# Patient Record
Sex: FEMALE | Race: WHITE | Employment: UNEMPLOYED | URBAN - METROPOLITAN AREA
[De-identification: names, ages, dates, MRNs, and addresses within clinical notes are randomized per-mention and may not be internally consistent; named-entity substitution may affect disease eponyms.]

---

## 2017-02-24 ENCOUNTER — OFFICE VISIT (OUTPATIENT)
Dept: ORTHOPEDIC SURGERY | Age: 57
End: 2017-02-24

## 2017-02-24 VITALS
HEIGHT: 62 IN | BODY MASS INDEX: 21.91 KG/M2 | WEIGHT: 119.05 LBS | SYSTOLIC BLOOD PRESSURE: 117 MMHG | HEART RATE: 65 BPM | DIASTOLIC BLOOD PRESSURE: 76 MMHG

## 2017-02-24 DIAGNOSIS — M79.644 PAIN OF FINGER OF RIGHT HAND: Primary | ICD-10-CM

## 2017-02-24 DIAGNOSIS — S63.619A FINGER SPRAIN, INITIAL ENCOUNTER: ICD-10-CM

## 2017-02-24 PROCEDURE — 73140 X-RAY EXAM OF FINGER(S): CPT | Performed by: ORTHOPAEDIC SURGERY

## 2017-02-24 PROCEDURE — 99214 OFFICE O/P EST MOD 30 MIN: CPT | Performed by: ORTHOPAEDIC SURGERY

## 2017-11-29 ENCOUNTER — OFFICE VISIT (OUTPATIENT)
Dept: ORTHOPEDIC SURGERY | Age: 57
End: 2017-11-29

## 2017-11-29 VITALS
BODY MASS INDEX: 21.91 KG/M2 | SYSTOLIC BLOOD PRESSURE: 129 MMHG | WEIGHT: 119.05 LBS | DIASTOLIC BLOOD PRESSURE: 87 MMHG | HEIGHT: 62 IN | HEART RATE: 95 BPM

## 2017-11-29 DIAGNOSIS — S92.355A CLOSED NONDISPLACED FRACTURE OF FIFTH METATARSAL BONE OF LEFT FOOT, INITIAL ENCOUNTER: ICD-10-CM

## 2017-11-29 DIAGNOSIS — M79.672 LEFT FOOT PAIN: Primary | ICD-10-CM

## 2017-11-29 PROCEDURE — 99214 OFFICE O/P EST MOD 30 MIN: CPT | Performed by: ORTHOPAEDIC SURGERY

## 2017-11-29 PROCEDURE — 73630 X-RAY EXAM OF FOOT: CPT | Performed by: ORTHOPAEDIC SURGERY

## 2017-11-29 PROCEDURE — L4361 PNEUMA/VAC WALK BOOT PRE OTS: HCPCS | Performed by: ORTHOPAEDIC SURGERY

## 2017-11-29 NOTE — PROGRESS NOTES
Chief Complaint    Injury (Left foot , fell and twisted it and rolled 11/28/2017)      History of Present Illness: Diogenes Hoskins is a 56 y.o. female whose  is Dr. Samuel Oppenheim here for evaluation chief complaint of left foot pain. States on 11/28/17 she was coming out of the basement and stepped onto a multi-out let extension cord. She rolled her foot into a inverted position and then fell to the ground. She had an isolated injury to the lateral aspect of the left foot. She's never injured this foot before. Pain is increased with weightbearing better when she gets off of it. She does not work out on a regular basis and currently rates her pain at 7-8 out of 10    Medical History:  Patient's medications, allergies, past medical, surgical, social and family histories were reviewed and updated as appropriate. Review of Systems:  Pertinent items are noted in HPI  Review of systems reviewed from Patient History Form dated on 11/29/17 and available in the patient's chart under the Media tab. Vital Signs:  /87 (Site: Left Arm, Position: Sitting)   Pulse 95   Ht 5' 1.81\" (1.57 m)   Wt 119 lb 0.8 oz (54 kg)   BMI 21.91 kg/m²     General Exam:   Constitutional: Patient is adequately groomed with no evidence of malnutrition  DTRs: Deep tendon reflexes are intact  Mental Status: The patient is oriented to time, place and person. The patient's mood and affect are appropriate. Foot Examination:    Inspection:  Mild swelling lateral aspect of left foot    Palpation:  Tenderness right over the 5th metatarsal base no tenderness over the ATFL    Range of Motion:  Mildly limited due to her complaint of pain lateral foot    Strength:  4/5 in a dorsiflexion eversion with only mild tenderness along the peroneal tendon    Special Tests:  Anterior drawer and talar tilt show no gross laxity    Skin: There are no rashes, ulcerations or lesions.     Gait: Antalgic with crutches    Reflex 2+ and

## 2017-12-20 ENCOUNTER — OFFICE VISIT (OUTPATIENT)
Dept: ORTHOPEDIC SURGERY | Age: 57
End: 2017-12-20

## 2017-12-20 VITALS
HEIGHT: 62 IN | HEART RATE: 88 BPM | WEIGHT: 119.05 LBS | DIASTOLIC BLOOD PRESSURE: 77 MMHG | BODY MASS INDEX: 21.91 KG/M2 | SYSTOLIC BLOOD PRESSURE: 119 MMHG

## 2017-12-20 DIAGNOSIS — M79.672 FOOT PAIN, LEFT: Primary | ICD-10-CM

## 2017-12-20 PROCEDURE — 73630 X-RAY EXAM OF FOOT: CPT | Performed by: ORTHOPAEDIC SURGERY

## 2017-12-20 PROCEDURE — 99212 OFFICE O/P EST SF 10 MIN: CPT | Performed by: ORTHOPAEDIC SURGERY

## 2017-12-20 NOTE — PROGRESS NOTES
Subjective: Patient states that she is here for follow-up of her left foot 5th metatarsal avulsion fracture. States that she has some achiness especially at the end of the day around a 4 out of 10. Also plantarflexion inversion tendons to create some increased pain. She has been very good about staying off of this and using her crutches. She did fall a few days ago which created a transient increase in her pain  Objective: Physical exam shows mild to moderate swelling directly over the base of the left 5th metatarsal.  She has 3+ to 4 minus over 5 strength into dorsiflexion eversion with tenderness right at the fracture site. She has no pain along the peroneal tendon. Anterior drawer and talar tilt showed no gross laxity. Sensations intact  Imaging: 3 views of the left foot show no change in the position of her fracture I don't see any callus formation yet  Assessment and plan: She will continue to take her multivitamin limit her activities only weight-bear on the heel she absolutely has to follow up with me in 3 weeks repeat x-rays

## 2018-01-10 ENCOUNTER — OFFICE VISIT (OUTPATIENT)
Dept: ORTHOPEDIC SURGERY | Age: 58
End: 2018-01-10

## 2018-01-10 VITALS
HEART RATE: 104 BPM | BODY MASS INDEX: 21.91 KG/M2 | SYSTOLIC BLOOD PRESSURE: 115 MMHG | WEIGHT: 119.05 LBS | HEIGHT: 62 IN | DIASTOLIC BLOOD PRESSURE: 75 MMHG

## 2018-01-10 DIAGNOSIS — S92.355D CLOSED NONDISPLACED FRACTURE OF FIFTH METATARSAL BONE OF LEFT FOOT WITH ROUTINE HEALING, SUBSEQUENT ENCOUNTER: ICD-10-CM

## 2018-01-10 DIAGNOSIS — M79.672 FOOT PAIN, LEFT: Primary | ICD-10-CM

## 2018-01-10 PROCEDURE — 99024 POSTOP FOLLOW-UP VISIT: CPT | Performed by: ORTHOPAEDIC SURGERY

## 2018-01-10 PROCEDURE — 73630 X-RAY EXAM OF FOOT: CPT | Performed by: ORTHOPAEDIC SURGERY

## 2018-02-14 ENCOUNTER — OFFICE VISIT (OUTPATIENT)
Dept: ORTHOPEDIC SURGERY | Age: 58
End: 2018-02-14

## 2018-02-14 VITALS
BODY MASS INDEX: 21.91 KG/M2 | HEIGHT: 62 IN | SYSTOLIC BLOOD PRESSURE: 121 MMHG | HEART RATE: 85 BPM | WEIGHT: 119.05 LBS | DIASTOLIC BLOOD PRESSURE: 77 MMHG

## 2018-02-14 DIAGNOSIS — M79.672 FOOT PAIN, LEFT: Primary | ICD-10-CM

## 2018-02-14 DIAGNOSIS — S92.355D CLOSED NONDISPLACED FRACTURE OF FIFTH METATARSAL BONE OF LEFT FOOT WITH ROUTINE HEALING, SUBSEQUENT ENCOUNTER: ICD-10-CM

## 2018-02-14 PROCEDURE — 99212 OFFICE O/P EST SF 10 MIN: CPT | Performed by: ORTHOPAEDIC SURGERY

## 2018-02-14 PROCEDURE — L1902 AFO ANKLE GAUNTLET PRE OTS: HCPCS | Performed by: ORTHOPAEDIC SURGERY

## 2018-02-21 ENCOUNTER — HOSPITAL ENCOUNTER (OUTPATIENT)
Dept: PHYSICAL THERAPY | Age: 58
Discharge: HOME | End: 2018-02-28
Admitting: ORTHOPAEDIC SURGERY

## 2018-02-21 NOTE — PLAN OF CARE
Disability Index:PT G-Codes  Functional Assessment Tool Used: LEFS   Score: 56%  Functional Limitation: Mobility: Walking and moving around  Mobility: Walking and Moving Around Current Status (): At least 40 percent but less than 60 percent impaired, limited or restricted  Mobility: Walking and Moving Around Goal Status (): At least 40 percent but less than 60 percent impaired, limited or restricted  Mobility: Walking and Moving Around Discharge Status ():  At least 40 percent but less than 60 percent impaired, limited or restricted    Pain Scale: 4-5/10  Easing factors: rest, ice   Provocative factors: rolling over in bed, turning foot out, pushing off foot going up the stairs      Type: [x]Constant   []Intermittent  []Radiating []Localized []other:     Numbness/Tingling: pt denies     Occupation/School: does not work     Living Status/Prior Level of Function: Independent with ADLs and IADLs, lives with , bedroom and bathroom upstairs; some other bedrooms upstairs ; pt was walking and hiking prior to injury     OBJECTIVE:     ROM LEFT RIGHT   HIP Flex     HIP Abd     HIP Ext     HIP IR     HIP ER     Knee ext     Knee Flex     Ankle PF 40° 60°   Ankle DF 5° 10°   Ankle In 22° pain 5th met° 35°   Ankle Ev 18° slight pain  25°   Strength  LEFT RIGHT   HIP Flexors     HIP Abductors     HIP Ext     Hip ER     Knee EXT (quad)     Knee Flex (HS)     Ankle DF 4+ 5   Ankle PF 4 5   Ankle Inv 4 5   Ankle EV 4+ 5        Circumference  Mid apex  7 cm prox             Reflexes/Sensation: NT   []Dermatomes/Myotomes intact    []Reflexes equal and normal bilaterally   []Other:    Joint mobility:    []Normal    [x]Hypo   []Hyper    Palpation: tender with palp L peroneal tendon; 5ht metatarsal     Functional Mobility/Transfers: independent     Posture: decreased weight acceptance through LLE     Bandages/Dressings/Incisions: NA     Gait: WBAT; no AD ; decreased DF at heelstrike, no PF at pushoff, decreased step length, decreased stance time on the L     Orthopedic Special Tests: negative ant drawer                        [x] Patient history, allergies, meds reviewed. Medical chart reviewed. See intake form. Review Of Systems (ROS):  [x]Performed Review of systems (Integumentary, CardioPulmonary, Neurological) by intake and observation. Intake form has been scanned into medical record. Patient has been instructed to contact their primary care physician regarding ROS issues if not already being addressed at this time. Co-morbidities/Complexities (which will affect course of rehabilitation):   [x]None           Arthritic conditions   []Rheumatoid arthritis (M05.9)  []Osteoarthritis (M19.91)   Cardiovascular conditions   []Hypertension (I10)  []Hyperlipidemia (E78.5)  []Angina pectoris (I20)  []Atherosclerosis (I70)   Musculoskeletal conditions   []Disc pathology   []Congenital spine pathologies   []Prior surgical intervention  []Osteoporosis (M81.8)  []Osteopenia (M85.8)   Endocrine conditions   []Hypothyroid (E03.9)  []Hyperthyroid Gastrointestinal conditions   []Constipation (M60.03)   Metabolic conditions   []Morbid obesity (E66.01)  []Diabetes type 1(E10.65) or 2 (E11.65)   []Neuropathy (G60.9)     Pulmonary conditions   []Asthma (J45)  []Coughing   []COPD (J44.9)   Psychological Disorders  []Anxiety (F41.9)  []Depression (F32.9)   []Other:   []Other:          Barriers to/and or personal factors that will affect rehab potential:              []Age  []Sex              []Motivation/Lack of Motivation                        []Co-Morbidities              []Cognitive Function, education/learning barriers              []Environmental, home barriers              []profession/work barriers  []past PT/medical experience  []other:  Justification:     Falls Risk Assessment (30 days):   [x] Falls Risk assessed and no intervention required.   [] Falls Risk assessed and Patient requires intervention due to being higher risk 30 minutes face-to-face)  [] EVAL (HIGH) 44054 (typically 45 minutes face-to-face)  [] RE-EVAL       PLAN:   Frequency/Duration:  1 days per week for 1 Weeks:  Interventions:  [x]  Therapeutic exercise including: strength training, ROM, for Lower extremity and core   [x]  NMR activation and proprioception for LE, Glutes and Core   [x]  Manual therapy as indicated for LE, Hip and spine to include: Dry Needling/IASTM, STM, PROM, Gr I-IV mobilizations, manipulation. [] Modalities as needed that may include: thermal agents, E-stim, Biofeedback, US, iontophoresis as indicated  [x] Patient education on joint protection, postural re-education, activity modification, progression of HEP.     HEP instruction: pt given written HEP and progression   GOALS:  Patient stated goal: increase ankle/foot mobility, decreased pain     Therapist goals for Patient: NA ; PT order for 1x visit for HEP        Electronically signed by:  John Hernandez, PT

## 2018-03-01 ENCOUNTER — HOSPITAL ENCOUNTER (OUTPATIENT)
Dept: PHYSICAL THERAPY | Age: 58
Discharge: HOME | End: 2018-03-31
Attending: ORTHOPAEDIC SURGERY | Admitting: ORTHOPAEDIC SURGERY

## 2018-03-28 ENCOUNTER — OFFICE VISIT (OUTPATIENT)
Dept: ORTHOPEDIC SURGERY | Age: 58
End: 2018-03-28

## 2018-03-28 VITALS
HEART RATE: 98 BPM | DIASTOLIC BLOOD PRESSURE: 71 MMHG | HEIGHT: 62 IN | WEIGHT: 119.05 LBS | BODY MASS INDEX: 21.91 KG/M2 | SYSTOLIC BLOOD PRESSURE: 118 MMHG

## 2018-03-28 DIAGNOSIS — S92.355D OPEN NONDISPLACED FRACTURE OF FIFTH METATARSAL BONE OF LEFT FOOT WITH ROUTINE HEALING, SUBSEQUENT ENCOUNTER: ICD-10-CM

## 2018-03-28 DIAGNOSIS — M79.672 FOOT PAIN, LEFT: Primary | ICD-10-CM

## 2018-03-28 PROCEDURE — 99212 OFFICE O/P EST SF 10 MIN: CPT | Performed by: ORTHOPAEDIC SURGERY

## 2018-03-28 RX ORDER — DEXAMETHASONE SODIUM PHOSPHATE 4 MG/ML
4 INJECTION, SOLUTION INTRA-ARTICULAR; INTRALESIONAL; INTRAMUSCULAR; INTRAVENOUS; SOFT TISSUE SEE ADMIN INSTRUCTIONS
Qty: 30 ML | Refills: 0 | Status: SHIPPED | OUTPATIENT
Start: 2018-03-28

## 2018-03-28 RX ORDER — MELOXICAM 15 MG/1
15 TABLET ORAL DAILY
Qty: 30 TABLET | Refills: 3 | Status: SHIPPED | OUTPATIENT
Start: 2018-03-28

## 2018-03-28 RX ORDER — CYCLOSPORINE 0.5 MG/ML
EMULSION OPHTHALMIC
Refills: 2 | COMMUNITY
Start: 2018-01-15

## 2018-04-03 ENCOUNTER — HOSPITAL ENCOUNTER (OUTPATIENT)
Dept: PHYSICAL THERAPY | Age: 58
Discharge: HOME | End: 2018-04-30
Admitting: ORTHOPAEDIC SURGERY

## 2018-04-06 ENCOUNTER — HOSPITAL ENCOUNTER (OUTPATIENT)
Dept: PHYSICAL THERAPY | Age: 58
Discharge: HOME | End: 2018-04-07
Admitting: ORTHOPAEDIC SURGERY

## 2018-04-11 ENCOUNTER — HOSPITAL ENCOUNTER (OUTPATIENT)
Dept: PHYSICAL THERAPY | Age: 58
Discharge: HOME | End: 2018-04-12
Admitting: ORTHOPAEDIC SURGERY

## 2018-04-13 ENCOUNTER — HOSPITAL ENCOUNTER (OUTPATIENT)
Dept: PHYSICAL THERAPY | Age: 58
Discharge: HOME | End: 2018-04-14
Admitting: ORTHOPAEDIC SURGERY

## 2018-04-17 ENCOUNTER — HOSPITAL ENCOUNTER (OUTPATIENT)
Dept: PHYSICAL THERAPY | Age: 58
Discharge: HOME | End: 2018-04-18
Admitting: ORTHOPAEDIC SURGERY

## 2018-04-19 ENCOUNTER — HOSPITAL ENCOUNTER (OUTPATIENT)
Dept: PHYSICAL THERAPY | Age: 58
Discharge: HOME | End: 2018-04-20
Admitting: ORTHOPAEDIC SURGERY

## 2018-04-25 ENCOUNTER — HOSPITAL ENCOUNTER (OUTPATIENT)
Dept: PHYSICAL THERAPY | Age: 58
Discharge: HOME | End: 2018-04-26
Admitting: ORTHOPAEDIC SURGERY

## 2018-05-01 ENCOUNTER — HOSPITAL ENCOUNTER (OUTPATIENT)
Dept: PHYSICAL THERAPY | Age: 58
Discharge: HOME | End: 2018-05-31
Attending: ORTHOPAEDIC SURGERY | Admitting: ORTHOPAEDIC SURGERY

## 2018-05-03 ENCOUNTER — HOSPITAL ENCOUNTER (OUTPATIENT)
Dept: PHYSICAL THERAPY | Age: 58
Discharge: HOME | End: 2018-05-04
Admitting: ORTHOPAEDIC SURGERY

## 2018-05-04 DIAGNOSIS — S92.355D CLOSED NONDISPLACED FRACTURE OF FIFTH METATARSAL BONE OF LEFT FOOT WITH ROUTINE HEALING, SUBSEQUENT ENCOUNTER: Primary | ICD-10-CM

## 2018-05-10 ENCOUNTER — HOSPITAL ENCOUNTER (OUTPATIENT)
Dept: PHYSICAL THERAPY | Age: 58
Discharge: HOME | End: 2018-05-11
Admitting: ORTHOPAEDIC SURGERY

## 2018-06-01 ENCOUNTER — HOSPITAL ENCOUNTER (OUTPATIENT)
Dept: PHYSICAL THERAPY | Age: 58
Discharge: HOME | End: 2018-06-30
Attending: ORTHOPAEDIC SURGERY | Admitting: ORTHOPAEDIC SURGERY

## 2019-04-18 ENCOUNTER — OFFICE VISIT (OUTPATIENT)
Dept: ORTHOPEDIC SURGERY | Age: 59
End: 2019-04-18
Payer: COMMERCIAL

## 2019-04-18 ENCOUNTER — PRE-EVALUATION (OUTPATIENT)
Dept: ORTHOPEDIC SURGERY | Age: 59
End: 2019-04-18

## 2019-04-18 VITALS
DIASTOLIC BLOOD PRESSURE: 82 MMHG | HEART RATE: 88 BPM | BODY MASS INDEX: 21.91 KG/M2 | SYSTOLIC BLOOD PRESSURE: 123 MMHG | WEIGHT: 119.05 LBS | HEIGHT: 62 IN

## 2019-04-18 DIAGNOSIS — M47.816 LUMBAR SPONDYLOSIS: Primary | ICD-10-CM

## 2019-04-18 DIAGNOSIS — M54.50 LUMBAR PAIN: ICD-10-CM

## 2019-04-18 PROCEDURE — G8427 DOCREV CUR MEDS BY ELIG CLIN: HCPCS | Performed by: ORTHOPAEDIC SURGERY

## 2019-04-18 PROCEDURE — MISCD282 ADJUSTA LIFT: Performed by: ORTHOPAEDIC SURGERY

## 2019-04-18 PROCEDURE — 3017F COLORECTAL CA SCREEN DOC REV: CPT | Performed by: ORTHOPAEDIC SURGERY

## 2019-04-18 PROCEDURE — 99213 OFFICE O/P EST LOW 20 MIN: CPT | Performed by: ORTHOPAEDIC SURGERY

## 2019-04-18 PROCEDURE — 1036F TOBACCO NON-USER: CPT | Performed by: ORTHOPAEDIC SURGERY

## 2019-04-18 PROCEDURE — G8420 CALC BMI NORM PARAMETERS: HCPCS | Performed by: ORTHOPAEDIC SURGERY

## 2019-04-18 PROCEDURE — APPNB30 APP NON BILLABLE TIME 0-30 MINS: Performed by: PHYSICIAN ASSISTANT

## 2019-04-18 NOTE — PROGRESS NOTES
flexion, extension and lateral bending are moderately reduced with pain. She has mild tenderness over her lumbar spine without obvious muscle spasm. The skin over her lumbar spine is normal without a surgical scar. Lower extremities:  She has 5/5 motor strength of bilateral lower extremities. She has a negative straight leg raise, bilaterally. Deep tendon reflexes at knees and achilles are 2+. Sensation is intact to light touch L3 to S1 bilaterally. She has no clonus. Hip range of motion painless. Imaging:  I independently reviewed AP and lateral xray images of her lumbar spine from the office today. They note mild scoliosis and spondylosis. No acute fracture or dislocation noted. Assessment:  Lumbar spondylosis with radiculopathy    Plan:  We discussed treatment options including observation, oral steroids, physical therapy, epidural injections and additional imaging. I recommend she have an MRI of her lumbar spine. We will call her with her results.

## 2019-04-19 ENCOUNTER — TELEPHONE (OUTPATIENT)
Dept: ORTHOPEDIC SURGERY | Age: 59
End: 2019-04-19

## 2019-05-06 ENCOUNTER — TELEPHONE (OUTPATIENT)
Dept: ORTHOPEDIC SURGERY | Age: 59
End: 2019-05-06

## 2019-05-09 ENCOUNTER — TELEPHONE (OUTPATIENT)
Dept: ORTHOPEDIC SURGERY | Age: 59
End: 2019-05-09

## 2023-10-19 ENCOUNTER — HOSPITAL ENCOUNTER (OUTPATIENT)
Dept: MRI IMAGING | Age: 63
Discharge: HOME | End: 2023-10-19
Attending: SURGERY
Payer: COMMERCIAL

## 2023-10-19 ENCOUNTER — TELEPHONE (OUTPATIENT)
Dept: SURGERY | Age: 63
End: 2023-10-19

## 2023-10-19 ENCOUNTER — HOSPITAL ENCOUNTER (OUTPATIENT)
Age: 63
Discharge: HOME | End: 2023-10-19
Attending: SURGERY
Payer: COMMERCIAL

## 2023-10-19 DIAGNOSIS — R10.84 GENERALIZED ABDOMINAL PAIN: ICD-10-CM

## 2023-10-19 DIAGNOSIS — R17 JAUNDICE: ICD-10-CM

## 2023-10-19 DIAGNOSIS — R10.84 GENERALIZED ABDOMINAL PAIN: Primary | ICD-10-CM

## 2023-10-19 LAB
ALBUMIN SERPL-MCNC: 4.5 G/DL (ref 3.4–5)
ALP SERPL-CCNC: 500 U/L (ref 40–129)
ALT SERPL-CCNC: 583 U/L (ref 10–40)
ANION GAP SERPL CALCULATED.3IONS-SCNC: 11 MMOL/L (ref 3–16)
AST SERPL-CCNC: 335 U/L (ref 15–37)
BASOPHILS # BLD: 0.1 K/UL (ref 0–0.2)
BASOPHILS NFR BLD: 2.3 %
BILIRUB DIRECT SERPL-MCNC: 4.1 MG/DL (ref 0–0.3)
BILIRUB INDIRECT SERPL-MCNC: 1.3 MG/DL (ref 0–1)
BILIRUB SERPL-MCNC: 5.4 MG/DL (ref 0–1)
BUN SERPL-MCNC: 9 MG/DL (ref 7–20)
CALCIUM SERPL-MCNC: 9.7 MG/DL (ref 8.3–10.6)
CHLORIDE SERPL-SCNC: 104 MMOL/L (ref 99–110)
CO2 SERPL-SCNC: 24 MMOL/L (ref 21–32)
CREAT SERPL-MCNC: <0.5 MG/DL (ref 0.6–1.2)
DEPRECATED RDW RBC AUTO: 13.7 % (ref 12.4–15.4)
EOSINOPHIL # BLD: 0.2 K/UL (ref 0–0.6)
EOSINOPHIL NFR BLD: 3.8 %
GFR SERPLBLD CREATININE-BSD FMLA CKD-EPI: >60 ML/MIN/{1.73_M2}
GLUCOSE SERPL-MCNC: 104 MG/DL (ref 70–99)
HCT VFR BLD AUTO: 39.7 % (ref 36–48)
HGB BLD-MCNC: 13.3 G/DL (ref 12–16)
LYMPHOCYTES # BLD: 1.5 K/UL (ref 1–5.1)
LYMPHOCYTES NFR BLD: 36.8 %
MCH RBC QN AUTO: 29.4 PG (ref 26–34)
MCHC RBC AUTO-ENTMCNC: 33.6 G/DL (ref 31–36)
MCV RBC AUTO: 87.5 FL (ref 80–100)
MONOCYTES # BLD: 0.5 K/UL (ref 0–1.3)
MONOCYTES NFR BLD: 12.1 %
NEUTROPHILS # BLD: 1.9 K/UL (ref 1.7–7.7)
NEUTROPHILS NFR BLD: 45 %
PLATELET # BLD AUTO: 225 K/UL (ref 135–450)
PMV BLD AUTO: 8.1 FL (ref 5–10.5)
POTASSIUM SERPL-SCNC: 4 MMOL/L (ref 3.5–5.1)
PROT SERPL-MCNC: 7.5 G/DL (ref 6.4–8.2)
RBC # BLD AUTO: 4.53 M/UL (ref 4–5.2)
SODIUM SERPL-SCNC: 139 MMOL/L (ref 136–145)
WBC # BLD AUTO: 4.2 K/UL (ref 4–11)

## 2023-10-19 PROCEDURE — 74183 MRI ABD W/O CNTR FLWD CNTR: CPT

## 2023-10-19 PROCEDURE — 80076 HEPATIC FUNCTION PANEL: CPT

## 2023-10-19 PROCEDURE — 6360000004 HC RX CONTRAST MEDICATION: Performed by: SURGERY

## 2023-10-19 PROCEDURE — A9579 GAD-BASE MR CONTRAST NOS,1ML: HCPCS | Performed by: SURGERY

## 2023-10-19 PROCEDURE — 36415 COLL VENOUS BLD VENIPUNCTURE: CPT

## 2023-10-19 PROCEDURE — 85025 COMPLETE CBC W/AUTO DIFF WBC: CPT

## 2023-10-19 PROCEDURE — 80048 BASIC METABOLIC PNL TOTAL CA: CPT

## 2023-10-19 RX ADMIN — GADOTERIDOL 10 ML: 279.3 INJECTION, SOLUTION INTRAVENOUS at 13:50

## 2023-10-19 NOTE — TELEPHONE ENCOUNTER
Patient called with nausea, vomiting, and abdominal pain for about a week. She has begun to have shaji colored stools and her skin is jaundiced. Given these findings, we will check some labs and an MRCP.

## 2023-10-20 ENCOUNTER — ANESTHESIA (OUTPATIENT)
Dept: ENDOSCOPY | Age: 63
End: 2023-10-20
Payer: COMMERCIAL

## 2023-10-20 ENCOUNTER — HOSPITAL ENCOUNTER (OUTPATIENT)
Age: 63
Setting detail: OUTPATIENT SURGERY
Discharge: HOME | End: 2023-10-20
Attending: INTERNAL MEDICINE | Admitting: INTERNAL MEDICINE
Payer: COMMERCIAL

## 2023-10-20 ENCOUNTER — ANESTHESIA EVENT (OUTPATIENT)
Dept: ENDOSCOPY | Age: 63
End: 2023-10-20
Payer: COMMERCIAL

## 2023-10-20 ENCOUNTER — APPOINTMENT (OUTPATIENT)
Dept: GENERAL RADIOLOGY | Age: 63
End: 2023-10-20
Attending: INTERNAL MEDICINE
Payer: COMMERCIAL

## 2023-10-20 VITALS
OXYGEN SATURATION: 100 % | HEIGHT: 62 IN | HEART RATE: 69 BPM | TEMPERATURE: 97.3 F | BODY MASS INDEX: 21.71 KG/M2 | RESPIRATION RATE: 11 BRPM | DIASTOLIC BLOOD PRESSURE: 76 MMHG | WEIGHT: 118 LBS | SYSTOLIC BLOOD PRESSURE: 145 MMHG

## 2023-10-20 DIAGNOSIS — K80.50 STONES COMMON DUCT: ICD-10-CM

## 2023-10-20 DIAGNOSIS — R17 JAUNDICE: ICD-10-CM

## 2023-10-20 PROCEDURE — 7100000011 HC PHASE II RECOVERY - ADDTL 15 MIN: Performed by: INTERNAL MEDICINE

## 2023-10-20 PROCEDURE — 88342 IMHCHEM/IMCYTCHM 1ST ANTB: CPT

## 2023-10-20 PROCEDURE — 2580000003 HC RX 258: Performed by: NURSE ANESTHETIST, CERTIFIED REGISTERED

## 2023-10-20 PROCEDURE — 3700000000 HC ANESTHESIA ATTENDED CARE: Performed by: INTERNAL MEDICINE

## 2023-10-20 PROCEDURE — 88305 TISSUE EXAM BY PATHOLOGIST: CPT

## 2023-10-20 PROCEDURE — 3609015100 HC ERCP STENT PLACEMENT BILIARY/PANCREATIC DUCT: Performed by: INTERNAL MEDICINE

## 2023-10-20 PROCEDURE — 88172 CYTP DX EVAL FNA 1ST EA SITE: CPT

## 2023-10-20 PROCEDURE — 3609012400 HC EGD TRANSORAL BIOPSY SINGLE/MULTIPLE: Performed by: INTERNAL MEDICINE

## 2023-10-20 PROCEDURE — 6360000002 HC RX W HCPCS: Performed by: NURSE ANESTHETIST, CERTIFIED REGISTERED

## 2023-10-20 PROCEDURE — 3700000001 HC ADD 15 MINUTES (ANESTHESIA): Performed by: INTERNAL MEDICINE

## 2023-10-20 PROCEDURE — 2500000003 HC RX 250 WO HCPCS: Performed by: ANESTHESIOLOGY

## 2023-10-20 PROCEDURE — 74330 X-RAY BILE/PANC ENDOSCOPY: CPT

## 2023-10-20 PROCEDURE — C2617 STENT, NON-COR, TEM W/O DEL: HCPCS | Performed by: INTERNAL MEDICINE

## 2023-10-20 PROCEDURE — 2720000010 HC SURG SUPPLY STERILE: Performed by: INTERNAL MEDICINE

## 2023-10-20 PROCEDURE — 7100000001 HC PACU RECOVERY - ADDTL 15 MIN: Performed by: INTERNAL MEDICINE

## 2023-10-20 PROCEDURE — 7100000010 HC PHASE II RECOVERY - FIRST 15 MIN: Performed by: INTERNAL MEDICINE

## 2023-10-20 PROCEDURE — 88173 CYTOPATH EVAL FNA REPORT: CPT

## 2023-10-20 PROCEDURE — 2709999900 HC NON-CHARGEABLE SUPPLY: Performed by: INTERNAL MEDICINE

## 2023-10-20 PROCEDURE — 6370000000 HC RX 637 (ALT 250 FOR IP): Performed by: INTERNAL MEDICINE

## 2023-10-20 PROCEDURE — 6360000002 HC RX W HCPCS: Performed by: ANESTHESIOLOGY

## 2023-10-20 PROCEDURE — 2500000003 HC RX 250 WO HCPCS: Performed by: NURSE ANESTHETIST, CERTIFIED REGISTERED

## 2023-10-20 PROCEDURE — 88341 IMHCHEM/IMCYTCHM EA ADD ANTB: CPT

## 2023-10-20 PROCEDURE — 7100000000 HC PACU RECOVERY - FIRST 15 MIN: Performed by: INTERNAL MEDICINE

## 2023-10-20 PROCEDURE — 3609020800 HC EGD W/EUS FNA: Performed by: INTERNAL MEDICINE

## 2023-10-20 PROCEDURE — 88177 CYTP FNA EVAL EA ADDL: CPT

## 2023-10-20 DEVICE — PANCREATIC STENT
Type: IMPLANTABLE DEVICE | Status: FUNCTIONAL
Brand: ADVANIX™ PANCREATIC STENT

## 2023-10-20 RX ORDER — MIDAZOLAM HYDROCHLORIDE 1 MG/ML
2 INJECTION INTRAMUSCULAR; INTRAVENOUS ONCE
Status: COMPLETED | OUTPATIENT
Start: 2023-10-20 | End: 2023-10-20

## 2023-10-20 RX ORDER — OXYCODONE HYDROCHLORIDE 5 MG/1
10 TABLET ORAL PRN
Status: DISCONTINUED | OUTPATIENT
Start: 2023-10-20 | End: 2023-10-20 | Stop reason: HOSPADM

## 2023-10-20 RX ORDER — OXYCODONE HYDROCHLORIDE 5 MG/1
5 TABLET ORAL PRN
Status: DISCONTINUED | OUTPATIENT
Start: 2023-10-20 | End: 2023-10-20 | Stop reason: HOSPADM

## 2023-10-20 RX ORDER — ESOMEPRAZOLE MAGNESIUM 20 MG/1
20 FOR SUSPENSION ORAL DAILY
COMMUNITY

## 2023-10-20 RX ORDER — FENTANYL CITRATE 50 UG/ML
INJECTION, SOLUTION INTRAMUSCULAR; INTRAVENOUS PRN
Status: DISCONTINUED | OUTPATIENT
Start: 2023-10-20 | End: 2023-10-20 | Stop reason: SDUPTHER

## 2023-10-20 RX ORDER — MEPERIDINE HYDROCHLORIDE 25 MG/ML
12.5 INJECTION INTRAMUSCULAR; INTRAVENOUS; SUBCUTANEOUS EVERY 5 MIN PRN
Status: DISCONTINUED | OUTPATIENT
Start: 2023-10-20 | End: 2023-10-20 | Stop reason: HOSPADM

## 2023-10-20 RX ORDER — SODIUM CHLORIDE 0.9 % (FLUSH) 0.9 %
5-40 SYRINGE (ML) INJECTION EVERY 12 HOURS SCHEDULED
Status: DISCONTINUED | OUTPATIENT
Start: 2023-10-20 | End: 2023-10-20 | Stop reason: HOSPADM

## 2023-10-20 RX ORDER — LIDOCAINE HYDROCHLORIDE 10 MG/ML
0.3 INJECTION, SOLUTION EPIDURAL; INFILTRATION; INTRACAUDAL; PERINEURAL
Status: DISCONTINUED | OUTPATIENT
Start: 2023-10-20 | End: 2023-10-20 | Stop reason: HOSPADM

## 2023-10-20 RX ORDER — ONDANSETRON 2 MG/ML
4 INJECTION INTRAMUSCULAR; INTRAVENOUS
Status: DISCONTINUED | OUTPATIENT
Start: 2023-10-20 | End: 2023-10-20 | Stop reason: HOSPADM

## 2023-10-20 RX ORDER — SODIUM CHLORIDE, SODIUM LACTATE, POTASSIUM CHLORIDE, CALCIUM CHLORIDE 600; 310; 30; 20 MG/100ML; MG/100ML; MG/100ML; MG/100ML
INJECTION, SOLUTION INTRAVENOUS CONTINUOUS
Status: DISCONTINUED | OUTPATIENT
Start: 2023-10-20 | End: 2023-10-20 | Stop reason: HOSPADM

## 2023-10-20 RX ORDER — KETOROLAC TROMETHAMINE 30 MG/ML
INJECTION, SOLUTION INTRAMUSCULAR; INTRAVENOUS PRN
Status: DISCONTINUED | OUTPATIENT
Start: 2023-10-20 | End: 2023-10-20 | Stop reason: SDUPTHER

## 2023-10-20 RX ORDER — DEXAMETHASONE SODIUM PHOSPHATE 4 MG/ML
INJECTION, SOLUTION INTRA-ARTICULAR; INTRALESIONAL; INTRAMUSCULAR; INTRAVENOUS; SOFT TISSUE PRN
Status: DISCONTINUED | OUTPATIENT
Start: 2023-10-20 | End: 2023-10-20 | Stop reason: SDUPTHER

## 2023-10-20 RX ORDER — ROCURONIUM BROMIDE 10 MG/ML
INJECTION, SOLUTION INTRAVENOUS PRN
Status: DISCONTINUED | OUTPATIENT
Start: 2023-10-20 | End: 2023-10-20 | Stop reason: SDUPTHER

## 2023-10-20 RX ORDER — LIDOCAINE HYDROCHLORIDE 20 MG/ML
INJECTION, SOLUTION EPIDURAL; INFILTRATION; INTRACAUDAL; PERINEURAL PRN
Status: DISCONTINUED | OUTPATIENT
Start: 2023-10-20 | End: 2023-10-20 | Stop reason: SDUPTHER

## 2023-10-20 RX ORDER — LABETALOL HYDROCHLORIDE 5 MG/ML
5 INJECTION, SOLUTION INTRAVENOUS EVERY 10 MIN PRN
Status: DISCONTINUED | OUTPATIENT
Start: 2023-10-20 | End: 2023-10-20 | Stop reason: HOSPADM

## 2023-10-20 RX ORDER — PROPOFOL 10 MG/ML
INJECTION, EMULSION INTRAVENOUS PRN
Status: DISCONTINUED | OUTPATIENT
Start: 2023-10-20 | End: 2023-10-20 | Stop reason: SDUPTHER

## 2023-10-20 RX ORDER — INDOMETHACIN 50 MG/1
100 SUPPOSITORY RECTAL ONCE
Status: COMPLETED | OUTPATIENT
Start: 2023-10-20 | End: 2023-10-20

## 2023-10-20 RX ORDER — SODIUM CHLORIDE 0.9 % (FLUSH) 0.9 %
5-40 SYRINGE (ML) INJECTION PRN
Status: DISCONTINUED | OUTPATIENT
Start: 2023-10-20 | End: 2023-10-20 | Stop reason: HOSPADM

## 2023-10-20 RX ORDER — SODIUM CHLORIDE, SODIUM LACTATE, POTASSIUM CHLORIDE, CALCIUM CHLORIDE 600; 310; 30; 20 MG/100ML; MG/100ML; MG/100ML; MG/100ML
INJECTION, SOLUTION INTRAVENOUS CONTINUOUS PRN
Status: DISCONTINUED | OUTPATIENT
Start: 2023-10-20 | End: 2023-10-20 | Stop reason: SDUPTHER

## 2023-10-20 RX ORDER — SODIUM CHLORIDE 9 MG/ML
INJECTION, SOLUTION INTRAVENOUS PRN
Status: DISCONTINUED | OUTPATIENT
Start: 2023-10-20 | End: 2023-10-20 | Stop reason: HOSPADM

## 2023-10-20 RX ORDER — ONDANSETRON 2 MG/ML
INJECTION INTRAMUSCULAR; INTRAVENOUS PRN
Status: DISCONTINUED | OUTPATIENT
Start: 2023-10-20 | End: 2023-10-20 | Stop reason: SDUPTHER

## 2023-10-20 RX ORDER — DIPHENHYDRAMINE HYDROCHLORIDE 50 MG/ML
12.5 INJECTION INTRAMUSCULAR; INTRAVENOUS
Status: DISCONTINUED | OUTPATIENT
Start: 2023-10-20 | End: 2023-10-20 | Stop reason: HOSPADM

## 2023-10-20 RX ADMIN — DEXAMETHASONE SODIUM PHOSPHATE 8 MG: 4 INJECTION, SOLUTION INTRAMUSCULAR; INTRAVENOUS at 15:21

## 2023-10-20 RX ADMIN — LIDOCAINE HYDROCHLORIDE 60 MG: 20 INJECTION, SOLUTION EPIDURAL; INFILTRATION; INTRACAUDAL; PERINEURAL at 15:10

## 2023-10-20 RX ADMIN — PHENYLEPHRINE HYDROCHLORIDE 100 MCG: 10 INJECTION INTRAVENOUS at 16:01

## 2023-10-20 RX ADMIN — MIDAZOLAM 2 MG: 1 INJECTION INTRAMUSCULAR; INTRAVENOUS at 15:01

## 2023-10-20 RX ADMIN — ROCURONIUM BROMIDE 50 MG: 10 INJECTION, SOLUTION INTRAVENOUS at 15:10

## 2023-10-20 RX ADMIN — KETOROLAC TROMETHAMINE 30 MG: 30 INJECTION, SOLUTION INTRAMUSCULAR at 17:07

## 2023-10-20 RX ADMIN — PHENYLEPHRINE HYDROCHLORIDE 100 MCG: 10 INJECTION INTRAVENOUS at 16:23

## 2023-10-20 RX ADMIN — SUGAMMADEX 200 MG: 100 INJECTION, SOLUTION INTRAVENOUS at 17:02

## 2023-10-20 RX ADMIN — INDOMETHACIN 100 MG: 50 SUPPOSITORY RECTAL at 17:30

## 2023-10-20 RX ADMIN — ONDANSETRON HYDROCHLORIDE 4 MG: 2 INJECTION, SOLUTION INTRAMUSCULAR; INTRAVENOUS at 15:21

## 2023-10-20 RX ADMIN — SODIUM CHLORIDE, POTASSIUM CHLORIDE, SODIUM LACTATE AND CALCIUM CHLORIDE: 600; 310; 30; 20 INJECTION, SOLUTION INTRAVENOUS at 16:23

## 2023-10-20 RX ADMIN — PHENYLEPHRINE HYDROCHLORIDE 100 MCG: 10 INJECTION INTRAVENOUS at 16:12

## 2023-10-20 RX ADMIN — FENTANYL CITRATE 50 MCG: 50 INJECTION INTRAMUSCULAR; INTRAVENOUS at 15:27

## 2023-10-20 RX ADMIN — PROPOFOL 150 MG: 10 INJECTION, EMULSION INTRAVENOUS at 15:10

## 2023-10-20 RX ADMIN — SODIUM CHLORIDE, POTASSIUM CHLORIDE, SODIUM LACTATE AND CALCIUM CHLORIDE: 600; 310; 30; 20 INJECTION, SOLUTION INTRAVENOUS at 15:02

## 2023-10-20 RX ADMIN — FENTANYL CITRATE 50 MCG: 50 INJECTION INTRAMUSCULAR; INTRAVENOUS at 15:21

## 2023-10-20 ASSESSMENT — PAIN - FUNCTIONAL ASSESSMENT: PAIN_FUNCTIONAL_ASSESSMENT: 0-10

## 2023-10-20 NOTE — ANESTHESIA POSTPROCEDURE EVALUATION
Department of Anesthesiology  Postprocedure Note    Patient: Sarah Gauthier  MRN: [id number]  YOB: 1960  Date of evaluation: 10/20/2023      Procedure Summary     Date: 10/20/23 Room / Location: [address] [place] [address] [place] [facility]    Anesthesia Start: 2437 Anesthesia Stop: 8106    Procedures:       UPPER EUS W/ANES. (15:00)      ERCP STENT INSERTION      EGD W/EUS FNA      EGD BIOPSY Diagnosis:       Jaundice      Stones common duct      (Jaundice [R17])      (Stones common duct [K80.50])    Surgeons: Deedee Silva MD Responsible Provider: Oanh Ramos MD    Anesthesia Type: general ASA Status: 2          Anesthesia Type: No value filed. Vernon Phase I: Vernon Score: 10    Vernon Phase II: Vernon Score: 10      Anesthesia Post Evaluation    Patient location during evaluation: PACU  Patient participation: complete - patient participated  Level of consciousness: awake and alert  Airway patency: patent  Nausea & Vomiting: no nausea and no vomiting  Complications: no  Cardiovascular status: blood pressure returned to baseline  Respiratory status: acceptable  Hydration status: euvolemic  Comments: VSS on transfer to phase 2 recovery. No anesthetic complications.   Pain management: adequate

## 2023-10-20 NOTE — PROGRESS NOTES
1715- PACU at this time. Assessment complete. VSS. Drowsy. Room air. 1747- PACU complete & pt now phase 2. Vernon 10. Assessment complete. VSS. A/O. Denies pain & nausea. C/o being cold, warm blanket provided. 1815- Phase 2 complete. Assisted pt with getting dressed. IV d/c'd. Dsg applied. Site WNL. D/c instructions given to spouse. No questions voiced. 1820- Assisted pt to BR.     1825- Assisted pt to car via w/c. Pt has all belongings. Pt left in stable condition w/ spouse.

## 2023-10-21 NOTE — OP NOTE
[address] [facility] [address]                 [address] [address]                                OPERATIVE REPORT    PATIENT NAME: Jenna Lentz                     :        1960  MED REC NO:   [id number]                          ROOM:  ACCOUNT NO:   [de-identified]                           ADMIT DATE: 10/20/2023  PROVIDER:     Vickie Ureña MD    DATE OF PROCEDURE:  10/20/2023    PREPROCEDURE DIAGNOSES:  1. Painless jaundice. 2.  Dilated bile duct per MRCP. PROCEDURE PERFORMED:  1.  EUS with FNA. 2.  EGD with biopsy. 3.  ERCP with PD stent placement. POSTPROCEDURE DIAGNOSES:  1. 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 2.  Celiac axis lymph node. 3.  Biliary duct dilation with maximum diameter of 11.7 mm.  4.  Normal pancreas. PROCEDURE INDICATIONS:  This is a 21-year-old female with no significant  past medical history, presents with painless jaundice and recent MRCP  showing dilated bile duct, intra-and extrahepatic biliary system. The  patient has been having intermittent, vague epigastric pain, nausea over  the past two months. Initial EGD in  did not show anything. She  has been taking Nexium intermittently. She recently developed acholic  stools and jaundice over a week ago. An MRCP two days ago showed  intra-and extrahepatic biliary ductal system without any cholelithiasis. ERCP and EUS are being performed for diagnostic purposes. MEDICATIONS:  MAC per Anesthesia. PROCEDURE DETAILS:  Informed consent obtained after discussing risks,  benefits, and alternatives. Full history and physical was performed. The patient was classified as ASA class III. Medications were given by  Anesthesia. Cardiopulmonary status was continuously monitored  throughout the procedure. The patient underwent endotracheal intubation  for airway protection. The patient was then placed in prone position.    Once adequately positioned, a standard linear echoendoscope was inserted  in the mouth and advanced under direct visualization to the second  portion of the duodenum. The entire mucosa of the esophagus, stomach,  and duodenum were examined carefully. Ultrasound images of the  mediastinum in the AP window, subcarinal space were performed. The  scope was then advanced to the stomach where ultrasound images of the  left lobe of the liver, aorta, celiac axis, pancreas body, tail,  pancreatic duct, splenic artery, splenic vein, left adrenal gland, left  kidney, and spleen were visualized. Scope was then advanced to the  duodenum where ultrasound images of the pancreatic head, uncinate  process, common bile duct, pancreas duct, portal vein, SMV, ampulla were  all visualized. The patient tolerated the procedure well with no  difficulties. Scope was then exchanged in favor of the standard  gastroscope and advanced under direct visualization to the second  portion of the duodenum. The entire mucosa of the esophagus, stomach  (retroflexion and forward views), duodenum (bulb, sweep, and second  portion) were examined carefully during withdrawal.    Scope was then exchanged in favor of therapeutic duodenoscope. The  patient tolerated the procedure well without any difficulties. FINDINGS:    ESOPHAGUS:  Examined the esophagus, appeared normal.  Ultrasound images  of the mediastinum, AP window and subcarinal space were negative for  lymphadenopathy. STOMACH:  Examined portion of stomach appeared normal.  Ultrasound  images of the left lobe of the liver showed normal parenchyma. There  was mild intrahepatic ductal dilation. Aorta and celiac axis were  identified. There was 1.5 x 10 mm lymph node in the celiac axis. This  was sampled using 22-gauge [facility] FNA Slimline needle. Samples were sent for Cytology. Pancreas body and tail were examined  carefully. Pancreas duct measured 1.8 mm as it coursed normally towards  the tail.   Splenic

## 2023-10-21 NOTE — BRIEF OP NOTE
Brief Postoperative Note      Patient: Charles Moser  YOB: 1960  MRN: [id number]    Date of Procedure: 10/20/2023    Pre-Op Diagnosis Codes:     * Jaundice [R17]     * Stones common duct [K80.50]    Post-Op Diagnosis:  duodenal bulb ulcerated mass s/p biopsy, celiac LN s/p FNA, failed CBD cannulation, 4Fx4cm PD stent placed       Procedure(s):  UPPER EUS W/ANES. (15:00)  ERCP STENT INSERTION  EGD W/EUS FNA  EGD BIOPSY    Surgeon(s):  Arin Tierney MD    Assistant:  * No surgical staff found *    Anesthesia: Monitor Anesthesia Care    Estimated Blood Loss (mL): Minimal    Complications: None    Specimens:   ID Type Source Tests Collected by Time Destination   A :  Tissue Biopsy CYTOLOGY, NON-GYN Arin Tierney MD 10/20/2023 1532    B :  Tissue Tissue SURGICAL PATHOLOGY Arin Tierney MD 10/20/2023 1552        Implants:  Implant Name Type Inv.  Item Serial No.  Lot No. LRB No. Used Action   STENT PANCREAS 4FR L4CM PLAS SGL PGTL NO LD [employer] - [id number]  STENT PANCREAS 4FR L4CM PLAS SGL PGTL NO LD [employer]  [employer] [id number]  1 Implanted         Drains: * No LDAs found *    Findings: duodenal bulb ulcerated mass s/p biopsy, celiac LN s/p FNA, failed CBD cannulation, 4Fx4cm PD stent placed    Recommendation  Await pathology and FNA  Referred to  for bile duct decompression  Discussed with Dr. Rodney Galarza at [facility] who will plan for ERCP early next week  Nexium twice daily      Electronically signed by Arin Tierney MD on 10/20/2023 at 8:32 PM

## 2023-11-01 ENCOUNTER — ANESTHESIA EVENT (OUTPATIENT)
Dept: OPERATING ROOM | Age: 63
End: 2023-11-01
Payer: COMMERCIAL

## 2023-11-01 PROBLEM — K21.00 GASTROESOPHAGEAL REFLUX DISEASE WITH ESOPHAGITIS: Status: ACTIVE | Noted: 2023-08-29

## 2023-11-01 RX ORDER — METOPROLOL TARTRATE 50 MG/1
TABLET, FILM COATED ORAL
COMMUNITY
Start: 2023-08-11 | End: 2023-11-02

## 2023-11-02 NOTE — PROGRESS NOTES

## 2023-11-03 ENCOUNTER — HOSPITAL ENCOUNTER (OUTPATIENT)
Age: 63
Setting detail: OUTPATIENT SURGERY
Discharge: HOME | End: 2023-11-03
Attending: SURGERY | Admitting: SURGERY
Payer: COMMERCIAL

## 2023-11-03 ENCOUNTER — APPOINTMENT (OUTPATIENT)
Dept: GENERAL RADIOLOGY | Age: 63
End: 2023-11-03
Attending: SURGERY
Payer: COMMERCIAL

## 2023-11-03 ENCOUNTER — ANESTHESIA (OUTPATIENT)
Dept: OPERATING ROOM | Age: 63
End: 2023-11-03
Payer: COMMERCIAL

## 2023-11-03 VITALS
HEIGHT: 62 IN | TEMPERATURE: 97.3 F | HEART RATE: 66 BPM | WEIGHT: 115 LBS | DIASTOLIC BLOOD PRESSURE: 67 MMHG | BODY MASS INDEX: 21.16 KG/M2 | OXYGEN SATURATION: 98 % | RESPIRATION RATE: 16 BRPM | SYSTOLIC BLOOD PRESSURE: 115 MMHG

## 2023-11-03 PROBLEM — C25.9 PANCREATIC CANCER METASTASIZED TO INTRA-ABDOMINAL LYMPH NODE (HCC): Status: ACTIVE | Noted: 2023-11-03

## 2023-11-03 PROBLEM — C77.2 PANCREATIC CANCER METASTASIZED TO INTRA-ABDOMINAL LYMPH NODE (HCC): Status: ACTIVE | Noted: 2023-11-03

## 2023-11-03 PROCEDURE — A4216 STERILE WATER/SALINE, 10 ML: HCPCS | Performed by: ANESTHESIOLOGY

## 2023-11-03 PROCEDURE — 7100000010 HC PHASE II RECOVERY - FIRST 15 MIN: Performed by: SURGERY

## 2023-11-03 PROCEDURE — 2709999900 HC NON-CHARGEABLE SUPPLY: Performed by: SURGERY

## 2023-11-03 PROCEDURE — 6360000002 HC RX W HCPCS: Performed by: NURSE ANESTHETIST, CERTIFIED REGISTERED

## 2023-11-03 PROCEDURE — 3700000000 HC ANESTHESIA ATTENDED CARE: Performed by: SURGERY

## 2023-11-03 PROCEDURE — 3600000002 HC SURGERY LEVEL 2 BASE: Performed by: SURGERY

## 2023-11-03 PROCEDURE — C1788 PORT, INDWELLING, IMP: HCPCS | Performed by: SURGERY

## 2023-11-03 PROCEDURE — 2580000003 HC RX 258: Performed by: SURGERY

## 2023-11-03 PROCEDURE — 77001 FLUOROGUIDE FOR VEIN DEVICE: CPT

## 2023-11-03 PROCEDURE — 2500000003 HC RX 250 WO HCPCS: Performed by: ANESTHESIOLOGY

## 2023-11-03 PROCEDURE — 6360000002 HC RX W HCPCS: Performed by: SURGERY

## 2023-11-03 PROCEDURE — 2500000003 HC RX 250 WO HCPCS: Performed by: NURSE ANESTHETIST, CERTIFIED REGISTERED

## 2023-11-03 PROCEDURE — 3600000012 HC SURGERY LEVEL 2 ADDTL 15MIN: Performed by: SURGERY

## 2023-11-03 PROCEDURE — 7100000011 HC PHASE II RECOVERY - ADDTL 15 MIN: Performed by: SURGERY

## 2023-11-03 PROCEDURE — 2580000003 HC RX 258: Performed by: NURSE ANESTHETIST, CERTIFIED REGISTERED

## 2023-11-03 PROCEDURE — 2500000003 HC RX 250 WO HCPCS: Performed by: SURGERY

## 2023-11-03 PROCEDURE — A4217 STERILE WATER/SALINE, 500 ML: HCPCS | Performed by: SURGERY

## 2023-11-03 PROCEDURE — 77001 FLUOROGUIDE FOR VEIN DEVICE: CPT | Performed by: SURGERY

## 2023-11-03 PROCEDURE — 36561 INSERT TUNNELED CV CATH: CPT | Performed by: SURGERY

## 2023-11-03 PROCEDURE — 3700000001 HC ADD 15 MINUTES (ANESTHESIA): Performed by: SURGERY

## 2023-11-03 PROCEDURE — 2580000003 HC RX 258: Performed by: ANESTHESIOLOGY

## 2023-11-03 PROCEDURE — 71045 X-RAY EXAM CHEST 1 VIEW: CPT

## 2023-11-03 DEVICE — PORT INFUS 8FR PWR INJ CT FOR VASC ACCS CATH: Type: IMPLANTABLE DEVICE | Site: CHEST | Status: FUNCTIONAL

## 2023-11-03 RX ORDER — LIDOCAINE HYDROCHLORIDE 20 MG/ML
INJECTION, SOLUTION EPIDURAL; INFILTRATION; INTRACAUDAL; PERINEURAL PRN
Status: DISCONTINUED | OUTPATIENT
Start: 2023-11-03 | End: 2023-11-03 | Stop reason: SDUPTHER

## 2023-11-03 RX ORDER — SODIUM CHLORIDE 9 MG/ML
INJECTION, SOLUTION INTRAVENOUS PRN
Status: DISCONTINUED | OUTPATIENT
Start: 2023-11-03 | End: 2023-11-03 | Stop reason: HOSPADM

## 2023-11-03 RX ORDER — MIDAZOLAM HYDROCHLORIDE 1 MG/ML
INJECTION INTRAMUSCULAR; INTRAVENOUS PRN
Status: DISCONTINUED | OUTPATIENT
Start: 2023-11-03 | End: 2023-11-03 | Stop reason: SDUPTHER

## 2023-11-03 RX ORDER — OXYCODONE HYDROCHLORIDE 5 MG/1
5 TABLET ORAL PRN
Status: DISCONTINUED | OUTPATIENT
Start: 2023-11-03 | End: 2023-11-03 | Stop reason: HOSPADM

## 2023-11-03 RX ORDER — SODIUM CHLORIDE, SODIUM LACTATE, POTASSIUM CHLORIDE, CALCIUM CHLORIDE 600; 310; 30; 20 MG/100ML; MG/100ML; MG/100ML; MG/100ML
INJECTION, SOLUTION INTRAVENOUS CONTINUOUS PRN
Status: DISCONTINUED | OUTPATIENT
Start: 2023-11-03 | End: 2023-11-03 | Stop reason: SDUPTHER

## 2023-11-03 RX ORDER — PROPOFOL 10 MG/ML
INJECTION, EMULSION INTRAVENOUS PRN
Status: DISCONTINUED | OUTPATIENT
Start: 2023-11-03 | End: 2023-11-03 | Stop reason: SDUPTHER

## 2023-11-03 RX ORDER — SODIUM CHLORIDE 0.9 % (FLUSH) 0.9 %
5-40 SYRINGE (ML) INJECTION EVERY 12 HOURS SCHEDULED
Status: DISCONTINUED | OUTPATIENT
Start: 2023-11-03 | End: 2023-11-03 | Stop reason: HOSPADM

## 2023-11-03 RX ORDER — SODIUM CHLORIDE 0.9 % (FLUSH) 0.9 %
5-40 SYRINGE (ML) INJECTION PRN
Status: DISCONTINUED | OUTPATIENT
Start: 2023-11-03 | End: 2023-11-03 | Stop reason: HOSPADM

## 2023-11-03 RX ORDER — ONDANSETRON 2 MG/ML
4 INJECTION INTRAMUSCULAR; INTRAVENOUS
Status: DISCONTINUED | OUTPATIENT
Start: 2023-11-03 | End: 2023-11-03 | Stop reason: HOSPADM

## 2023-11-03 RX ORDER — HEPARIN 100 UNIT/ML
SYRINGE INTRAVENOUS PRN
Status: DISCONTINUED | OUTPATIENT
Start: 2023-11-03 | End: 2023-11-03 | Stop reason: ALTCHOICE

## 2023-11-03 RX ORDER — MEPERIDINE HYDROCHLORIDE 25 MG/ML
12.5 INJECTION INTRAMUSCULAR; INTRAVENOUS; SUBCUTANEOUS EVERY 5 MIN PRN
Status: DISCONTINUED | OUTPATIENT
Start: 2023-11-03 | End: 2023-11-03 | Stop reason: HOSPADM

## 2023-11-03 RX ORDER — SODIUM CHLORIDE, SODIUM LACTATE, POTASSIUM CHLORIDE, CALCIUM CHLORIDE 600; 310; 30; 20 MG/100ML; MG/100ML; MG/100ML; MG/100ML
INJECTION, SOLUTION INTRAVENOUS CONTINUOUS
Status: DISCONTINUED | OUTPATIENT
Start: 2023-11-03 | End: 2023-11-03 | Stop reason: HOSPADM

## 2023-11-03 RX ORDER — OXYCODONE HYDROCHLORIDE 5 MG/1
10 TABLET ORAL PRN
Status: DISCONTINUED | OUTPATIENT
Start: 2023-11-03 | End: 2023-11-03 | Stop reason: HOSPADM

## 2023-11-03 RX ADMIN — FAMOTIDINE 20 MG: 10 INJECTION, SOLUTION INTRAVENOUS at 10:42

## 2023-11-03 RX ADMIN — SODIUM CHLORIDE, POTASSIUM CHLORIDE, SODIUM LACTATE AND CALCIUM CHLORIDE: 600; 310; 30; 20 INJECTION, SOLUTION INTRAVENOUS at 11:17

## 2023-11-03 RX ADMIN — CEFAZOLIN 2000 MG: 2 INJECTION, POWDER, FOR SOLUTION INTRAMUSCULAR; INTRAVENOUS at 11:18

## 2023-11-03 RX ADMIN — LIDOCAINE HYDROCHLORIDE 60 MG: 20 INJECTION, SOLUTION EPIDURAL; INFILTRATION; INTRACAUDAL; PERINEURAL at 11:22

## 2023-11-03 RX ADMIN — MIDAZOLAM 2 MG: 1 INJECTION INTRAMUSCULAR; INTRAVENOUS at 11:17

## 2023-11-03 RX ADMIN — PROPOFOL 200 MG: 10 INJECTION, EMULSION INTRAVENOUS at 11:22

## 2023-11-03 RX ADMIN — SODIUM CHLORIDE, POTASSIUM CHLORIDE, SODIUM LACTATE AND CALCIUM CHLORIDE: 600; 310; 30; 20 INJECTION, SOLUTION INTRAVENOUS at 10:42

## 2023-11-03 ASSESSMENT — PAIN - FUNCTIONAL ASSESSMENT: PAIN_FUNCTIONAL_ASSESSMENT: NONE - DENIES PAIN

## 2023-11-03 ASSESSMENT — ENCOUNTER SYMPTOMS: SHORTNESS OF BREATH: 0

## 2023-11-03 ASSESSMENT — LIFESTYLE VARIABLES: SMOKING_STATUS: 0

## 2023-11-03 NOTE — OP NOTE
[address] [facility], [address]                                OPERATIVE REPORT    PATIENT NAME: Joce Prince                     :        1960  MED REC NO:   [id number]                          ROOM:  ACCOUNT NO:   [de-identified]                           ADMIT DATE: 2023  PROVIDER:     Neha Lopez. MD [place]    DATE OF PROCEDURE:  2023    PREOPERATIVE DIAGNOSIS:  Metastatic pancreatic cancer. POSTOPERATIVE DIAGNOSIS:  Metastatic pancreatic cancer. PROCEDURE:  Port-A-Cath placement with surgeon's use of fluoroscopy. ANESTHESIA:  Local with MAC. SURGEON:  Neha Blackwell MD.    ESTIMATED BLOOD LOSS:  Less than 50 mL. INDICATIONS:  The patient is a 61-year-old woman recently diagnosed with  metastatic pancreatic cancer. She needs long-term IV access for  chemotherapy. OPERATIVE SUMMARY:  After preoperative evaluation the patient was  brought in the operating suite and placed in a comfortable supine  position on the operating room table. Monitoring equipment was attached  and she was given intravenous sedation per Anesthesia. She was placed  in a Trendelenburg position, and her neck and shoulders were sterilely  prepped in draped. The left subclavian region was anesthetized with  local anesthetic and the subclavian vein was easily accessed. A wire  was passed and fluoro showed this to be in good position. A small skin  incision was created around the wire, and a subcutaneous pocket was  created inferior to the incision. The sheath and dilator were passed  over the wire under fluoroscopic guidance, and the wire and dilator were  removed. The catheter was passed through the sheath, and the sheath was  peeled away. The tip of the catheter was placed at the atriocaval  junction and cut at the skin level. It was attached to the port using  the locking clip.   The port was placed in the subcutaneous

## 2023-11-03 NOTE — PROGRESS NOTES
Patient admitted from OR to PACU. Bedside report received. Patient immediately hooked up to vitals machine. Patient sedated from surgery. Jaqueline Foy RN

## 2023-11-03 NOTE — PROGRESS NOTES
No pneumo noted. Patient okay for discharge. Patient discharged via wheelchair in stable condition with all belongings to private car. Be Amezquita RN

## 2023-11-03 NOTE — PROGRESS NOTES
Discharge instructions given to patient and patients . Both deny any questions at this time. Bernie Mcgowan RN

## 2023-11-03 NOTE — H&P
I have reviewed the progress note of Dr. Debra Yo serving as history and physical dated November/1/2023 (in [address]) and examined the patient and find no relevant changes. I have reviewed with the patient and/or family the risks, benefits, and alternatives to the procedure.

## 2024-09-09 ENCOUNTER — HOSPITAL ENCOUNTER (INPATIENT)
Facility: MEDICAL CENTER | Age: 64
LOS: 7 days | DRG: 445 | End: 2024-09-16
Attending: STUDENT IN AN ORGANIZED HEALTH CARE EDUCATION/TRAINING PROGRAM | Admitting: STUDENT IN AN ORGANIZED HEALTH CARE EDUCATION/TRAINING PROGRAM
Payer: COMMERCIAL

## 2024-09-09 ENCOUNTER — HOSPITAL ENCOUNTER (OUTPATIENT)
Dept: RADIOLOGY | Facility: MEDICAL CENTER | Age: 64
End: 2024-09-09

## 2024-09-09 DIAGNOSIS — C25.9 MALIGNANT NEOPLASM OF PANCREAS, UNSPECIFIED LOCATION OF MALIGNANCY (HCC): ICD-10-CM

## 2024-09-09 DIAGNOSIS — T85.590A OBSTRUCTION OF BILIARY STENT, INITIAL ENCOUNTER: ICD-10-CM

## 2024-09-09 DIAGNOSIS — T85.590A: ICD-10-CM

## 2024-09-09 DIAGNOSIS — D64.9 NORMOCYTIC ANEMIA: ICD-10-CM

## 2024-09-09 DIAGNOSIS — C25.9 PANCREATIC CANCER METASTASIZED TO LIVER (HCC): ICD-10-CM

## 2024-09-09 DIAGNOSIS — C78.7 PANCREATIC CANCER METASTASIZED TO LIVER (HCC): ICD-10-CM

## 2024-09-09 DIAGNOSIS — R10.9 ABDOMINAL PAIN, UNSPECIFIED ABDOMINAL LOCATION: ICD-10-CM

## 2024-09-09 LAB
ALBUMIN SERPL BCP-MCNC: 3.4 G/DL (ref 3.2–4.9)
ALBUMIN/GLOB SERPL: 1 G/DL
ALP SERPL-CCNC: 2226 U/L (ref 30–99)
ALT SERPL-CCNC: 120 U/L (ref 2–50)
ANION GAP SERPL CALC-SCNC: 14 MMOL/L (ref 7–16)
ANISOCYTOSIS BLD QL SMEAR: ABNORMAL
AST SERPL-CCNC: 113 U/L (ref 12–45)
BASOPHILS # BLD AUTO: 0.9 % (ref 0–1.8)
BASOPHILS # BLD: 0.05 K/UL (ref 0–0.12)
BILIRUB CONJ SERPL-MCNC: 6.1 MG/DL (ref 0.1–0.5)
BILIRUB INDIRECT SERPL-MCNC: 1.2 MG/DL (ref 0–1)
BILIRUB SERPL-MCNC: 7.3 MG/DL (ref 0.1–1.5)
BUN SERPL-MCNC: 13 MG/DL (ref 8–22)
CALCIUM ALBUM COR SERPL-MCNC: 10 MG/DL (ref 8.5–10.5)
CALCIUM SERPL-MCNC: 9.5 MG/DL (ref 8.5–10.5)
CHLORIDE SERPL-SCNC: 98 MMOL/L (ref 96–112)
CO2 SERPL-SCNC: 22 MMOL/L (ref 20–33)
CREAT SERPL-MCNC: 0.2 MG/DL (ref 0.5–1.4)
EOSINOPHIL # BLD AUTO: 0.04 K/UL (ref 0–0.51)
EOSINOPHIL NFR BLD: 0.8 % (ref 0–6.9)
ERYTHROCYTE [DISTWIDTH] IN BLOOD BY AUTOMATED COUNT: 75.5 FL (ref 35.9–50)
GFR SERPLBLD CREATININE-BSD FMLA CKD-EPI: 132 ML/MIN/1.73 M 2
GLOBULIN SER CALC-MCNC: 3.5 G/DL (ref 1.9–3.5)
GLUCOSE SERPL-MCNC: 106 MG/DL (ref 65–99)
HCT VFR BLD AUTO: 24.3 % (ref 37–47)
HGB BLD-MCNC: 7.9 G/DL (ref 12–16)
HYPOCHROMIA BLD QL SMEAR: ABNORMAL
LIPASE SERPL-CCNC: 6 U/L (ref 11–82)
LYMPHOCYTES # BLD AUTO: 0.71 K/UL (ref 1–4.8)
LYMPHOCYTES NFR BLD: 13.9 % (ref 22–41)
MACROCYTES BLD QL SMEAR: ABNORMAL
MAGNESIUM SERPL-MCNC: 1.9 MG/DL (ref 1.5–2.5)
MANUAL DIFF BLD: NORMAL
MCH RBC QN AUTO: 27.6 PG (ref 27–33)
MCHC RBC AUTO-ENTMCNC: 32.5 G/DL (ref 32.2–35.5)
MCV RBC AUTO: 85 FL (ref 81.4–97.8)
MONOCYTES # BLD AUTO: 0.58 K/UL (ref 0–0.85)
MONOCYTES NFR BLD AUTO: 11.3 % (ref 0–13.4)
MORPHOLOGY BLD-IMP: NORMAL
NEUTROPHILS # BLD AUTO: 3.73 K/UL (ref 1.82–7.42)
NEUTROPHILS NFR BLD: 72.2 % (ref 44–72)
NEUTS BAND NFR BLD MANUAL: 0.9 % (ref 0–10)
NRBC # BLD AUTO: 0.02 K/UL
NRBC BLD-RTO: 0.4 /100 WBC (ref 0–0.2)
PLATELET # BLD AUTO: 351 K/UL (ref 164–446)
PLATELET BLD QL SMEAR: NORMAL
PMV BLD AUTO: 9.2 FL (ref 9–12.9)
POIKILOCYTOSIS BLD QL SMEAR: NORMAL
POTASSIUM SERPL-SCNC: 4.2 MMOL/L (ref 3.6–5.5)
PROT SERPL-MCNC: 6.9 G/DL (ref 6–8.2)
RBC # BLD AUTO: 2.86 M/UL (ref 4.2–5.4)
RBC BLD AUTO: PRESENT
SODIUM SERPL-SCNC: 134 MMOL/L (ref 135–145)
TARGETS BLD QL SMEAR: NORMAL
WBC # BLD AUTO: 5.1 K/UL (ref 4.8–10.8)

## 2024-09-09 PROCEDURE — 36415 COLL VENOUS BLD VENIPUNCTURE: CPT

## 2024-09-09 PROCEDURE — 700111 HCHG RX REV CODE 636 W/ 250 OVERRIDE (IP): Performed by: STUDENT IN AN ORGANIZED HEALTH CARE EDUCATION/TRAINING PROGRAM

## 2024-09-09 PROCEDURE — 83690 ASSAY OF LIPASE: CPT

## 2024-09-09 PROCEDURE — 85027 COMPLETE CBC AUTOMATED: CPT

## 2024-09-09 PROCEDURE — 82248 BILIRUBIN DIRECT: CPT

## 2024-09-09 PROCEDURE — 700101 HCHG RX REV CODE 250: Performed by: STUDENT IN AN ORGANIZED HEALTH CARE EDUCATION/TRAINING PROGRAM

## 2024-09-09 PROCEDURE — 83735 ASSAY OF MAGNESIUM: CPT

## 2024-09-09 PROCEDURE — 80053 COMPREHEN METABOLIC PANEL: CPT

## 2024-09-09 PROCEDURE — A9270 NON-COVERED ITEM OR SERVICE: HCPCS

## 2024-09-09 PROCEDURE — 700105 HCHG RX REV CODE 258: Performed by: STUDENT IN AN ORGANIZED HEALTH CARE EDUCATION/TRAINING PROGRAM

## 2024-09-09 PROCEDURE — 700102 HCHG RX REV CODE 250 W/ 637 OVERRIDE(OP)

## 2024-09-09 PROCEDURE — 85007 BL SMEAR W/DIFF WBC COUNT: CPT

## 2024-09-09 PROCEDURE — 770006 HCHG ROOM/CARE - MED/SURG/GYN SEMI*

## 2024-09-09 PROCEDURE — 99285 EMERGENCY DEPT VISIT HI MDM: CPT

## 2024-09-09 PROCEDURE — 96374 THER/PROPH/DIAG INJ IV PUSH: CPT

## 2024-09-09 PROCEDURE — 99999 PR NO CHARGE: CPT | Mod: GC | Performed by: STUDENT IN AN ORGANIZED HEALTH CARE EDUCATION/TRAINING PROGRAM

## 2024-09-09 PROCEDURE — 700105 HCHG RX REV CODE 258

## 2024-09-09 PROCEDURE — 700111 HCHG RX REV CODE 636 W/ 250 OVERRIDE (IP)

## 2024-09-09 RX ORDER — OXYCODONE HYDROCHLORIDE 5 MG/1
5 TABLET ORAL
Status: DISCONTINUED | OUTPATIENT
Start: 2024-09-09 | End: 2024-09-09 | Stop reason: SINTOL

## 2024-09-09 RX ORDER — ACETAMINOPHEN 500 MG
1000 TABLET ORAL EVERY 6 HOURS
Status: DISCONTINUED | OUTPATIENT
Start: 2024-09-09 | End: 2024-09-09 | Stop reason: SINTOL

## 2024-09-09 RX ORDER — SODIUM CHLORIDE 9 MG/ML
INJECTION, SOLUTION INTRAVENOUS CONTINUOUS
Status: DISCONTINUED | OUTPATIENT
Start: 2024-09-09 | End: 2024-09-14

## 2024-09-09 RX ORDER — SODIUM CHLORIDE 9 MG/ML
1000 INJECTION, SOLUTION INTRAVENOUS ONCE
Status: COMPLETED | OUTPATIENT
Start: 2024-09-09 | End: 2024-09-09

## 2024-09-09 RX ORDER — PANCRELIPASE 60000; 12000; 38000 [USP'U]/1; [USP'U]/1; [USP'U]/1
1-2 CAPSULE, DELAYED RELEASE PELLETS ORAL
Status: ON HOLD | COMMUNITY
End: 2024-09-13

## 2024-09-09 RX ORDER — ACETAMINOPHEN 500 MG
1000 TABLET ORAL EVERY 6 HOURS PRN
Status: DISCONTINUED | OUTPATIENT
Start: 2024-09-14 | End: 2024-09-09 | Stop reason: SINTOL

## 2024-09-09 RX ORDER — HYDROMORPHONE HYDROCHLORIDE 1 MG/ML
1 INJECTION, SOLUTION INTRAMUSCULAR; INTRAVENOUS; SUBCUTANEOUS ONCE
Status: COMPLETED | OUTPATIENT
Start: 2024-09-09 | End: 2024-09-09

## 2024-09-09 RX ORDER — AMOXICILLIN 250 MG
2 CAPSULE ORAL EVERY EVENING
Status: DISCONTINUED | OUTPATIENT
Start: 2024-09-09 | End: 2024-09-10

## 2024-09-09 RX ORDER — IVERMECTIN 3 MG/1
16 TABLET ORAL 3 TIMES DAILY
COMMUNITY

## 2024-09-09 RX ORDER — PROPYLENE GLYCOL 0.06 MG/ML
1 EMULSION OPHTHALMIC PRN
COMMUNITY

## 2024-09-09 RX ORDER — AZITHROMYCIN 250 MG/1
250 TABLET, FILM COATED ORAL DAILY
COMMUNITY

## 2024-09-09 RX ORDER — ONDANSETRON 2 MG/ML
4 INJECTION INTRAMUSCULAR; INTRAVENOUS EVERY 4 HOURS PRN
Status: DISCONTINUED | OUTPATIENT
Start: 2024-09-09 | End: 2024-09-10

## 2024-09-09 RX ORDER — OXYCODONE HYDROCHLORIDE 10 MG/1
10 TABLET ORAL
Status: DISCONTINUED | OUTPATIENT
Start: 2024-09-09 | End: 2024-09-09 | Stop reason: SINTOL

## 2024-09-09 RX ORDER — HYDROMORPHONE HYDROCHLORIDE 1 MG/ML
0.5 INJECTION, SOLUTION INTRAMUSCULAR; INTRAVENOUS; SUBCUTANEOUS
Status: DISCONTINUED | OUTPATIENT
Start: 2024-09-09 | End: 2024-09-10

## 2024-09-09 RX ORDER — LOVASTATIN 10 MG
10 TABLET ORAL DAILY
COMMUNITY

## 2024-09-09 RX ORDER — MORPHINE SULFATE 100 MG/5ML
10 SOLUTION ORAL EVERY 6 HOURS PRN
Status: ON HOLD | COMMUNITY
End: 2024-09-16

## 2024-09-09 RX ORDER — CYCLOSPORINE 0.5 MG/ML
1 EMULSION OPHTHALMIC
COMMUNITY

## 2024-09-09 RX ORDER — PHENOL 1.4 %
60 AEROSOL, SPRAY (ML) MUCOUS MEMBRANE
COMMUNITY

## 2024-09-09 RX ORDER — LORATADINE 10 MG/1
10 TABLET ORAL DAILY
COMMUNITY

## 2024-09-09 RX ORDER — POLYETHYLENE GLYCOL 3350 17 G/17G
1 POWDER, FOR SOLUTION ORAL
Status: DISCONTINUED | OUTPATIENT
Start: 2024-09-09 | End: 2024-09-10

## 2024-09-09 RX ADMIN — SENNOSIDES AND DOCUSATE SODIUM 2 TABLET: 50; 8.6 TABLET ORAL at 21:02

## 2024-09-09 RX ADMIN — HYDROMORPHONE HYDROCHLORIDE 0.5 MG: 1 INJECTION, SOLUTION INTRAMUSCULAR; INTRAVENOUS; SUBCUTANEOUS at 22:15

## 2024-09-09 RX ADMIN — SODIUM CHLORIDE 1000 ML: 9 INJECTION, SOLUTION INTRAVENOUS at 18:32

## 2024-09-09 RX ADMIN — ONDANSETRON 4 MG: 2 INJECTION INTRAMUSCULAR; INTRAVENOUS at 22:27

## 2024-09-09 RX ADMIN — ACETAMINOPHEN 1000 MG: 500 TABLET ORAL at 20:59

## 2024-09-09 RX ADMIN — HYDROMORPHONE HYDROCHLORIDE 1 MG: 1 INJECTION, SOLUTION INTRAMUSCULAR; INTRAVENOUS; SUBCUTANEOUS at 18:11

## 2024-09-09 RX ADMIN — Medication 3 ML: at 18:20

## 2024-09-09 RX ADMIN — SODIUM CHLORIDE: 9 INJECTION, SOLUTION INTRAVENOUS at 22:18

## 2024-09-09 ASSESSMENT — PAIN DESCRIPTION - PAIN TYPE
TYPE: ACUTE PAIN

## 2024-09-10 ENCOUNTER — APPOINTMENT (OUTPATIENT)
Dept: RADIOLOGY | Facility: MEDICAL CENTER | Age: 64
DRG: 445 | End: 2024-09-10
Attending: INTERNAL MEDICINE
Payer: COMMERCIAL

## 2024-09-10 PROBLEM — T85.590A: Status: RESOLVED | Noted: 2024-09-09 | Resolved: 2024-09-10

## 2024-09-10 PROBLEM — R10.9 ABDOMINAL PAIN: Status: ACTIVE | Noted: 2024-09-10

## 2024-09-10 PROBLEM — E44.0 MODERATE MALNUTRITION (HCC): Status: ACTIVE | Noted: 2024-09-10

## 2024-09-10 LAB
ALBUMIN SERPL BCP-MCNC: 3.3 G/DL (ref 3.2–4.9)
ALBUMIN/GLOB SERPL: 1 G/DL
ALP SERPL-CCNC: 2051 U/L (ref 30–99)
ALT SERPL-CCNC: 107 U/L (ref 2–50)
ANION GAP SERPL CALC-SCNC: 14 MMOL/L (ref 7–16)
APPEARANCE UR: CLEAR
AST SERPL-CCNC: 93 U/L (ref 12–45)
BASOPHILS # BLD AUTO: 1.1 % (ref 0–1.8)
BASOPHILS # BLD: 0.05 K/UL (ref 0–0.12)
BILIRUB SERPL-MCNC: 7.6 MG/DL (ref 0.1–1.5)
BILIRUB UR QL STRIP.AUTO: NEGATIVE
BUN SERPL-MCNC: 11 MG/DL (ref 8–22)
CALCIUM ALBUM COR SERPL-MCNC: 9.8 MG/DL (ref 8.5–10.5)
CALCIUM SERPL-MCNC: 9.2 MG/DL (ref 8.5–10.5)
CHLORIDE SERPL-SCNC: 101 MMOL/L (ref 96–112)
CO2 SERPL-SCNC: 21 MMOL/L (ref 20–33)
COLOR UR: NORMAL
CREAT SERPL-MCNC: 0.29 MG/DL (ref 0.5–1.4)
EOSINOPHIL # BLD AUTO: 0.06 K/UL (ref 0–0.51)
EOSINOPHIL NFR BLD: 1.3 % (ref 0–6.9)
ERYTHROCYTE [DISTWIDTH] IN BLOOD BY AUTOMATED COUNT: 77.6 FL (ref 35.9–50)
GFR SERPLBLD CREATININE-BSD FMLA CKD-EPI: 120 ML/MIN/1.73 M 2
GLOBULIN SER CALC-MCNC: 3.3 G/DL (ref 1.9–3.5)
GLUCOSE SERPL-MCNC: 98 MG/DL (ref 65–99)
GLUCOSE UR STRIP.AUTO-MCNC: NEGATIVE MG/DL
HCT VFR BLD AUTO: 23.8 % (ref 37–47)
HGB BLD-MCNC: 7.5 G/DL (ref 12–16)
IMM GRANULOCYTES # BLD AUTO: 0.09 K/UL (ref 0–0.11)
IMM GRANULOCYTES NFR BLD AUTO: 2 % (ref 0–0.9)
KETONES UR STRIP.AUTO-MCNC: NEGATIVE MG/DL
LEUKOCYTE ESTERASE UR QL STRIP.AUTO: NEGATIVE
LYMPHOCYTES # BLD AUTO: 0.75 K/UL (ref 1–4.8)
LYMPHOCYTES NFR BLD: 16.4 % (ref 22–41)
MCH RBC QN AUTO: 27.1 PG (ref 27–33)
MCHC RBC AUTO-ENTMCNC: 31.5 G/DL (ref 32.2–35.5)
MCV RBC AUTO: 85.9 FL (ref 81.4–97.8)
MICRO URNS: NORMAL
MONOCYTES # BLD AUTO: 0.86 K/UL (ref 0–0.85)
MONOCYTES NFR BLD AUTO: 18.9 % (ref 0–13.4)
NEUTROPHILS # BLD AUTO: 2.75 K/UL (ref 1.82–7.42)
NEUTROPHILS NFR BLD: 60.3 % (ref 44–72)
NITRITE UR QL STRIP.AUTO: NEGATIVE
NRBC # BLD AUTO: 0.02 K/UL
NRBC BLD-RTO: 0.4 /100 WBC (ref 0–0.2)
PH UR STRIP.AUTO: 6.5 [PH] (ref 5–8)
PHOSPHATE SERPL-MCNC: 3.7 MG/DL (ref 2.5–4.5)
PLATELET # BLD AUTO: 335 K/UL (ref 164–446)
PMV BLD AUTO: 9.9 FL (ref 9–12.9)
POTASSIUM SERPL-SCNC: 4.2 MMOL/L (ref 3.6–5.5)
PROT SERPL-MCNC: 6.6 G/DL (ref 6–8.2)
PROT UR QL STRIP: NEGATIVE MG/DL
RBC # BLD AUTO: 2.77 M/UL (ref 4.2–5.4)
RBC UR QL AUTO: NEGATIVE
SODIUM SERPL-SCNC: 136 MMOL/L (ref 135–145)
SP GR UR STRIP.AUTO: 1.01
UROBILINOGEN UR STRIP.AUTO-MCNC: 0.2 MG/DL
WBC # BLD AUTO: 4.6 K/UL (ref 4.8–10.8)

## 2024-09-10 PROCEDURE — 700102 HCHG RX REV CODE 250 W/ 637 OVERRIDE(OP)

## 2024-09-10 PROCEDURE — 700111 HCHG RX REV CODE 636 W/ 250 OVERRIDE (IP): Mod: JZ

## 2024-09-10 PROCEDURE — A9270 NON-COVERED ITEM OR SERVICE: HCPCS

## 2024-09-10 PROCEDURE — 84100 ASSAY OF PHOSPHORUS: CPT

## 2024-09-10 PROCEDURE — 700105 HCHG RX REV CODE 258

## 2024-09-10 PROCEDURE — A9579 GAD-BASE MR CONTRAST NOS,1ML: HCPCS | Mod: JZ | Performed by: INTERNAL MEDICINE

## 2024-09-10 PROCEDURE — 80053 COMPREHEN METABOLIC PANEL: CPT

## 2024-09-10 PROCEDURE — 99222 1ST HOSP IP/OBS MODERATE 55: CPT | Performed by: INTERNAL MEDICINE

## 2024-09-10 PROCEDURE — 81003 URINALYSIS AUTO W/O SCOPE: CPT

## 2024-09-10 PROCEDURE — 74183 MRI ABD W/O CNTR FLWD CNTR: CPT

## 2024-09-10 PROCEDURE — 770004 HCHG ROOM/CARE - ONCOLOGY PRIVATE *

## 2024-09-10 PROCEDURE — 700117 HCHG RX CONTRAST REV CODE 255: Mod: JZ | Performed by: INTERNAL MEDICINE

## 2024-09-10 PROCEDURE — 85025 COMPLETE CBC W/AUTO DIFF WBC: CPT

## 2024-09-10 PROCEDURE — 700111 HCHG RX REV CODE 636 W/ 250 OVERRIDE (IP)

## 2024-09-10 RX ORDER — HYDROMORPHONE HYDROCHLORIDE 1 MG/ML
1 INJECTION, SOLUTION INTRAMUSCULAR; INTRAVENOUS; SUBCUTANEOUS
Status: DISCONTINUED | OUTPATIENT
Start: 2024-09-10 | End: 2024-09-10

## 2024-09-10 RX ORDER — POLYETHYLENE GLYCOL 3350 17 G/17G
1 POWDER, FOR SOLUTION ORAL 2 TIMES DAILY PRN
Status: DISCONTINUED | OUTPATIENT
Start: 2024-09-10 | End: 2024-09-11

## 2024-09-10 RX ORDER — HYDROMORPHONE HYDROCHLORIDE 2 MG/1
1 TABLET ORAL
Status: DISCONTINUED | OUTPATIENT
Start: 2024-09-10 | End: 2024-09-10

## 2024-09-10 RX ORDER — HYDROMORPHONE HYDROCHLORIDE 1 MG/ML
1 INJECTION, SOLUTION INTRAMUSCULAR; INTRAVENOUS; SUBCUTANEOUS
Status: DISCONTINUED | OUTPATIENT
Start: 2024-09-10 | End: 2024-09-11

## 2024-09-10 RX ORDER — ONDANSETRON 2 MG/ML
4 INJECTION INTRAMUSCULAR; INTRAVENOUS ONCE
Status: COMPLETED | OUTPATIENT
Start: 2024-09-10 | End: 2024-09-10

## 2024-09-10 RX ORDER — HYDROMORPHONE HYDROCHLORIDE 1 MG/ML
0.5 INJECTION, SOLUTION INTRAMUSCULAR; INTRAVENOUS; SUBCUTANEOUS ONCE
Status: COMPLETED | OUTPATIENT
Start: 2024-09-10 | End: 2024-09-10

## 2024-09-10 RX ORDER — HYDROMORPHONE HYDROCHLORIDE 2 MG/1
2 TABLET ORAL
Status: DISCONTINUED | OUTPATIENT
Start: 2024-09-10 | End: 2024-09-10

## 2024-09-10 RX ORDER — HYDROMORPHONE HYDROCHLORIDE 2 MG/1
2 TABLET ORAL
Status: DISCONTINUED | OUTPATIENT
Start: 2024-09-10 | End: 2024-09-11

## 2024-09-10 RX ORDER — ONDANSETRON 2 MG/ML
4 INJECTION INTRAMUSCULAR; INTRAVENOUS
Status: DISCONTINUED | OUTPATIENT
Start: 2024-09-10 | End: 2024-09-17 | Stop reason: HOSPADM

## 2024-09-10 RX ORDER — AMOXICILLIN 250 MG
2 CAPSULE ORAL EVERY EVENING
Status: DISCONTINUED | OUTPATIENT
Start: 2024-09-10 | End: 2024-09-11

## 2024-09-10 RX ORDER — HYDROMORPHONE HYDROCHLORIDE 4 MG/1
4 TABLET ORAL
Status: DISCONTINUED | OUTPATIENT
Start: 2024-09-10 | End: 2024-09-11

## 2024-09-10 RX ADMIN — SODIUM CHLORIDE: 9 INJECTION, SOLUTION INTRAVENOUS at 08:15

## 2024-09-10 RX ADMIN — HYDROMORPHONE HYDROCHLORIDE 4 MG: 4 TABLET ORAL at 15:53

## 2024-09-10 RX ADMIN — ONDANSETRON 4 MG: 2 INJECTION INTRAMUSCULAR; INTRAVENOUS at 21:09

## 2024-09-10 RX ADMIN — SENNOSIDES AND DOCUSATE SODIUM 2 TABLET: 50; 8.6 TABLET ORAL at 17:27

## 2024-09-10 RX ADMIN — HYDROMORPHONE HYDROCHLORIDE 1 MG: 1 INJECTION, SOLUTION INTRAMUSCULAR; INTRAVENOUS; SUBCUTANEOUS at 11:45

## 2024-09-10 RX ADMIN — SODIUM CHLORIDE: 9 INJECTION, SOLUTION INTRAVENOUS at 18:32

## 2024-09-10 RX ADMIN — HYDROMORPHONE HYDROCHLORIDE 1 MG: 1 INJECTION, SOLUTION INTRAMUSCULAR; INTRAVENOUS; SUBCUTANEOUS at 21:09

## 2024-09-10 RX ADMIN — MAGNESIUM HYDROXIDE 30 ML: 1200 LIQUID ORAL at 19:56

## 2024-09-10 RX ADMIN — HYDROMORPHONE HYDROCHLORIDE 2 MG: 2 TABLET ORAL at 10:27

## 2024-09-10 RX ADMIN — ONDANSETRON 4 MG: 2 INJECTION INTRAMUSCULAR; INTRAVENOUS at 08:09

## 2024-09-10 RX ADMIN — HYDROMORPHONE HYDROCHLORIDE 4 MG: 4 TABLET ORAL at 19:00

## 2024-09-10 RX ADMIN — ONDANSETRON 4 MG: 2 INJECTION INTRAMUSCULAR; INTRAVENOUS at 02:34

## 2024-09-10 RX ADMIN — GADOTERIDOL 9 ML: 279.3 INJECTION, SOLUTION INTRAVENOUS at 10:00

## 2024-09-10 RX ADMIN — ONDANSETRON 4 MG: 2 INJECTION INTRAMUSCULAR; INTRAVENOUS at 11:46

## 2024-09-10 RX ADMIN — POLYETHYLENE GLYCOL 3350 1 PACKET: 17 POWDER, FOR SOLUTION ORAL at 10:27

## 2024-09-10 RX ADMIN — HYDROMORPHONE HYDROCHLORIDE 0.5 MG: 1 INJECTION, SOLUTION INTRAMUSCULAR; INTRAVENOUS; SUBCUTANEOUS at 08:08

## 2024-09-10 RX ADMIN — HYDROMORPHONE HYDROCHLORIDE 0.5 MG: 1 INJECTION, SOLUTION INTRAMUSCULAR; INTRAVENOUS; SUBCUTANEOUS at 05:55

## 2024-09-10 RX ADMIN — HYDROMORPHONE HYDROCHLORIDE 0.5 MG: 1 INJECTION, SOLUTION INTRAMUSCULAR; INTRAVENOUS; SUBCUTANEOUS at 02:34

## 2024-09-10 ASSESSMENT — COGNITIVE AND FUNCTIONAL STATUS - GENERAL
DAILY ACTIVITIY SCORE: 24
MOBILITY SCORE: 24
SUGGESTED CMS G CODE MODIFIER DAILY ACTIVITY: CH
SUGGESTED CMS G CODE MODIFIER MOBILITY: CH

## 2024-09-10 ASSESSMENT — LIFESTYLE VARIABLES
HAVE YOU EVER FELT YOU SHOULD CUT DOWN ON YOUR DRINKING: NO
ALCOHOL_USE: NO
CONSUMPTION TOTAL: NEGATIVE
TOTAL SCORE: 0
TOTAL SCORE: 0
EVER HAD A DRINK FIRST THING IN THE MORNING TO STEADY YOUR NERVES TO GET RID OF A HANGOVER: NO
DOES PATIENT WANT TO STOP DRINKING: NO
TOTAL SCORE: 0
HAVE PEOPLE ANNOYED YOU BY CRITICIZING YOUR DRINKING: NO
EVER FELT BAD OR GUILTY ABOUT YOUR DRINKING: NO
ON A TYPICAL DAY WHEN YOU DRINK ALCOHOL HOW MANY DRINKS DO YOU HAVE: 0
AVERAGE NUMBER OF DAYS PER WEEK YOU HAVE A DRINK CONTAINING ALCOHOL: 0
HOW MANY TIMES IN THE PAST YEAR HAVE YOU HAD 5 OR MORE DRINKS IN A DAY: 0

## 2024-09-10 ASSESSMENT — PATIENT HEALTH QUESTIONNAIRE - PHQ9
1. LITTLE INTEREST OR PLEASURE IN DOING THINGS: NOT AT ALL
2. FEELING DOWN, DEPRESSED, IRRITABLE, OR HOPELESS: NOT AT ALL
SUM OF ALL RESPONSES TO PHQ9 QUESTIONS 1 AND 2: 0

## 2024-09-10 ASSESSMENT — FIBROSIS 4 INDEX: FIB4 SCORE: 1.66

## 2024-09-10 ASSESSMENT — PAIN DESCRIPTION - PAIN TYPE
TYPE: ACUTE PAIN

## 2024-09-10 NOTE — ED NOTES
MD at bedside.    Reji Majano), Otolaryngology  [address] [place] [address]  Phone: [phone]  Fax: [phone]

## 2024-09-10 NOTE — PROGRESS NOTES
"    [facility] MEDICINE PROGRESS NOTE        Attending:   Dr. Darrell Roa    Resident:   Watson Zamora M.D.    PATIENT:   rBuce Wilson; [id number]; 1960    ID:   64 y.o. female admitted for pain control in the setting of stage IV pancreatic cancer and possible biliary stent obstruction    SUBJECTIVE:   No acute events overnight, patient initially had good control of pain after receiving 1 mg of IV Dilaudid in the ED.  Overnight she received just 0.5 mg of Dilaudid and was having nausea and severe pain.    Patient's  is in the room with her and provides most of the history.  He states he is an anesthesiologist and has experience with monitoring his wife's pain meds.  They are in the area for a \"low-dose\"chemo regimen in which she receives insulin to become hypoglycemic and then gets a small dose of gemcitabine prior to receiving glucose.  He believes that her current state is due to either swelling in the liver resulting from the chemo being effective and cellular death or the tumor has grown to the point where it has caused stent obstruction.  Their goal this admission is to improve her pain and determine what the cause of her increasing bilirubin is on MRCP.  He states that with the initial stent placement they had to have ERCP done by highly specialized GI doctors and so if MRCP shows obstruction, he is likely to want to discharge her to get care from Drs. that he researches and feels confident who could relieve the obstruction as it is likely to be a complicated case.    OBJECTIVE:  Vitals:    09/10/24 0432 09/10/24 0800 09/10/24 0808 09/10/24 1200   BP: 131/67 139/76     Pulse: 92 96     Resp: 18 17 16    Temp: 36.4 °C (97.5 °F) 36.5 °C (97.7 °F)     TempSrc: Temporal Temporal     SpO2: 99% 99%     Weight:    44.4 kg (97 lb 14.2 oz)   Height:         No intake or output data in the 24 hours ending 09/10/24 0558    PHYSICAL EXAM:   General: Ill-appearing female in no acute distress  HEENT: NC/AT. " "EOMI.   Cardiovascular: RRR without murmurs. Normal capillary refill   Respiratory: CTAB  Abdomen: Moderate tenderness with light palpation  EXT:  BOB, no edema  Skin: Jaundiced  Neuro: Non-focal    LABS:  Recent Labs     09/09/24  1123 09/09/24  1830 09/10/24  0240   WBC  --  5.1 4.6*   RBC 3.23* 2.86* 2.77*   HEMOGLOBIN 8.9* 7.9* 7.5*   HEMATOCRIT 26.9* 24.3* 23.8*   MCV 83.4 85.0 85.9   MCH 27.5* 27.6 27.1   RDW 24.5* 75.5* 77.6*   PLATELETCT 399* 351 335   MPV 7.6 9.2 9.9   NEUTSPOLYS  --  72.20* 60.30   LYMPHOCYTES  --  13.90* 16.40*   MONOCYTES  --  11.30 18.90*   EOSINOPHILS  --  0.80 1.30   BASOPHILS  --  0.90 1.10   RBCMORPHOLO  --  Present  --      Recent Labs     09/09/24  1830 09/10/24  0240   SODIUM 134* 136   POTASSIUM 4.2 4.2   CHLORIDE 98 101   CO2 22 21   BUN 13 11   CREATININE 0.20* 0.29*   CALCIUM 9.5 9.2   MAGNESIUM 1.9  --    PHOSPHORUS  --  3.7   ALBUMIN 3.4 3.3     Estimated GFR/CRCL = CrCl cannot be calculated (Unknown ideal weight.).  Recent Labs     09/09/24  1830 09/10/24  0240   GLUCOSE 106* 98     Recent Labs     09/09/24  1830 09/10/24  0240   ASTSGOT 113* 93*   ALTSGPT 120* 107*   TBILIRUBIN 7.3* 7.6*   IBILIRUBIN 1.2*  --    DBILIRUBIN 6.1*  --    ALKPHOSPHAT 2226* 2051*   GLOBULIN 3.5 3.3             No results for input(s): \"INR\", \"APTT\", \"FIBRINOGEN\" in the last 72 hours.    Invalid input(s): \"DIMER\"      IMAGING:  OUTSIDE IMAGES-CT ABDOMEN /PELVIS   Final Result      MR-ABDOMEN-WITH & W/O    (Results Pending)       MEDS:  Current Facility-Administered Medications   Medication Last Admin    HYDROmorphone (Dilaudid) tablet 1 mg      Or    HYDROmorphone (Dilaudid) tablet 2 mg 2 mg at 09/10/24 1027    Or    HYDROmorphone (Dilaudid) injection 1 mg 1 mg at 09/10/24 1145    Pharmacy Consult Request ...Pain Management Review 1 Each      ondansetron (Zofran) syringe/vial injection 4 mg 4 mg at 09/10/24 1146    NS infusion New Bag at 09/10/24 0815    senna-docusate (Pericolace Or Senokot S) " 8.6-50 MG per tablet 2 Tablet 2 Tablet at 09/09/24 2102    And    polyethylene glycol/lytes (Miralax) Packet 1 Packet      Pharmacy Consult Request ...Pain Management Review 1 Each         ASSESSMENT/PLAN:    Abdominal pain  Assessment & Plan  Biliary stent obstruction suspected in setting of metastatic pancreatic cancer.  Bilirubin increased from 2 up to 8 over the past couple of weeks with associated jaundice and worsening abdominal pain.  Biliary stent on CT abdomen appears properly placed.  Bilirubin and LFTs downtrending, though pain is still uncontrolled with 0.5 mg of Dilaudid.  Planned MRCP for determination of cause of pain.      Plan:  - Hydromorphone 1 to 2 mg every 3 hours as needed  -Zofran 4 mg every 3 hours as needed for nausea  -Follow-up GI recs after MRCP    -Continue to monitor bilirubin levels    Pancreatic cancer metastasized to liver (HCC)- (present on admission)  Assessment & Plan  Metastatic pancreatic cancer with lesions to the liver, lungs and possible retroperitoneal involvement per CT abdomen on admission.  Patient is s/p biliary stent placement with ongoing chemotherapy treatment.  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  No evidence of GI bleed on current presentation.    Plan:  - GI consulted for possible biliary drainage  - Pain management  - MIVF       Met with patient after MRCP completed which showed tumor obstructing proximal end of stent.  Discussed that one potential temporizing measure to help relieve symptoms could be placement of a percutaneous biliary drain which may allow for some pain relief prior to getting seen by other specialist.  However we are not the ones who would be placing the drain and so would defer that decision to the GI team who has yet to see her to discuss these results.  Patient questions were answered to the best of our ability with some deferred to GI specialist at this time.  Patient  expressed that her pain has still not been well-controlled, increased her Dilaudid pain regimen to 4 mg p.o. for severe pain, followed 1 hour later by 1 mg IV Dilaudid if pain still uncontrolled.  Patient left in room to consider her options with her .    Patient expressed frustration at current placement in the hospital and being loudly awoken throughout the night.  Transfer orders placed for her to be moved to [facility] as she is a cancer patient.        Core Measures  IV Fluids: 100 cc/h  Antbx: None  DVT ppx:  SCDs  Code status: Full  Dispo: inpatient    Watson Zamora MD  [facility]  PGY-1

## 2024-09-10 NOTE — ED NOTES
Port accessed in left chest. Blood draw completed. Medicated per MAR. Patient tolerated well. Sterile technique and placement per policy. Family at bedside. Call light within reach.

## 2024-09-10 NOTE — CARE PLAN
The patient is Stable - Low risk of patient condition declining or worsening    Shift Goals  Clinical Goals: Pain control, NPO  Patient Goals: pain control,sleep  Family Goals: updates    Problem: Pain - Standard  Goal: Alleviation of pain or a reduction in pain to the patient’s comfort goal  Outcome: Progressing       Progress made toward(s) clinical / shift goals:  Patient is alert and oriented, aditi met at this time. Medicated for pain  and nausea w/effect. Pt is NPO. Hourly rounding in place. Call light within reach.     Patient is not progressing towards the following goals:

## 2024-09-10 NOTE — ASSESSMENT & PLAN NOTE
Metastatic pancreatic cancer with lesions to the liver, lungs and possible retroperitoneal involvement per CT abdomen on admission.  Patient is s/p biliary stent placement with ongoing chemotherapy treatment.  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  MRCP with findings of enhancing soft tissue at proximal aspect of biliary stent concerning for tumor occlusion.  Patient underwent ERCP on 9/11.  After speaking with IR and GI on 9/13, concerned that tumor is still obstructing stent as his bilirubin is continuing to increase.  Also possible this could be reactionary in the postop setting.  If obstructed, consideration would have to be made for drain placement however given the location of the tumor on imaging it is likely that multiple drains would have to be placed in order to alleviate obstruction.  Repeat MRCP ordered on 9/13 for up-to-date imaging prior to shared decision making regarding drain placement versus attempted repeat ERCP. bilirubin continues to trend upward.    Plan:  - GI planning to visit with patient at 4 PM and discussed technical aspects of procedure.  Will follow-up with GI and family after this discussion for final planning.  -Add general diet to patient as no procedure planned for today 9/16  - Pain management, cont to titrate to pain control  - Stop IV fluids  - Patient with improved eating after addition of home meds.,  Continue Protonix and Creon.

## 2024-09-10 NOTE — DISCHARGE SUMMARY
Admit Date:  9/9/2024       Discharge Date:   09/16/2024     Service:   [facility] Family Medicine Inpatient Team  Attending Physician(s):   Dr. Darrell Roa, Dr. Jarett Gomez, Dr. Mcgraw, Dr. Suh     Senior Resident(s):   Dr. Ila Pacheco, Dr. Danial Galeana  [place] Resident(s):   Dr. Zamora    Primary Diagnosis:   Abdominal Pain    Secondary Diagnoses:                Stage IV pancreatic cancer with likely liver, lung, retroperitoneal LN mets.     HPI (Per Dr. Carrillo's Admission H&P):      Bruce Wilson is a 64 y.o. female with significant past medical history stage IV pancreatic cancer s/p biliary stent placement, who presented with approximately 4-day history of worsening abdominal pain with associated jaundice.  Patient reports she was visiting [place], originally from [place], for purposes of infusion treatments in [place] for her pancreatic cancer.  Patient receives a low dose of gemcitabine after getting insulin to drop her glucose followed by glucose administration.  Unfortunately, patient was only able to receive 1 treatment when she began to feel unwell and was scheduled to have a second treatment today however, due to pain, she was unable to attend.  The patient's  keeps a detailed log of her labs which demonstrated an uptrending total bilirubin over the past couple of weeks and notable for an increase from 2 up to 8.  Per patient, her last EGD was in early August which confirmed integrity and placement of stent.  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  Patient denies any hematemesis during current presentation however, does endorse at least 1 episode of nonbilious emesis.  The patient normally is able to manage her pain with p.o. morphine however, pain was refractory to morphine and this is what ultimately brought her into [facility].  After being evaluated there, it was determined that she needed to be transferred to higher level of care and  was transferred to [facility].        ER Course:  In the ED, vital signs stable.  Labs: CBC notable for hemoglobin 7.9 otherwise largely unremarkable.  CHEM panel notable for sodium 134, , , alkaline phosphatase 2226, total bilirubin 7.3, direct bilirubin 6.1.  Lipase 6.  Imaging: CT abdomen/pelvis with IV contrast notable for common bile duct stent in appropriate position, mild intrahepatic biliary ductal dilation with pneumobilia, infiltrative disease of liver suggestive of metastasis, lung base nodule suggestive of metastasis and mesenteric root and retroperitoneal lymphadenopathy suggestive of metastatic disease.  Patient was given 1 mg IV Dilaudid and a 1 L NS bolus.      Hospital Summary (Brief Narrative):         Bruce Wilson was admitted to [facility] on 9/9/2024 for management of abdominal pain in setting of stage IV pancreatic cancer with established biliary stent.  Patient's pain was managed with IV and p.o. Dilaudid.  MRCP was done in order to investigate possible causes of pain including concern for biliary stent obstruction versus upstream obstruction.  Revealed: Likely proximal stent obstruction from tumor occlusion versus inflammation.  Multiple liver lesions primarily in right lobe.  ERCP was attempted in order to pass the stent beyond obstruction.  Patient's bilirubin and transaminases continue to elevate after procedure.  Repeat MRCP showed continued tumor at proximal end of stent, still concern for occlusion.  Interventional radiology did not see indication for placing percutaneous biliary drains.  Further discussion was had with GI team and surgeons who ultimately decided stenting would likely provide limited benefit given inability to reach smaller ducts, exposing the patient to procedural risks with minimal gain.  State patient might benefit from evaluation at a tertiary hospital such as [facility].    Patient initially had issues with constipation and abdominal  pain.  This was resolved with 4 to 6 mg of Dilaudid oral every 3 hours as needed.  Her bowel regimen included MiraLAX, senna, milk of mag and lactulose.    Patient complained of bilateral lower extremity edema on the last few days of admission.  At the time of discharge, she states her legs feel tight, but does not have any posterior calf pain, no shortness of breath, or other acute clinical change.  We discussed the differential diagnosis, including most likely decreased intravascular osmotic pressure from poor liver function, less likely heart failure versus DVT.  The patient did have SCDs alone for DVT prophylaxis throughout the admission.  Her Wells score for DVT is 0, receiving 1 point for active cancer, 1 point for entire leg swollen, and -2 points for alternative diagnosis as likely or more likely.  We discussed the options of staying for further workup of DVT versus discharge home with return precautions for new or worsening symptoms.  She elects to go home at this time, stating she will return if she has worsening symptoms.  The patient was provided with instruction for elevation of the extremities, ZEINAB hose for symptomatic relief.      Patient will follow-up outpatient for further evaluation and management of her pancreatic cancer with liver metastasis.      Consultants:      GI    Procedures:        MRCP    Labs:  Results for orders placed or performed during the hospital encounter of 09/09/24   CBC with Differential   Result Value Ref Range    WBC 5.1 4.8 - 10.8 K/uL    RBC 2.86 (L) 4.20 - 5.40 M/uL    Hemoglobin 7.9 (L) 12.0 - 16.0 g/dL    Hematocrit 24.3 (L) 37.0 - 47.0 %    MCV 85.0 81.4 - 97.8 fL    MCH 27.6 27.0 - 33.0 pg    MCHC 32.5 32.2 - 35.5 g/dL    RDW 75.5 (H) 35.9 - 50.0 fL    Platelet Count 351 164 - 446 K/uL    MPV 9.2 9.0 - 12.9 fL    Neutrophils-Polys 72.20 (H) 44.00 - 72.00 %    Lymphocytes 13.90 (L) 22.00 - 41.00 %    Monocytes 11.30 0.00 - 13.40 %    Eosinophils 0.80 0.00 - 6.90 %     Basophils 0.90 0.00 - 1.80 %    Nucleated RBC 0.40 (H) 0.00 - 0.20 /100 WBC    Neutrophils (Absolute) 3.73 1.82 - 7.42 K/uL    Lymphs (Absolute) 0.71 (L) 1.00 - 4.80 K/uL    Monos (Absolute) 0.58 0.00 - 0.85 K/uL    Eos (Absolute) 0.04 0.00 - 0.51 K/uL    Baso (Absolute) 0.05 0.00 - 0.12 K/uL    NRBC (Absolute) 0.02 K/uL    Hypochromia 2+ (A)     Anisocytosis 2+ (A)     Macrocytosis 2+ (A)    Complete Metabolic Panel   Result Value Ref Range    Sodium 134 (L) 135 - 145 mmol/L    Potassium 4.2 3.6 - 5.5 mmol/L    Chloride 98 96 - 112 mmol/L    Co2 22 20 - 33 mmol/L    Anion Gap 14.0 7.0 - 16.0    Glucose 106 (H) 65 - 99 mg/dL    Bun 13 8 - 22 mg/dL    Creatinine 0.20 (L) 0.50 - 1.40 mg/dL    Calcium 9.5 8.5 - 10.5 mg/dL    Correct Calcium 10.0 8.5 - 10.5 mg/dL    AST(SGOT) 113 (H) 12 - 45 U/L    ALT(SGPT) 120 (H) 2 - 50 U/L    Alkaline Phosphatase 2226 (H) 30 - 99 U/L    Total Bilirubin 7.3 (H) 0.1 - 1.5 mg/dL    Albumin 3.4 3.2 - 4.9 g/dL    Total Protein 6.9 6.0 - 8.2 g/dL    Globulin 3.5 1.9 - 3.5 g/dL    A-G Ratio 1.0 g/dL   Lipase   Result Value Ref Range    Lipase 6 (L) 11 - 82 U/L   Urinalysis    Specimen: Urine   Result Value Ref Range    Color DK Yellow     Character Clear     Specific Gravity 1.012 <1.035    Ph 6.5 5.0 - 8.0    Glucose Negative Negative mg/dL    Ketones Negative Negative mg/dL    Protein Negative Negative mg/dL    Bilirubin Negative Negative    Urobilinogen, Urine 0.2 Negative    Nitrite Negative Negative    Leukocyte Esterase Negative Negative    Occult Blood Negative Negative    Micro Urine Req see below    BILIRUBIN DIRECT   Result Value Ref Range    Direct Bilirubin 6.1 (H) 0.1 - 0.5 mg/dL   BILIRUBIN INDIRECT   Result Value Ref Range    Indirect Bilirubin 1.2 (H) 0.0 - 1.0 mg/dL   DIFFERENTIAL MANUAL   Result Value Ref Range    Bands-Stabs 0.90 0.00 - 10.00 %    Manual Diff Status PERFORMED    PERIPHERAL SMEAR REVIEW   Result Value Ref Range    Peripheral Smear Review see below     PLATELET ESTIMATE   Result Value Ref Range    Plt Estimation Normal    MORPHOLOGY   Result Value Ref Range    RBC Morphology Present     Poikilocytosis 1+     Target Cells 1+    MAGNESIUM   Result Value Ref Range    Magnesium 1.9 1.5 - 2.5 mg/dL   ESTIMATED GFR   Result Value Ref Range    GFR (CKD-EPI) 132 >60 mL/min/1.73 m 2   Comp Metabolic Panel (CMP)   Result Value Ref Range    Sodium 136 135 - 145 mmol/L    Potassium 4.2 3.6 - 5.5 mmol/L    Chloride 101 96 - 112 mmol/L    Co2 21 20 - 33 mmol/L    Anion Gap 14.0 7.0 - 16.0    Glucose 98 65 - 99 mg/dL    Bun 11 8 - 22 mg/dL    Creatinine 0.29 (L) 0.50 - 1.40 mg/dL    Calcium 9.2 8.5 - 10.5 mg/dL    Correct Calcium 9.8 8.5 - 10.5 mg/dL    AST(SGOT) 93 (H) 12 - 45 U/L    ALT(SGPT) 107 (H) 2 - 50 U/L    Alkaline Phosphatase 2051 (H) 30 - 99 U/L    Total Bilirubin 7.6 (H) 0.1 - 1.5 mg/dL    Albumin 3.3 3.2 - 4.9 g/dL    Total Protein 6.6 6.0 - 8.2 g/dL    Globulin 3.3 1.9 - 3.5 g/dL    A-G Ratio 1.0 g/dL   CBC with Differential   Result Value Ref Range    WBC 4.6 (L) 4.8 - 10.8 K/uL    RBC 2.77 (L) 4.20 - 5.40 M/uL    Hemoglobin 7.5 (L) 12.0 - 16.0 g/dL    Hematocrit 23.8 (L) 37.0 - 47.0 %    MCV 85.9 81.4 - 97.8 fL    MCH 27.1 27.0 - 33.0 pg    MCHC 31.5 (L) 32.2 - 35.5 g/dL    RDW 77.6 (H) 35.9 - 50.0 fL    Platelet Count 335 164 - 446 K/uL    MPV 9.9 9.0 - 12.9 fL    Neutrophils-Polys 60.30 44.00 - 72.00 %    Lymphocytes 16.40 (L) 22.00 - 41.00 %    Monocytes 18.90 (H) 0.00 - 13.40 %    Eosinophils 1.30 0.00 - 6.90 %    Basophils 1.10 0.00 - 1.80 %    Immature Granulocytes 2.00 (H) 0.00 - 0.90 %    Nucleated RBC 0.40 (H) 0.00 - 0.20 /100 WBC    Neutrophils (Absolute) 2.75 1.82 - 7.42 K/uL    Lymphs (Absolute) 0.75 (L) 1.00 - 4.80 K/uL    Monos (Absolute) 0.86 (H) 0.00 - 0.85 K/uL    Eos (Absolute) 0.06 0.00 - 0.51 K/uL    Baso (Absolute) 0.05 0.00 - 0.12 K/uL    Immature Granulocytes (abs) 0.09 0.00 - 0.11 K/uL    NRBC (Absolute) 0.02 K/uL   ESTIMATED GFR    Result Value Ref Range    GFR (CKD-EPI) 120 >60 mL/min/1.73 m 2   PHOSPHORUS   Result Value Ref Range    Phosphorus 3.7 2.5 - 4.5 mg/dL   Comp Metabolic Panel   Result Value Ref Range    Sodium 133 (L) 135 - 145 mmol/L    Potassium 4.0 3.6 - 5.5 mmol/L    Chloride 100 96 - 112 mmol/L    Co2 24 20 - 33 mmol/L    Anion Gap 9.0 7.0 - 16.0    Glucose 106 (H) 65 - 99 mg/dL    Bun 8 8 - 22 mg/dL    Creatinine 0.44 (L) 0.50 - 1.40 mg/dL    Calcium 8.6 8.5 - 10.5 mg/dL    Correct Calcium 9.5 8.5 - 10.5 mg/dL    AST(SGOT) 91 (H) 12 - 45 U/L    ALT(SGPT) 93 (H) 2 - 50 U/L    Alkaline Phosphatase 1925 (H) 30 - 99 U/L    Total Bilirubin 7.0 (H) 0.1 - 1.5 mg/dL    Albumin 2.9 (L) 3.2 - 4.9 g/dL    Total Protein 5.9 (L) 6.0 - 8.2 g/dL    Globulin 3.0 1.9 - 3.5 g/dL    A-G Ratio 1.0 g/dL   CBC WITH DIFFERENTIAL   Result Value Ref Range    WBC 4.7 (L) 4.8 - 10.8 K/uL    RBC 2.35 (L) 4.20 - 5.40 M/uL    Hemoglobin 6.6 (L) 12.0 - 16.0 g/dL    Hematocrit 20.1 (L) 37.0 - 47.0 %    MCV 85.5 81.4 - 97.8 fL    MCH 28.1 27.0 - 33.0 pg    MCHC 32.8 32.2 - 35.5 g/dL    RDW 80.6 (H) 35.9 - 50.0 fL    Platelet Count 241 164 - 446 K/uL    MPV 9.2 9.0 - 12.9 fL    Neutrophils-Polys 65.70 44.00 - 72.00 %    Lymphocytes 12.10 (L) 22.00 - 41.00 %    Monocytes 17.80 (H) 0.00 - 13.40 %    Eosinophils 1.70 0.00 - 6.90 %    Basophils 0.60 0.00 - 1.80 %    Immature Granulocytes 2.10 (H) 0.00 - 0.90 %    Nucleated RBC 0.00 0.00 - 0.20 /100 WBC    Neutrophils (Absolute) 3.09 1.82 - 7.42 K/uL    Lymphs (Absolute) 0.57 (L) 1.00 - 4.80 K/uL    Monos (Absolute) 0.84 0.00 - 0.85 K/uL    Eos (Absolute) 0.08 0.00 - 0.51 K/uL    Baso (Absolute) 0.03 0.00 - 0.12 K/uL    Immature Granulocytes (abs) 0.10 0.00 - 0.11 K/uL    NRBC (Absolute) 0.00 K/uL   COD - Adult (Type and Screen)   Result Value Ref Range    ABO Grouping Only A     Rh Grouping Only POS     Antibody Screen-Cod NEG     Component R       Red Cells, LR       [id number]   transfused   09/11/24 09:09     Product Type R99     Dispense Status transfused     Unit Number (Barcoded) [id number]     Product Code (Barcoded) [id number]     Blood Type (Barcoded) 6200    ABO Rh Confirm   Result Value Ref Range    ABO Rh Confirm A POS    ESTIMATED GFR   Result Value Ref Range    GFR (CKD-EPI) 109 >60 mL/min/1.73 m 2   IRON/TOTAL IRON BIND   Result Value Ref Range    Iron 40 40 - 170 ug/dL    Total Iron Binding 274 250 - 450 ug/dL    Unsat Iron Binding 234 110 - 370 ug/dL    % Saturation 15 15 - 55 %   FERRITIN   Result Value Ref Range    Ferritin 568.0 (H) 10.0 - 291.0 ng/mL   HEMOGLOBIN AND HEMATOCRIT   Result Value Ref Range    Hemoglobin 8.2 (L) 12.0 - 16.0 g/dL    Hematocrit 24.9 (L) 37.0 - 47.0 %   CBC WITH DIFFERENTIAL   Result Value Ref Range    WBC 4.8 4.8 - 10.8 K/uL    RBC 2.72 (L) 4.20 - 5.40 M/uL    Hemoglobin 7.7 (L) 12.0 - 16.0 g/dL    Hematocrit 23.2 (L) 37.0 - 47.0 %    MCV 85.3 81.4 - 97.8 fL    MCH 28.3 27.0 - 33.0 pg    MCHC 33.2 32.2 - 35.5 g/dL    RDW 74.4 (H) 35.9 - 50.0 fL    Platelet Count 222 164 - 446 K/uL    MPV 9.3 9.0 - 12.9 fL    Neutrophils-Polys 65.50 44.00 - 72.00 %    Lymphocytes 10.50 (L) 22.00 - 41.00 %    Monocytes 17.70 (H) 0.00 - 13.40 %    Eosinophils 3.60 0.00 - 6.90 %    Basophils 0.40 0.00 - 1.80 %    Immature Granulocytes 2.30 (H) 0.00 - 0.90 %    Nucleated RBC 0.00 0.00 - 0.20 /100 WBC    Neutrophils (Absolute) 3.11 1.82 - 7.42 K/uL    Lymphs (Absolute) 0.50 (L) 1.00 - 4.80 K/uL    Monos (Absolute) 0.84 0.00 - 0.85 K/uL    Eos (Absolute) 0.17 0.00 - 0.51 K/uL    Baso (Absolute) 0.02 0.00 - 0.12 K/uL    Immature Granulocytes (abs) 0.11 0.00 - 0.11 K/uL    NRBC (Absolute) 0.00 K/uL   Basic Metabolic Panel   Result Value Ref Range    Sodium 135 135 - 145 mmol/L    Potassium 3.9 3.6 - 5.5 mmol/L    Chloride 102 96 - 112 mmol/L    Co2 22 20 - 33 mmol/L    Glucose 87 65 - 99 mg/dL    Bun 5 (L) 8 - 22 mg/dL    Creatinine 0.19 (L) 0.50 - 1.40 mg/dL    Calcium 8.7 8.5 - 10.5 mg/dL     Anion Gap 11.0 7.0 - 16.0   ESTIMATED GFR   Result Value Ref Range    GFR (CKD-EPI) 133 >60 mL/min/1.73 m 2   HEPATIC FUNCTION PANEL   Result Value Ref Range    Alkaline Phosphatase 2041 (H) 30 - 99 U/L    AST(SGOT) 97 (H) 12 - 45 U/L    ALT(SGPT) 90 (H) 2 - 50 U/L    Total Bilirubin 10.0 (H) 0.1 - 1.5 mg/dL    Direct Bilirubin 8.1 (H) 0.1 - 0.5 mg/dL    Indirect Bilirubin 1.9 (H) 0.0 - 1.0 mg/dL    Albumin 2.8 (L) 3.2 - 4.9 g/dL    Total Protein 5.7 (L) 6.0 - 8.2 g/dL   Comp Metabolic Panel   Result Value Ref Range    Sodium 136 135 - 145 mmol/L    Potassium 3.5 (L) 3.6 - 5.5 mmol/L    Chloride 100 96 - 112 mmol/L    Co2 20 20 - 33 mmol/L    Anion Gap 16.0 7.0 - 16.0    Glucose 116 (H) 65 - 99 mg/dL    Bun 8 8 - 22 mg/dL    Creatinine <0.17 (L) 0.50 - 1.40 mg/dL    Calcium 8.5 8.5 - 10.5 mg/dL    Correct Calcium 9.4 8.5 - 10.5 mg/dL    AST(SGOT) 95 (H) 12 - 45 U/L    ALT(SGPT) 92 (H) 2 - 50 U/L    Alkaline Phosphatase 2194 (H) 30 - 99 U/L    Total Bilirubin 13.7 (H) 0.1 - 1.5 mg/dL    Albumin 2.9 (L) 3.2 - 4.9 g/dL    Total Protein 5.7 (L) 6.0 - 8.2 g/dL    Globulin 2.8 1.9 - 3.5 g/dL    A-G Ratio 1.0 g/dL   CBC WITH DIFFERENTIAL   Result Value Ref Range    WBC 8.8 4.8 - 10.8 K/uL    RBC 2.73 (L) 4.20 - 5.40 M/uL    Hemoglobin 7.8 (L) 12.0 - 16.0 g/dL    Hematocrit 23.6 (L) 37.0 - 47.0 %    MCV 86.4 81.4 - 97.8 fL    MCH 28.6 27.0 - 33.0 pg    MCHC 33.1 32.2 - 35.5 g/dL    RDW 76.8 (H) 35.9 - 50.0 fL    Platelet Count 223 164 - 446 K/uL    MPV 9.2 9.0 - 12.9 fL    Neutrophils-Polys 89.00 (H) 44.00 - 72.00 %    Lymphocytes 3.40 (L) 22.00 - 41.00 %    Monocytes 6.80 0.00 - 13.40 %    Eosinophils 0.00 0.00 - 6.90 %    Basophils 0.00 0.00 - 1.80 %    Nucleated RBC 0.00 0.00 - 0.20 /100 WBC    Neutrophils (Absolute) 7.90 (H) 1.82 - 7.42 K/uL    Lymphs (Absolute) 0.30 (L) 1.00 - 4.80 K/uL    Monos (Absolute) 0.60 0.00 - 0.85 K/uL    Eos (Absolute) 0.00 0.00 - 0.51 K/uL    Baso (Absolute) 0.00 0.00 - 0.12 K/uL    NRBC  (Absolute) 0.00 K/uL    Anisocytosis 1+     Macrocytosis 1+    DIFFERENTIAL MANUAL   Result Value Ref Range    Bands-Stabs 0.80 0.00 - 10.00 %    Manual Diff Status PERFORMED    PERIPHERAL SMEAR REVIEW   Result Value Ref Range    Peripheral Smear Review see below    PLATELET ESTIMATE   Result Value Ref Range    Plt Estimation Normal    MORPHOLOGY   Result Value Ref Range    RBC Morphology Present     Poikilocytosis 1+     Target Cells 2+     Toxic Vacuoles Few    ESTIMATED GFR   Result Value Ref Range    GFR (CKD-EPI) 137 >60 mL/min/1.73 m 2   Comp Metabolic Panel   Result Value Ref Range    Sodium 134 (L) 135 - 145 mmol/L    Potassium 3.7 3.6 - 5.5 mmol/L    Chloride 102 96 - 112 mmol/L    Co2 21 20 - 33 mmol/L    Anion Gap 11.0 7.0 - 16.0    Glucose 133 (H) 65 - 99 mg/dL    Bun 12 8 - 22 mg/dL    Creatinine <0.17 (L) 0.50 - 1.40 mg/dL    Calcium 8.6 8.5 - 10.5 mg/dL    Correct Calcium 9.6 8.5 - 10.5 mg/dL    AST(SGOT) 56 (H) 12 - 45 U/L    ALT(SGPT) 69 (H) 2 - 50 U/L    Alkaline Phosphatase 1794 (H) 30 - 99 U/L    Total Bilirubin 14.4 (H) 0.1 - 1.5 mg/dL    Albumin 2.7 (L) 3.2 - 4.9 g/dL    Total Protein 5.6 (L) 6.0 - 8.2 g/dL    Globulin 2.9 1.9 - 3.5 g/dL    A-G Ratio 0.9 g/dL   CBC WITH DIFFERENTIAL   Result Value Ref Range    WBC 7.3 4.8 - 10.8 K/uL    RBC 2.72 (L) 4.20 - 5.40 M/uL    Hemoglobin 7.8 (L) 12.0 - 16.0 g/dL    Hematocrit 24.2 (L) 37.0 - 47.0 %    MCV 89.0 81.4 - 97.8 fL    MCH 28.7 27.0 - 33.0 pg    MCHC 32.2 32.2 - 35.5 g/dL    RDW 76.9 (H) 35.9 - 50.0 fL    Platelet Count 237 164 - 446 K/uL    MPV 10.7 9.0 - 12.9 fL    Neutrophils-Polys 92.20 (H) 44.00 - 72.00 %    Lymphocytes 2.60 (L) 22.00 - 41.00 %    Monocytes 3.50 0.00 - 13.40 %    Eosinophils 0.80 0.00 - 6.90 %    Basophils 0.00 0.00 - 1.80 %    Nucleated RBC 0.00 0.00 - 0.20 /100 WBC    Neutrophils (Absolute) 6.80 1.82 - 7.42 K/uL    Lymphs (Absolute) 0.19 (L) 1.00 - 4.80 K/uL    Monos (Absolute) 0.26 0.00 - 0.85 K/uL    Eos (Absolute)  0.06 0.00 - 0.51 K/uL    Baso (Absolute) 0.00 0.00 - 0.12 K/uL    NRBC (Absolute) 0.00 K/uL    Anisocytosis 1+     Macrocytosis 1+    Prothrombin Time   Result Value Ref Range    PT 16.1 (H) 12.0 - 14.6 sec    INR 1.27 (H) 0.87 - 1.13   ESTIMATED GFR   Result Value Ref Range    GFR (CKD-EPI) 137 >60 mL/min/1.73 m 2   DIFFERENTIAL MANUAL   Result Value Ref Range    Bands-Stabs 0.90 0.00 - 10.00 %    Manual Diff Status PERFORMED    PERIPHERAL SMEAR REVIEW   Result Value Ref Range    Peripheral Smear Review see below    PLATELET ESTIMATE   Result Value Ref Range    Plt Estimation Normal    MORPHOLOGY   Result Value Ref Range    RBC Morphology Present     Poikilocytosis 1+     Target Cells 1+     Toxic Vacuoles Few    CBC WITH DIFFERENTIAL   Result Value Ref Range    WBC 5.5 4.8 - 10.8 K/uL    RBC 2.70 (L) 4.20 - 5.40 M/uL    Hemoglobin 7.8 (L) 12.0 - 16.0 g/dL    Hematocrit 23.7 (L) 37.0 - 47.0 %    MCV 87.8 81.4 - 97.8 fL    MCH 28.9 27.0 - 33.0 pg    MCHC 32.9 32.2 - 35.5 g/dL    RDW 74.6 (H) 35.9 - 50.0 fL    Platelet Count 229 164 - 446 K/uL    MPV 10.3 9.0 - 12.9 fL    Neutrophils-Polys 75.90 (H) 44.00 - 72.00 %    Lymphocytes 7.30 (L) 22.00 - 41.00 %    Monocytes 14.40 (H) 0.00 - 13.40 %    Eosinophils 1.10 0.00 - 6.90 %    Basophils 0.40 0.00 - 1.80 %    Immature Granulocytes 0.90 0.00 - 0.90 %    Nucleated RBC 0.00 0.00 - 0.20 /100 WBC    Neutrophils (Absolute) 4.15 1.82 - 7.42 K/uL    Lymphs (Absolute) 0.40 (L) 1.00 - 4.80 K/uL    Monos (Absolute) 0.79 0.00 - 0.85 K/uL    Eos (Absolute) 0.06 0.00 - 0.51 K/uL    Baso (Absolute) 0.02 0.00 - 0.12 K/uL    Immature Granulocytes (abs) 0.05 0.00 - 0.11 K/uL    NRBC (Absolute) 0.00 K/uL   Comp Metabolic Panel   Result Value Ref Range    Sodium 131 (L) 135 - 145 mmol/L    Potassium 3.7 3.6 - 5.5 mmol/L    Chloride 99 96 - 112 mmol/L    Co2 22 20 - 33 mmol/L    Anion Gap 10.0 7.0 - 16.0    Glucose 127 (H) 65 - 99 mg/dL    Bun 10 8 - 22 mg/dL    Creatinine 0.42 (L) 0.50  - 1.40 mg/dL    Calcium 8.2 (L) 8.5 - 10.5 mg/dL    Correct Calcium 9.2 8.5 - 10.5 mg/dL    AST(SGOT) 78 (H) 12 - 45 U/L    ALT(SGPT) 74 (H) 2 - 50 U/L    Alkaline Phosphatase 2021 (H) 30 - 99 U/L    Total Bilirubin 14.7 (H) 0.1 - 1.5 mg/dL    Albumin 2.7 (L) 3.2 - 4.9 g/dL    Total Protein 5.4 (L) 6.0 - 8.2 g/dL    Globulin 2.7 1.9 - 3.5 g/dL    A-G Ratio 1.0 g/dL   ESTIMATED GFR   Result Value Ref Range    GFR (CKD-EPI) 110 >60 mL/min/1.73 m 2   Comp Metabolic Panel   Result Value Ref Range    Sodium 131 (L) 135 - 145 mmol/L    Potassium 3.6 3.6 - 5.5 mmol/L    Chloride 97 96 - 112 mmol/L    Co2 22 20 - 33 mmol/L    Anion Gap 12.0 7.0 - 16.0    Glucose 110 (H) 65 - 99 mg/dL    Bun 7 (L) 8 - 22 mg/dL    Creatinine <0.17 (L) 0.50 - 1.40 mg/dL    Calcium 8.2 (L) 8.5 - 10.5 mg/dL    Correct Calcium 9.2 8.5 - 10.5 mg/dL    AST(SGOT) 86 (H) 12 - 45 U/L    ALT(SGPT) 68 (H) 2 - 50 U/L    Alkaline Phosphatase 1966 (H) 30 - 99 U/L    Total Bilirubin 14.4 (H) 0.1 - 1.5 mg/dL    Albumin 2.7 (L) 3.2 - 4.9 g/dL    Total Protein 5.2 (L) 6.0 - 8.2 g/dL    Globulin 2.5 1.9 - 3.5 g/dL    A-G Ratio 1.1 g/dL   CBC WITH DIFFERENTIAL   Result Value Ref Range    WBC 5.0 4.8 - 10.8 K/uL    RBC 2.88 (L) 4.20 - 5.40 M/uL    Hemoglobin 8.2 (L) 12.0 - 16.0 g/dL    Hematocrit 24.9 (L) 37.0 - 47.0 %    MCV 86.5 81.4 - 97.8 fL    MCH 28.5 27.0 - 33.0 pg    MCHC 32.9 32.2 - 35.5 g/dL    RDW 72.2 (H) 35.9 - 50.0 fL    Platelet Count 267 164 - 446 K/uL    MPV 10.5 9.0 - 12.9 fL    Neutrophils-Polys 70.70 44.00 - 72.00 %    Lymphocytes 10.60 (L) 22.00 - 41.00 %    Monocytes 15.50 (H) 0.00 - 13.40 %    Eosinophils 0.80 0.00 - 6.90 %    Basophils 0.60 0.00 - 1.80 %    Immature Granulocytes 1.80 (H) 0.00 - 0.90 %    Nucleated RBC 0.00 0.00 - 0.20 /100 WBC    Neutrophils (Absolute) 3.52 1.82 - 7.42 K/uL    Lymphs (Absolute) 0.53 (L) 1.00 - 4.80 K/uL    Monos (Absolute) 0.77 0.00 - 0.85 K/uL    Eos (Absolute) 0.04 0.00 - 0.51 K/uL    Baso (Absolute)  0.03 0.00 - 0.12 K/uL    Immature Granulocytes (abs) 0.09 0.00 - 0.11 K/uL    NRBC (Absolute) 0.00 K/uL   ESTIMATED GFR   Result Value Ref Range    GFR (CKD-EPI) 137 >60 mL/min/1.73 m 2   Prothrombin Time   Result Value Ref Range    PT 14.6 12.0 - 14.6 sec    INR 1.13 0.87 - 1.13       Imaging/ Testing:      [id number]-C/O   Final Result   Addendum (preliminary) 1 of 1   Case was discussed with Dr. Tyler Partida.      CBD stent displaced just below the expected confluence of the right and    left hepatic ducts.      There is paucity of ducts in the urban hepatis, upstream from the stent,    highly suggestive of a high-grade stricture or an obstructive    mass/metastatic lesion in this region.      Final      1.  Pneumobilia. Slight improvement in biliary dilatation compared to prior study.   2.  Air within the CBD stent.   3.  Pancreatic parenchyma is not well evaluated due to CBD stent and lack of IV contrast.   4.  Redemonstration of bilobar hepatic lesions concerning for hepatic metastases.   5.  No gallstones. Small amount of air within the gallbladder.   6.  Small volume ascites.   7.  Small right pleural effusion and associated atelectasis.      [id number] VIEW   Final Result      1. Nonobstructive bowel gas pattern.   2. Interval decrease in amount of stool throughout the colon noted on the prior CT.   3. Biliary stent.      [id number] STENT - TUBE   Final Result      Fluoroscopic guidance provided for ERCP. Total fluoroscopy time used was 1 minute and 5 seconds.      MR-ABDOMEN-WITH & W/O   Final Result      1.  Poor definition of pancreatic head mass.   2.  Artifact from biliary stent present.  Enhancing soft tissue at the proximal aspect concerning for tumor occlusion of the stent.   3.  Mild intrahepatic biliary dilation.   4.  Gas in the gallbladder, likely secondary to stent.   5.  Multiple liver lesions, primarily in the RIGHT lobe, most likely indicating metastasis.   6.  Small volume ascites.             OUTSIDE IMAGES-CT ABDOMEN /PELVIS   Final Result          Physical Exam:  Constitutional: Alert, no distress, well-groomed.  Skin: Warm, dry, good turgor, no rashes in visible areas.  Jaundiced.  Eye: Equal, round and reactive, conjunctiva clear, lids normal.  ENMT: Lips without lesions, good dentition, moist mucous membranes.  Neck: Trachea midline, no masses, no thyromegaly.  Respiratory: Unlabored respiratory effort, no cough.  MSK: Moves all extremities.  BLE 1+ pitting edema up to the knees, symmetric and nontender.  No posterior calf tenderness.  Neuro: Grossly normal  Psych: Normal affect and mood.    Discharge Medications:           Medication List        START taking these medications        Instructions   * HYDROmorphone 2 MG Tabs  Commonly known as: Dilaudid   Take 3 Tablets by mouth every 3 hours as needed for Severe Pain for up to 7 days.  Dose: 6 mg     * HYDROmorphone 4 MG Tabs  Commonly known as: Dilaudid   Take 1 Tablet by mouth every four hours as needed for Severe Pain for up to 7 days.  Dose: 4 mg     lactulose 10 GM/15ML Soln   Take 30 mL by mouth 2 times a day as needed (constipation) for up to 30 days.  Dose: 20 g     megestrol 40 MG/ML Susp  Start taking on: September 17, 2024  Commonly known as: Megace   Take 10 mL by mouth every day.  Dose: 400 mg     morphine ER 15 MG Tbcr tablet  Commonly known as: Ms Contin  Replaces: morphine 20 MG/ML Soln   Take 1 Tablet by mouth every 12 hours for 7 days. Indications: Cancer pain  Dose: 15 mg           * This list has 2 medication(s) that are the same as other medications prescribed for you. Read the directions carefully, and ask your doctor or other care provider to review them with you.                CHANGE how you take these medications        Instructions   Creon [id number] units Cpep  What changed: how much to take  Generic drug: Pancrelipase (Lip-Prot-Amyl)   Take 1-2 Capsules by mouth 3 times a day with meals. 2 caps with meals and 1 cap  with snacks  Dose: 1-2 Capsule            CONTINUE taking these medications        Instructions   azithromycin 250 MG Tabs  Commonly known as: Zithromax   Take 250 mg by mouth every day.  Dose: 250 mg     Ivermectin 3 MG Tabs   Take 16 mg by mouth in the morning, at noon, and at bedtime. **COMPOUNDED RX**  Dose: 16 mg     loratadine 10 MG Tabs  Commonly known as: Claritin   Take 10 mg by mouth every day.  Dose: 10 mg     lovastatin 10 MG tablet  Commonly known as: Mevacor   Take 10 mg by mouth every day.  Dose: 10 mg     Melatonin 10 MG Tabs   Take 60 mg by mouth at bedtime.  Dose: 60 mg     NexIUM 20 MG capsule  Generic drug: esomeprazole   Take 20 mg by mouth 2 times a day.  Dose: 20 mg     Restasis 0.05 % ophthalmic emulsion  Generic drug: cycloSPORINE   Administer 1 Drop into both eyes at bedtime.  Dose: 1 Drop     Systane Balance 0.6 % Soln  Generic drug: Propylene Glycol   Administer 1 Drop into affected eye(s) as needed.  Dose: 1 Drop            STOP taking these medications      morphine 20 MG/ML Soln  Commonly known as: Roxanol  Replaced by: morphine ER 15 MG Tbcr tablet                Disposition:   Home    Diet:   General    Activity:   Ad velvet    Instructions:      Please follow-up with PCP and outpatient GI.     The patient was instructed to return to the ER in the event of worsening symptoms. I have counseled the patient on the importance of compliance and the patient has agreed to proceed with all medical recommendations and follow up plan indicated above.   The patient understands that all medications come with benefits and risks. Risks may include permanent injury or death and these risks can be minimized with close reassessment and monitoring.        Please CC: Pcp Pt States None    Follow up appointment details :        No future appointments.    Follow up with Primary Care Physician within 7 days of discharge for further evaluation and care       The patient met the 2-midnight criteria for an  inpatient stay at the time of discharge.

## 2024-09-10 NOTE — ED NOTES
Phone report to [phone] GUILLERMINA Everett. Patient to be transported by patient transport. Patient updated on POC.

## 2024-09-10 NOTE — PROGRESS NOTES
4 Eyes Skin Assessment Completed by GUILLERMINA Everett and GUILLERMINA Manning.    Head WDL  Ears WDL  Nose WDL  Mouth WDL  Neck WDL  Breast/Chest WDL  Shoulder Blades WDL  Spine WDL  (R) Arm/Elbow/Hand WDL  (L) Arm/Elbow/Hand WDL  Abdomen WDL  Groin WDL  Scrotum/Coccyx/Buttocks WDL  (R) Leg WDL  (L) Leg WDL  (R) Heel/Foot/Toe WDL  (L) Heel/Foot/Toe WDL          Devices In Places Blood Pressure Cuff      Interventions In Place N/A    Possible Skin Injury No    Pictures Uploaded Into Epic Yes  Wound Consult Placed N/A  RN Wound Prevention Protocol Ordered No

## 2024-09-10 NOTE — ED TRIAGE NOTES
Bruce Wilson  64 y.o. female  Chief Complaint   Patient presents with    Abdominal Pain    Sent by [facility] from [facility] for biliary stent obstruction. Hx stage 4 pancreatic cancer. 4/10 RUQ abdominal pain after 1.5 mg Dilaudid at CT       Pt [facility] for above complaint.    PTA medications:    8 mg Zofran  1.5 mg Dilaudid  1000 mL NS    Pt is GCS 15, speaking in full sentences, follows commands and responds appropriately to questions. Resp are even and unlabored.     Abdominal pain protocol ordered.     Vitals:    09/09/24 1719   BP: (!) 145/66   Pulse: 86   Resp: 16   Temp: 37.2 °C (98.9 °F)   SpO2: 99%      No change  Recheck 2 weeks.

## 2024-09-10 NOTE — ASSESSMENT & PLAN NOTE
Biliary stent obstruction suspected in setting of metastatic pancreatic cancer.  Bilirubin increased from 2 up to 8 over the past couple of weeks with associated jaundice and worsening abdominal pain.  Repeat total bilirubin on admission 7.3 with direct bilirubin 6.1, suspect decrease to be dilutional.  Biliary stent appears to be intact and properly placed per CT abdomen.  Physical exam and labs reassuring at this time.  Patient remains high risk for encephalopathy.    Plan:  - GI consulted, will see patient in the morning  - N.p.o. at midnight  -Continue to monitor bilirubin levels

## 2024-09-10 NOTE — CARE PLAN
The patient is Stable - Low risk of patient condition declining or worsening        Problem: Pain - Standard  Goal: Alleviation of pain or a reduction in pain to the patient’s comfort goal  Outcome: Progressing     Problem: Fall Risk  Goal: Patient will remain free from falls  Outcome: Progressing     Shift Goals  Clinical Goals: MRI, Pain control throughout shift, IVF  Patient Goals: Pain control  Family Goals: updates    Progress made toward(s) clinical / shift goals:      Pt alert and oriented. VSS. On room air. Pt had an MRI this morning. C/O pain and nausea, medication given as ordered. OOB to bathroom and chair, tolerated, reinforced education on fall precaution, call light within reach.  at bedside, updated to POC.

## 2024-09-10 NOTE — H&P (VIEW-ONLY)
Date of Consultation:  9/10/2024    Patient: : Bruce Wilson  MRN: [id number]    Referring Physician:  Dr Roa     GI:Eugenio Osorio M.D.     Reason for Consultation: Jaundice    History of Present Illness:   64-year-old female transferred from [facility] for jaundice.  Patient has a history of pancreatic cancer.  She was in the area visiting with an oncologist who was going to pursue a nontraditional treatment for her pancreatic cancer as best I understand.  She was noticed to be jaundiced.  She went to [facility].  She was transferred here.  Patient has had a metal stent placed in her bile duct.  It is unclear whether this was covered or uncovered.  This was done in [place].  Her  may have records related to that.  Patient denies fevers.  ROS per admitting HPI otherwise.  No changes.    The patient had an EGD done on 8/12/2024.  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  Past Medical History:   Diagnosis Date    Pancreatic cancer (HCC) 10/2023       History reviewed. No pertinent surgical history.    History reviewed. No pertinent family history.    Social History     Socioeconomic History    Marital status:    Tobacco Use    Smoking status: Never    Smokeless tobacco: Never   Vaping Use    Vaping status: Never Used   Substance and Sexual Activity    Alcohol use: Never    Drug use: Never     Social Determinants of Health     Financial Resource Strain: Low Risk  (7/11/2024)    Received from [facility]    Overall Financial Resource Strain (CARDIA)     Difficulty of Paying Living Expenses: Not hard at all   Food Insecurity: No Food Insecurity (7/11/2024)    Received from [facility]    Hunger Vital Sign     Worried About Running Out of Food in the Last Year: Never true     Ran Out of Food in the Last Year: Never true   Transportation Needs: No Transportation Needs (7/11/2024)    Received from [facility]    PRAPARE - Transportation     Lack of Transportation  (Medical): No     Lack of Transportation (Non-Medical): No   Physical Activity: Inactive (7/11/2024)    Received from [facility]    Exercise Vital Sign     Days of Exercise per Week: 0 days     Minutes of Exercise per Session: 0 min   Stress: Stress Concern Present (7/11/2024)    Received from [facility]    [place] [place] [employer] - Occupational Stress Questionnaire     Feeling of Stress : Very much   Social Connections: Unknown (7/11/2024)    Received from [facility]    Social Connection and Isolation Panel [NHANES]     Marital Status:    Intimate Partner Violence: Not At Risk (9/10/2024)    Humiliation, Afraid, Rape, and Kick questionnaire     Fear of Current or Ex-Partner: No     Emotionally Abused: No     Physically Abused: No     Sexually Abused: No   Housing Stability: No Transportation Needs (7/1/2024)    [facility]    Yearly Questionnaire     Do you need any assistance with obtaining housing, meals, medication, transportation or medical equipment?: No       Current Meds (name, sig, last dose):     Current Facility-Administered Medications:     NS    senna-docusate **AND** polyethylene glycol/lytes    Pharmacy Consult Request    [DISCONTINUED] oxyCODONE immediate-release **OR** [DISCONTINUED] oxyCODONE immediate-release **OR** HYDROmorphone    ondansetron      ROS  10 systems reviewed and are negative unless otherwise noted in history of present illness.    Physical Exam:  Vitals:    09/09/24 1900 09/09/24 2000 09/09/24 2109 09/10/24 0432   BP: (!) 160/70 134/63 (!) 142/71 131/67   Pulse: 79 86 81 92   Resp: 19 20 18 18   Temp:   36.7 °C (98 °F) 36.4 °C (97.5 °F)   TempSrc:   Temporal Temporal   SpO2: 94% 96% 99% 99%   Weight:       Height:           Physical Exam  Constitutional:       General: She is not in acute distress.     Appearance: She is not toxic-appearing.   HENT:      Head: Normocephalic and  atraumatic.   Eyes:      General: Scleral icterus present.   Cardiovascular:      Rate and Rhythm: Normal rate and regular rhythm.   Pulmonary:      Effort: Pulmonary effort is normal.      Breath sounds: Normal breath sounds.   Abdominal:      Palpations: Abdomen is soft.   Skin:     General: Skin is warm and dry.      Coloration: Skin is jaundiced.   Neurological:      General: No focal deficit present.      Mental Status: She is alert and oriented to person, place, and time.   Psychiatric:         Behavior: Behavior normal.         Judgment: Judgment normal.           Labs:  Recent Labs     09/09/24  1830 09/10/24  0240   SODIUM 134* 136   POTASSIUM 4.2 4.2   CHLORIDE 98 101   CO2 22 21   BUN 13 11   CREATININE 0.20* 0.29*   MAGNESIUM 1.9  --    CALCIUM 9.5 9.2     Recent Labs     09/09/24  1830 09/10/24  0240   ALTSGPT 120* 107*   ASTSGOT 113* 93*   ALKPHOSPHAT 2226* 2051*   TBILIRUBIN 7.3* 7.6*   DBILIRUBIN 6.1*  --    LIPASE 6*  --    GLUCOSE 106* 98     Recent Labs     09/09/24  1830 09/10/24  0240   WBC 5.1 4.6*   NEUTSPOLYS 72.20* 60.30   LYMPHOCYTES 13.90* 16.40*   MONOCYTES 11.30 18.90*   EOSINOPHILS 0.80 1.30   BASOPHILS 0.90 1.10   ASTSGOT 113* 93*   ALTSGPT 120* 107*   ALKPHOSPHAT 2226* 2051*   TBILIRUBIN 7.3* 7.6*     Recent Labs     09/09/24  1123 09/09/24  1830 09/10/24  0240   RBC 3.23* 2.86* 2.77*   HEMOGLOBIN 8.9* 7.9* 7.5*   HEMATOCRIT 26.9* 24.3* 23.8*   PLATELETCT 399* 351 335     Recent Results (from the past 24 hour(s))   COMPLETE CBC & AUTO DIFF WBC    Collection Time: 09/09/24 11:23 AM   Result Value Ref Range    Leukocytes, Absolute 5.3 3.7 - 10.6 x10E3/uL    RBC 3.23 (L) 4.19 - 5.21 x10e6/uL    Hemoglobin 8.9 (L) 12.3 - 16.0 g/dL    Hematocrit 26.9 (L) 36.0 - 47.0 %    MCV 83.4 81.0 - 99.0 fL    MCH 27.5 (L) 28.0 - 33.8 pg    MCHC 33.0 (L) 33.1 - 36.5 g/dL    RDW 24.5 (H) 11.8 - 14.0 %    Platelet Count 399 (H) 146 - 390 x10E3/uL    MPV 7.6 6.4 - 10.2 fL   CBC with Differential     Collection Time: 09/09/24  6:30 PM   Result Value Ref Range    WBC 5.1 4.8 - 10.8 K/uL    RBC 2.86 (L) 4.20 - 5.40 M/uL    Hemoglobin 7.9 (L) 12.0 - 16.0 g/dL    Hematocrit 24.3 (L) 37.0 - 47.0 %    MCV 85.0 81.4 - 97.8 fL    MCH 27.6 27.0 - 33.0 pg    MCHC 32.5 32.2 - 35.5 g/dL    RDW 75.5 (H) 35.9 - 50.0 fL    Platelet Count 351 164 - 446 K/uL    MPV 9.2 9.0 - 12.9 fL    Neutrophils-Polys 72.20 (H) 44.00 - 72.00 %    Lymphocytes 13.90 (L) 22.00 - 41.00 %    Monocytes 11.30 0.00 - 13.40 %    Eosinophils 0.80 0.00 - 6.90 %    Basophils 0.90 0.00 - 1.80 %    Nucleated RBC 0.40 (H) 0.00 - 0.20 /100 WBC    Neutrophils (Absolute) 3.73 1.82 - 7.42 K/uL    Lymphs (Absolute) 0.71 (L) 1.00 - 4.80 K/uL    Monos (Absolute) 0.58 0.00 - 0.85 K/uL    Eos (Absolute) 0.04 0.00 - 0.51 K/uL    Baso (Absolute) 0.05 0.00 - 0.12 K/uL    NRBC (Absolute) 0.02 K/uL    Hypochromia 2+ (A)     Anisocytosis 2+ (A)     Macrocytosis 2+ (A)    Complete Metabolic Panel    Collection Time: 09/09/24  6:30 PM   Result Value Ref Range    Sodium 134 (L) 135 - 145 mmol/L    Potassium 4.2 3.6 - 5.5 mmol/L    Chloride 98 96 - 112 mmol/L    Co2 22 20 - 33 mmol/L    Anion Gap 14.0 7.0 - 16.0    Glucose 106 (H) 65 - 99 mg/dL    Bun 13 8 - 22 mg/dL    Creatinine 0.20 (L) 0.50 - 1.40 mg/dL    Calcium 9.5 8.5 - 10.5 mg/dL    Correct Calcium 10.0 8.5 - 10.5 mg/dL    AST(SGOT) 113 (H) 12 - 45 U/L    ALT(SGPT) 120 (H) 2 - 50 U/L    Alkaline Phosphatase 2226 (H) 30 - 99 U/L    Total Bilirubin 7.3 (H) 0.1 - 1.5 mg/dL    Albumin 3.4 3.2 - 4.9 g/dL    Total Protein 6.9 6.0 - 8.2 g/dL    Globulin 3.5 1.9 - 3.5 g/dL    A-G Ratio 1.0 g/dL   Lipase    Collection Time: 09/09/24  6:30 PM   Result Value Ref Range    Lipase 6 (L) 11 - 82 U/L   BILIRUBIN DIRECT    Collection Time: 09/09/24  6:30 PM   Result Value Ref Range    Direct Bilirubin 6.1 (H) 0.1 - 0.5 mg/dL   BILIRUBIN INDIRECT    Collection Time: 09/09/24  6:30 PM   Result Value Ref Range    Indirect Bilirubin 1.2  (H) 0.0 - 1.0 mg/dL   DIFFERENTIAL MANUAL    Collection Time: 09/09/24  6:30 PM   Result Value Ref Range    Bands-Stabs 0.90 0.00 - 10.00 %    Manual Diff Status PERFORMED    PERIPHERAL SMEAR REVIEW    Collection Time: 09/09/24  6:30 PM   Result Value Ref Range    Peripheral Smear Review see below    PLATELET ESTIMATE    Collection Time: 09/09/24  6:30 PM   Result Value Ref Range    Plt Estimation Normal    MORPHOLOGY    Collection Time: 09/09/24  6:30 PM   Result Value Ref Range    RBC Morphology Present     Poikilocytosis 1+     Target Cells 1+    MAGNESIUM    Collection Time: 09/09/24  6:30 PM   Result Value Ref Range    Magnesium 1.9 1.5 - 2.5 mg/dL   ESTIMATED GFR    Collection Time: 09/09/24  6:30 PM   Result Value Ref Range    GFR (CKD-EPI) 132 >60 mL/min/1.73 m 2   Urinalysis    Collection Time: 09/10/24  2:40 AM    Specimen: Urine   Result Value Ref Range    Color DK Yellow     Character Clear     Specific Gravity 1.012 <1.035    Ph 6.5 5.0 - 8.0    Glucose Negative Negative mg/dL    Ketones Negative Negative mg/dL    Protein Negative Negative mg/dL    Bilirubin Negative Negative    Urobilinogen, Urine 0.2 Negative    Nitrite Negative Negative    Leukocyte Esterase Negative Negative    Occult Blood Negative Negative    Micro Urine Req see below    Comp Metabolic Panel (CMP)    Collection Time: 09/10/24  2:40 AM   Result Value Ref Range    Sodium 136 135 - 145 mmol/L    Potassium 4.2 3.6 - 5.5 mmol/L    Chloride 101 96 - 112 mmol/L    Co2 21 20 - 33 mmol/L    Anion Gap 14.0 7.0 - 16.0    Glucose 98 65 - 99 mg/dL    Bun 11 8 - 22 mg/dL    Creatinine 0.29 (L) 0.50 - 1.40 mg/dL    Calcium 9.2 8.5 - 10.5 mg/dL    Correct Calcium 9.8 8.5 - 10.5 mg/dL    AST(SGOT) 93 (H) 12 - 45 U/L    ALT(SGPT) 107 (H) 2 - 50 U/L    Alkaline Phosphatase 2051 (H) 30 - 99 U/L    Total Bilirubin 7.6 (H) 0.1 - 1.5 mg/dL    Albumin 3.3 3.2 - 4.9 g/dL    Total Protein 6.6 6.0 - 8.2 g/dL    Globulin 3.3 1.9 - 3.5 g/dL    A-G Ratio 1.0  g/dL   CBC with Differential    Collection Time: 09/10/24  2:40 AM   Result Value Ref Range    WBC 4.6 (L) 4.8 - 10.8 K/uL    RBC 2.77 (L) 4.20 - 5.40 M/uL    Hemoglobin 7.5 (L) 12.0 - 16.0 g/dL    Hematocrit 23.8 (L) 37.0 - 47.0 %    MCV 85.9 81.4 - 97.8 fL    MCH 27.1 27.0 - 33.0 pg    MCHC 31.5 (L) 32.2 - 35.5 g/dL    RDW 77.6 (H) 35.9 - 50.0 fL    Platelet Count 335 164 - 446 K/uL    MPV 9.9 9.0 - 12.9 fL    Neutrophils-Polys 60.30 44.00 - 72.00 %    Lymphocytes 16.40 (L) 22.00 - 41.00 %    Monocytes 18.90 (H) 0.00 - 13.40 %    Eosinophils 1.30 0.00 - 6.90 %    Basophils 1.10 0.00 - 1.80 %    Immature Granulocytes 2.00 (H) 0.00 - 0.90 %    Nucleated RBC 0.40 (H) 0.00 - 0.20 /100 WBC    Neutrophils (Absolute) 2.75 1.82 - 7.42 K/uL    Lymphs (Absolute) 0.75 (L) 1.00 - 4.80 K/uL    Monos (Absolute) 0.86 (H) 0.00 - 0.85 K/uL    Eos (Absolute) 0.06 0.00 - 0.51 K/uL    Baso (Absolute) 0.05 0.00 - 0.12 K/uL    Immature Granulocytes (abs) 0.09 0.00 - 0.11 K/uL    NRBC (Absolute) 0.02 K/uL   ESTIMATED GFR    Collection Time: 09/10/24  2:40 AM   Result Value Ref Range    GFR (CKD-EPI) 120 >60 mL/min/1.73 m 2         Imaging:  No image results found.        MDM Data Review:  -Records reviewed and summarized in current documentation  -I personally reviewed and interpreted the laboratory results  -I personally reviewed the radiology images  -I have personally reviewed medications    Hospital Problem List:  Active Hospital Problems    Diagnosis     Biliary stent obstruction, initial encounter [T85.590A]     Pancreatic cancer metastasized to liver (HCC) [C25.9, C78.7]        Impressions:  64-year-old female having undergone treatment for pancreatic cancer.  Patient has had chemotherapy and radiation therapy.  She had an EGD done within the last 2 months which is present in the chart.  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  She has a biliary metal stent placed which was placed in [place].    On cross-sectional imaging it is  difficult to ascertain if there is tumor overgrowth or if the stent has slipped down below a stenosis.  We discussed different options to include ERCP versus MRCP.  Patient wishes to pursue MRCP prior to considering ERCP.  I am going to order that.  She can be on a clear liquid diet in the interim.    Recommendations:  MRCP to guide decisions regarding ERCP.  Further recommendations to follow based on study.  Order for MRCP placed.

## 2024-09-10 NOTE — CONSULTS
Date of Consultation:  9/10/2024    Patient: : Bruce Wilson  MRN: [id number]    Referring Physician:  Dr Roa     GI:Eugenio Osorio M.D.     Reason for Consultation: Jaundice    History of Present Illness:   64-year-old female transferred from [facility] for jaundice.  Patient has a history of pancreatic cancer.  She was in the area visiting with an oncologist who was going to pursue a nontraditional treatment for her pancreatic cancer as best I understand.  She was noticed to be jaundiced.  She went to [facility].  She was transferred here.  Patient has had a metal stent placed in her bile duct.  It is unclear whether this was covered or uncovered.  This was done in [place].  Her  may have records related to that.  Patient denies fevers.  ROS per admitting HPI otherwise.  No changes.    The patient had an EGD done on 8/12/2024.  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  Past Medical History:   Diagnosis Date    Pancreatic cancer (HCC) 10/2023       History reviewed. No pertinent surgical history.    History reviewed. No pertinent family history.    Social History     Socioeconomic History    Marital status:    Tobacco Use    Smoking status: Never    Smokeless tobacco: Never   Vaping Use    Vaping status: Never Used   Substance and Sexual Activity    Alcohol use: Never    Drug use: Never     Social Determinants of Health     Financial Resource Strain: Low Risk  (7/11/2024)    Received from [facility]    Overall Financial Resource Strain (CARDIA)     Difficulty of Paying Living Expenses: Not hard at all   Food Insecurity: No Food Insecurity (7/11/2024)    Received from [facility]    Hunger Vital Sign     Worried About Running Out of Food in the Last Year: Never true     Ran Out of Food in the Last Year: Never true   Transportation Needs: No Transportation Needs (7/11/2024)    Received from [facility]    PRAPARE - Transportation     Lack of Transportation  (Medical): No     Lack of Transportation (Non-Medical): No   Physical Activity: Inactive (7/11/2024)    Received from [facility]    Exercise Vital Sign     Days of Exercise per Week: 0 days     Minutes of Exercise per Session: 0 min   Stress: Stress Concern Present (7/11/2024)    Received from [facility]    [employer] - Occupational Stress Questionnaire     Feeling of Stress : Very much   Social Connections: Unknown (7/11/2024)    Received from [facility]    Social Connection and Isolation Panel [NHANES]     Marital Status:    Intimate Partner Violence: Not At Risk (9/10/2024)    Humiliation, Afraid, Rape, and Kick questionnaire     Fear of Current or Ex-Partner: No     Emotionally Abused: No     Physically Abused: No     Sexually Abused: No   Housing Stability: No Transportation Needs (7/1/2024)    [facility]    Yearly Questionnaire     Do you need any assistance with obtaining housing, meals, medication, transportation or medical equipment?: No       Current Meds (name, sig, last dose):     Current Facility-Administered Medications:     NS    senna-docusate **AND** polyethylene glycol/lytes    Pharmacy Consult Request    [DISCONTINUED] oxyCODONE immediate-release **OR** [DISCONTINUED] oxyCODONE immediate-release **OR** HYDROmorphone    ondansetron      ROS  10 systems reviewed and are negative unless otherwise noted in history of present illness.    Physical Exam:  Vitals:    09/09/24 1900 09/09/24 2000 09/09/24 2109 09/10/24 0432   BP: (!) 160/70 134/63 (!) 142/71 131/67   Pulse: 79 86 81 92   Resp: 19 20 18 18   Temp:   36.7 °C (98 °F) 36.4 °C (97.5 °F)   TempSrc:   Temporal Temporal   SpO2: 94% 96% 99% 99%   Weight:       Height:           Physical Exam  Constitutional:       General: She is not in acute distress.     Appearance: She is not toxic-appearing.   HENT:      Head: Normocephalic and  atraumatic.   Eyes:      General: Scleral icterus present.   Cardiovascular:      Rate and Rhythm: Normal rate and regular rhythm.   Pulmonary:      Effort: Pulmonary effort is normal.      Breath sounds: Normal breath sounds.   Abdominal:      Palpations: Abdomen is soft.   Skin:     General: Skin is warm and dry.      Coloration: Skin is jaundiced.   Neurological:      General: No focal deficit present.      Mental Status: She is alert and oriented to person, place, and time.   Psychiatric:         Behavior: Behavior normal.         Judgment: Judgment normal.           Labs:  Recent Labs     09/09/24  1830 09/10/24  0240   SODIUM 134* 136   POTASSIUM 4.2 4.2   CHLORIDE 98 101   CO2 22 21   BUN 13 11   CREATININE 0.20* 0.29*   MAGNESIUM 1.9  --    CALCIUM 9.5 9.2     Recent Labs     09/09/24  1830 09/10/24  0240   ALTSGPT 120* 107*   ASTSGOT 113* 93*   ALKPHOSPHAT 2226* 2051*   TBILIRUBIN 7.3* 7.6*   DBILIRUBIN 6.1*  --    LIPASE 6*  --    GLUCOSE 106* 98     Recent Labs     09/09/24  1830 09/10/24  0240   WBC 5.1 4.6*   NEUTSPOLYS 72.20* 60.30   LYMPHOCYTES 13.90* 16.40*   MONOCYTES 11.30 18.90*   EOSINOPHILS 0.80 1.30   BASOPHILS 0.90 1.10   ASTSGOT 113* 93*   ALTSGPT 120* 107*   ALKPHOSPHAT 2226* 2051*   TBILIRUBIN 7.3* 7.6*     Recent Labs     09/09/24  1123 09/09/24  1830 09/10/24  0240   RBC 3.23* 2.86* 2.77*   HEMOGLOBIN 8.9* 7.9* 7.5*   HEMATOCRIT 26.9* 24.3* 23.8*   PLATELETCT 399* 351 335     Recent Results (from the past 24 hour(s))   COMPLETE CBC & AUTO DIFF WBC    Collection Time: 09/09/24 11:23 AM   Result Value Ref Range    Leukocytes, Absolute 5.3 3.7 - 10.6 x10E3/uL    RBC 3.23 (L) 4.19 - 5.21 x10e6/uL    Hemoglobin 8.9 (L) 12.3 - 16.0 g/dL    Hematocrit 26.9 (L) 36.0 - 47.0 %    MCV 83.4 81.0 - 99.0 fL    MCH 27.5 (L) 28.0 - 33.8 pg    MCHC 33.0 (L) 33.1 - 36.5 g/dL    RDW 24.5 (H) 11.8 - 14.0 %    Platelet Count 399 (H) 146 - 390 x10E3/uL    MPV 7.6 6.4 - 10.2 fL   CBC with Differential     Collection Time: 09/09/24  6:30 PM   Result Value Ref Range    WBC 5.1 4.8 - 10.8 K/uL    RBC 2.86 (L) 4.20 - 5.40 M/uL    Hemoglobin 7.9 (L) 12.0 - 16.0 g/dL    Hematocrit 24.3 (L) 37.0 - 47.0 %    MCV 85.0 81.4 - 97.8 fL    MCH 27.6 27.0 - 33.0 pg    MCHC 32.5 32.2 - 35.5 g/dL    RDW 75.5 (H) 35.9 - 50.0 fL    Platelet Count 351 164 - 446 K/uL    MPV 9.2 9.0 - 12.9 fL    Neutrophils-Polys 72.20 (H) 44.00 - 72.00 %    Lymphocytes 13.90 (L) 22.00 - 41.00 %    Monocytes 11.30 0.00 - 13.40 %    Eosinophils 0.80 0.00 - 6.90 %    Basophils 0.90 0.00 - 1.80 %    Nucleated RBC 0.40 (H) 0.00 - 0.20 /100 WBC    Neutrophils (Absolute) 3.73 1.82 - 7.42 K/uL    Lymphs (Absolute) 0.71 (L) 1.00 - 4.80 K/uL    Monos (Absolute) 0.58 0.00 - 0.85 K/uL    Eos (Absolute) 0.04 0.00 - 0.51 K/uL    Baso (Absolute) 0.05 0.00 - 0.12 K/uL    NRBC (Absolute) 0.02 K/uL    Hypochromia 2+ (A)     Anisocytosis 2+ (A)     Macrocytosis 2+ (A)    Complete Metabolic Panel    Collection Time: 09/09/24  6:30 PM   Result Value Ref Range    Sodium 134 (L) 135 - 145 mmol/L    Potassium 4.2 3.6 - 5.5 mmol/L    Chloride 98 96 - 112 mmol/L    Co2 22 20 - 33 mmol/L    Anion Gap 14.0 7.0 - 16.0    Glucose 106 (H) 65 - 99 mg/dL    Bun 13 8 - 22 mg/dL    Creatinine 0.20 (L) 0.50 - 1.40 mg/dL    Calcium 9.5 8.5 - 10.5 mg/dL    Correct Calcium 10.0 8.5 - 10.5 mg/dL    AST(SGOT) 113 (H) 12 - 45 U/L    ALT(SGPT) 120 (H) 2 - 50 U/L    Alkaline Phosphatase 2226 (H) 30 - 99 U/L    Total Bilirubin 7.3 (H) 0.1 - 1.5 mg/dL    Albumin 3.4 3.2 - 4.9 g/dL    Total Protein 6.9 6.0 - 8.2 g/dL    Globulin 3.5 1.9 - 3.5 g/dL    A-G Ratio 1.0 g/dL   Lipase    Collection Time: 09/09/24  6:30 PM   Result Value Ref Range    Lipase 6 (L) 11 - 82 U/L   BILIRUBIN DIRECT    Collection Time: 09/09/24  6:30 PM   Result Value Ref Range    Direct Bilirubin 6.1 (H) 0.1 - 0.5 mg/dL   BILIRUBIN INDIRECT    Collection Time: 09/09/24  6:30 PM   Result Value Ref Range    Indirect Bilirubin 1.2  (H) 0.0 - 1.0 mg/dL   DIFFERENTIAL MANUAL    Collection Time: 09/09/24  6:30 PM   Result Value Ref Range    Bands-Stabs 0.90 0.00 - 10.00 %    Manual Diff Status PERFORMED    PERIPHERAL SMEAR REVIEW    Collection Time: 09/09/24  6:30 PM   Result Value Ref Range    Peripheral Smear Review see below    PLATELET ESTIMATE    Collection Time: 09/09/24  6:30 PM   Result Value Ref Range    Plt Estimation Normal    MORPHOLOGY    Collection Time: 09/09/24  6:30 PM   Result Value Ref Range    RBC Morphology Present     Poikilocytosis 1+     Target Cells 1+    MAGNESIUM    Collection Time: 09/09/24  6:30 PM   Result Value Ref Range    Magnesium 1.9 1.5 - 2.5 mg/dL   ESTIMATED GFR    Collection Time: 09/09/24  6:30 PM   Result Value Ref Range    GFR (CKD-EPI) 132 >60 mL/min/1.73 m 2   Urinalysis    Collection Time: 09/10/24  2:40 AM    Specimen: Urine   Result Value Ref Range    Color DK Yellow     Character Clear     Specific Gravity 1.012 <1.035    Ph 6.5 5.0 - 8.0    Glucose Negative Negative mg/dL    Ketones Negative Negative mg/dL    Protein Negative Negative mg/dL    Bilirubin Negative Negative    Urobilinogen, Urine 0.2 Negative    Nitrite Negative Negative    Leukocyte Esterase Negative Negative    Occult Blood Negative Negative    Micro Urine Req see below    Comp Metabolic Panel (CMP)    Collection Time: 09/10/24  2:40 AM   Result Value Ref Range    Sodium 136 135 - 145 mmol/L    Potassium 4.2 3.6 - 5.5 mmol/L    Chloride 101 96 - 112 mmol/L    Co2 21 20 - 33 mmol/L    Anion Gap 14.0 7.0 - 16.0    Glucose 98 65 - 99 mg/dL    Bun 11 8 - 22 mg/dL    Creatinine 0.29 (L) 0.50 - 1.40 mg/dL    Calcium 9.2 8.5 - 10.5 mg/dL    Correct Calcium 9.8 8.5 - 10.5 mg/dL    AST(SGOT) 93 (H) 12 - 45 U/L    ALT(SGPT) 107 (H) 2 - 50 U/L    Alkaline Phosphatase 2051 (H) 30 - 99 U/L    Total Bilirubin 7.6 (H) 0.1 - 1.5 mg/dL    Albumin 3.3 3.2 - 4.9 g/dL    Total Protein 6.6 6.0 - 8.2 g/dL    Globulin 3.3 1.9 - 3.5 g/dL    A-G Ratio 1.0  g/dL   CBC with Differential    Collection Time: 09/10/24  2:40 AM   Result Value Ref Range    WBC 4.6 (L) 4.8 - 10.8 K/uL    RBC 2.77 (L) 4.20 - 5.40 M/uL    Hemoglobin 7.5 (L) 12.0 - 16.0 g/dL    Hematocrit 23.8 (L) 37.0 - 47.0 %    MCV 85.9 81.4 - 97.8 fL    MCH 27.1 27.0 - 33.0 pg    MCHC 31.5 (L) 32.2 - 35.5 g/dL    RDW 77.6 (H) 35.9 - 50.0 fL    Platelet Count 335 164 - 446 K/uL    MPV 9.9 9.0 - 12.9 fL    Neutrophils-Polys 60.30 44.00 - 72.00 %    Lymphocytes 16.40 (L) 22.00 - 41.00 %    Monocytes 18.90 (H) 0.00 - 13.40 %    Eosinophils 1.30 0.00 - 6.90 %    Basophils 1.10 0.00 - 1.80 %    Immature Granulocytes 2.00 (H) 0.00 - 0.90 %    Nucleated RBC 0.40 (H) 0.00 - 0.20 /100 WBC    Neutrophils (Absolute) 2.75 1.82 - 7.42 K/uL    Lymphs (Absolute) 0.75 (L) 1.00 - 4.80 K/uL    Monos (Absolute) 0.86 (H) 0.00 - 0.85 K/uL    Eos (Absolute) 0.06 0.00 - 0.51 K/uL    Baso (Absolute) 0.05 0.00 - 0.12 K/uL    Immature Granulocytes (abs) 0.09 0.00 - 0.11 K/uL    NRBC (Absolute) 0.02 K/uL   ESTIMATED GFR    Collection Time: 09/10/24  2:40 AM   Result Value Ref Range    GFR (CKD-EPI) 120 >60 mL/min/1.73 m 2         Imaging:  No image results found.        MDM Data Review:  -Records reviewed and summarized in current documentation  -I personally reviewed and interpreted the laboratory results  -I personally reviewed the radiology images  -I have personally reviewed medications    Hospital Problem List:  Active Hospital Problems    Diagnosis     Biliary stent obstruction, initial encounter [T85.590A]     Pancreatic cancer metastasized to liver (HCC) [C25.9, C78.7]        Impressions:  64-year-old female having undergone treatment for pancreatic cancer.  Patient has had chemotherapy and radiation therapy.  She had an EGD done within the last 2 months which is present in the chart.  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  She has a biliary metal stent placed which was placed in [place].    On cross-sectional imaging it is  difficult to ascertain if there is tumor overgrowth or if the stent has slipped down below a stenosis.  We discussed different options to include ERCP versus MRCP.  Patient wishes to pursue MRCP prior to considering ERCP.  I am going to order that.  She can be on a clear liquid diet in the interim.    Recommendations:  MRCP to guide decisions regarding ERCP.  Further recommendations to follow based on study.  Order for MRCP placed.

## 2024-09-10 NOTE — PROGRESS NOTES
Received pt from ED on a gurney, transferred to the bed. Pt is alert and oriented. Pt stated that they would like a private room which is not available on this unit at this time. This RN explained that to the Patient, Charge RN informed.   Pt reports a pain rating of 7/10, Oxycodone offered but pt says it makes her sick and would Prefer dilaudid, provider informed. No new orders at this time. Oxycodone 10mg wasted with GUILLERMINA PURI as the witness.

## 2024-09-10 NOTE — ASSESSMENT & PLAN NOTE
MRCP indicated Artifact from biliary stent present. Enhancing soft tissue at the proximal aspect concerning for tumor occlusion of the stent.  GI recommended patient proceed with ERCP to help resolve this occlusion.  Patient with increasing pain control with 6 mg Dilaudid with some breakthrough pain postoperatively 9/12.  Patient with increasing pain from constipation, concern of ileus. KUB ordered WNL.  Patient treated with lactulose a.m. 9/12 with 2 successful bowel movements.  Likely source of pain is biliary obstruction versus constipation still.  Difficult to interpret as Zofran and hydromorphone which helps some symptoms can also contribute to constipation.  Will attempt to optimize to find middle ground.  Discussion with patient and family about risk of ileus with lactulose.  They would like a prescription to be used only in emergencies if patient is unable to have bowel movements and is having significant pain.  Patient having continued constipation while inpatient despite addition of lactulose for second dose.  Will add lactulose and titrate to desired frequency/consistency of BM's    Plan:  - Lactulose 30mL up to TID, titrate to 1 BM/day  - Hydromorphone 4 to 6 mg every 3 hours as needed  -Zofran 4 mg every 3 hours as needed for nausea  - cont bowel regimen with 17g Miralax BID, milk of magnesia, senna  -Continue to monitor bilirubin levels  -Consider lactulose prescription on discharge

## 2024-09-10 NOTE — ED PROVIDER NOTES
ED Provider Note    CHIEF COMPLAINT  Chief Complaint   Patient presents with    Abdominal Pain    Sent by [facility] from [facility] for biliary stent obstruction. Hx stage 4 pancreatic cancer. 4/10 RUQ abdominal pain after 1.5 mg Dilaudid at CT       EXTERNAL RECORDS REVIEWED  External ED Note outside ER note with CT finding of biliary stent obstruction, elevated bilirubin.    HPI/ROS  LIMITATION TO HISTORY   Select: : None  OUTSIDE HISTORIAN(S):  Family      Bruce Wilson is a 64 y.o. female who presents as a transfer from [facility] for concern of biliary stent obstruction.  The stent was placed in [place] in November of last year.  Patient has a history of stage IV pancreatic cancer with metastatic disease.  She has been having increasing abdominal pain and jaundice.  She is here from [place] for infusions related to her pancreatic cancer.  CT was done at the outside hospital showing appropriate position of biliary stent but ductal dilation and pneumobilia.  Her bilirubin was found to be 8 today and the  has a log showing bilirubin is up to in the recent past.  GI was involved prior to transfer, Dr. Collins    PAST MEDICAL HISTORY   has a past medical history of Pancreatic cancer (HCC) (10/2023).    SURGICAL HISTORY  patient denies any surgical history    FAMILY HISTORY  History reviewed. No pertinent family history.    SOCIAL HISTORY  Social History     Tobacco Use    Smoking status: Never    Smokeless tobacco: Never   Vaping Use    Vaping status: Never Used   Substance and Sexual Activity    Alcohol use: Never    Drug use: Never    Sexual activity: Not on file       CURRENT MEDICATIONS  Home Medications       Reviewed by Kathleen Bedolla (Pharmacy Tech) on 09/09/24 at 1848  Med List Status: Complete     Medication Last Dose Status   azithromycin (ZITHROMAX) 250 MG Tab 9/8/2024 Active   cycloSPORINE (RESTASIS) 0.05 % ophthalmic emulsion 9/8/2024 Active   esomeprazole (NEXIUM) 20 MG  "capsule 9/8/2024 Active   Ivermectin 3 MG Tab 9/8/2024 Active   loratadine (CLARITIN) 10 MG Tab 9/8/2024 Active   lovastatin (MEVACOR) 10 MG tablet 9/8/2024 Active   Melatonin 10 MG Tab 9/8/2024 Active   morphine (ROXANOL) 20 MG/ML Solution 9/9/2024 Active   Pancrelipase, Lip-Prot-Amyl, (CREON) 32944-72136 units Cap DR Particles 9/8/2024 Active   Propylene Glycol (SYSTANE BALANCE) 0.6 % Solution unk Active                    ALLERGIES  No Known Allergies    PHYSICAL EXAM  VITAL SIGNS: /63   Pulse 86   Temp 37.2 °C (98.9 °F) (Temporal)   Resp 20   Ht 1.575 m (5' 2\")   Wt 43.8 kg (96 lb 8 oz)   SpO2 96%   BMI 17.65 kg/m²    Pulse oximetry interpretation: I interpret the pulse oximetry as normal.  Constitutional: Awake and alert. No acute distress.  Jaundiced, and pain  Head: NCAT.  HEENT: scleral icterus. PERRLA.  Neck: Grossly normal range of motion. Airway midline.  Cardiovascular: Normal heart rate, Normal rhythm.  Thorax & Lungs: No respiratory distress. Clear to Auscultation bilaterally.  Abdomen: Normal inspection. Nontender. Nondistended  Skin: No obvious rash.  Back: No tenderness, No CVA tenderness.   Musculoskeletal: No obvious deformity. Moves all extremities Well.  Neurologic: A&Ox3.   Psychiatric: Mood and affect are appropriate for situation.    EKG/LABS  Results for orders placed or performed during the hospital encounter of 09/09/24   CBC with Differential   Result Value Ref Range    WBC 5.1 4.8 - 10.8 K/uL    RBC 2.86 (L) 4.20 - 5.40 M/uL    Hemoglobin 7.9 (L) 12.0 - 16.0 g/dL    Hematocrit 24.3 (L) 37.0 - 47.0 %    MCV 85.0 81.4 - 97.8 fL    MCH 27.6 27.0 - 33.0 pg    MCHC 32.5 32.2 - 35.5 g/dL    RDW 75.5 (H) 35.9 - 50.0 fL    Platelet Count 351 164 - 446 K/uL    MPV 9.2 9.0 - 12.9 fL    Neutrophils-Polys 72.20 (H) 44.00 - 72.00 %    Lymphocytes 13.90 (L) 22.00 - 41.00 %    Monocytes 11.30 0.00 - 13.40 %    Eosinophils 0.80 0.00 - 6.90 %    Basophils 0.90 0.00 - 1.80 %    Nucleated RBC " 0.40 (H) 0.00 - 0.20 /100 WBC    Neutrophils (Absolute) 3.73 1.82 - 7.42 K/uL    Lymphs (Absolute) 0.71 (L) 1.00 - 4.80 K/uL    Monos (Absolute) 0.58 0.00 - 0.85 K/uL    Eos (Absolute) 0.04 0.00 - 0.51 K/uL    Baso (Absolute) 0.05 0.00 - 0.12 K/uL    NRBC (Absolute) 0.02 K/uL    Hypochromia 2+ (A)     Anisocytosis 2+ (A)     Macrocytosis 2+ (A)    Complete Metabolic Panel   Result Value Ref Range    Sodium 134 (L) 135 - 145 mmol/L    Potassium 4.2 3.6 - 5.5 mmol/L    Chloride 98 96 - 112 mmol/L    Co2 22 20 - 33 mmol/L    Anion Gap 14.0 7.0 - 16.0    Glucose 106 (H) 65 - 99 mg/dL    Bun 13 8 - 22 mg/dL    Creatinine 0.20 (L) 0.50 - 1.40 mg/dL    Calcium 9.5 8.5 - 10.5 mg/dL    Correct Calcium 10.0 8.5 - 10.5 mg/dL    AST(SGOT) 113 (H) 12 - 45 U/L    ALT(SGPT) 120 (H) 2 - 50 U/L    Alkaline Phosphatase 2226 (H) 30 - 99 U/L    Total Bilirubin 7.3 (H) 0.1 - 1.5 mg/dL    Albumin 3.4 3.2 - 4.9 g/dL    Total Protein 6.9 6.0 - 8.2 g/dL    Globulin 3.5 1.9 - 3.5 g/dL    A-G Ratio 1.0 g/dL   Lipase   Result Value Ref Range    Lipase 6 (L) 11 - 82 U/L   BILIRUBIN DIRECT   Result Value Ref Range    Direct Bilirubin 6.1 (H) 0.1 - 0.5 mg/dL   BILIRUBIN INDIRECT   Result Value Ref Range    Indirect Bilirubin 1.2 (H) 0.0 - 1.0 mg/dL   DIFFERENTIAL MANUAL   Result Value Ref Range    Bands-Stabs 0.90 0.00 - 10.00 %    Manual Diff Status PERFORMED    PERIPHERAL SMEAR REVIEW   Result Value Ref Range    Peripheral Smear Review see below    PLATELET ESTIMATE   Result Value Ref Range    Plt Estimation Normal    MORPHOLOGY   Result Value Ref Range    RBC Morphology Present     Poikilocytosis 1+     Target Cells 1+    MAGNESIUM   Result Value Ref Range    Magnesium 1.9 1.5 - 2.5 mg/dL   ESTIMATED GFR   Result Value Ref Range    GFR (CKD-EPI) 132 >60 mL/min/1.73 m 2     RADIOLOGY/PROCEDURES   I have independently interpreted the diagnostic imaging associated with this visit and am waiting the final reading from the radiologist.   My  preliminary interpretation is as follows:   CT from outside hospital with biliary stent, concern for obstruction    COURSE & MEDICAL DECISION MAKING    ASSESSMENT, COURSE AND PLAN  Care Narrative:   64-year-old female history of pancreatic cancer with biliary stent placed in November here from [facility] as a transfer for concern of biliary stent obstruction  Afebrile normal vitals  Jaundice on exam, and pain  Outside hospital records reviewed.  Bilirubin is elevated.  CT showing concern of stent obstruction  GI had been involved in her care prior to arrival here.  Dilaudid ordered for pain medication.  Patient admitted to the hospitalist for further management by GI.      ADDITIONAL PROBLEMS MANAGED    Pancreatic cancer  Cancer pain    DISPOSITION AND DISCUSSIONS  I have discussed management of the patient with the following physicians and RAMY's:  hospitalist    Discussion of management with other QHP or appropriate source(s): None     Barriers to care at this time, including but not limited to: Patient does not have established PCP.     Decision tools and prescription drugs considered including, but not limited to:  none .    FINAL DIAGNOSIS  1. Obstruction of biliary stent, initial encounter    2. Malignant neoplasm of pancreas, unspecified location of malignancy (HCC)    3. Normocytic anemia         Electronically signed by: Mckenzie Butler D.O., 9/9/2024 5:52 PM

## 2024-09-10 NOTE — DIETARY
"Nutrition Services: Initial Assessment     Day 1 of admit. Bruce Wilson is a 64 y.o. female with admitting DX of Biliary stent obstruction, initial encounter [T85.856A]     Consult received for BMI <19 and MST score >/= 2 for 24-33lb wt loss x 3 months and poor PO     Nutrition Assessment:      Height: 157.5 cm (5' 2\")  Weight: 44.4 kg (97 lb 14.2 oz)  Body mass index is 17.9 kg/m²., BMI classification: Underweight.  Wt Readings from Last 6 Encounters:   09/09/24 43.8 kg (96 lb 8 oz)      Objective:  Presents with PMH stage IV pancreatic cancer s/p bilary stent placement. Pt reported approximately 4-day hx of worsening abd pain w/ associated jaundice.  Pertinent medical hx:   Past Medical History:   Diagnosis Date    Pancreatic cancer (HCC) 10/2023     Pertinent labs: Reviewed.  Pertinent meds: BM protocol  Skin/wounds: No documented wounds, no edema   Food Allergies: NKA  Last BM:  (PTA),   per flowsheets  Pt to pursue MRCP to guide decisions regarding ERCP. MRCP order was placed per GI. CLD in meantime.      Current diet order:   Regular w/ low fat modification     Subjective:   RD visited pt at bedside. Pt said that she tries to eat 3 meals/day and that she associates eating w/ pain. Pt mentioned that food doesn't taste great d/t taste changes that come along w/ cancer treatment.Pt reported that her UBW is 118lbs and that she lost 20lbs in April and then another 8lbs in August. Pt asked questions regarding foods recommended she eat. Pt mentioned that she takes creon at home as well. Pt said she would like 6 smaller meals/day instead of 3 bigger meals/day.     Nutrition Focused Physical Exam (NFPE) - pt consented to exam  Weight change: No chart review.   Muscle mass: Mild-moderate loss. Pt w/ temporal scooping as well as prominence in clavicle area  Subcutaneous fat: Mild-moderate loss Pt w/ flat buccal fat pads  Fluid Accumulation: NA  Reduced  Strength: N/A in acute care setting.     Nutrition Diagnosis:  "     Per ASPEN criteria, pt meets at least moderate malnutrition in context of chronic illness related to stage IV pancreatic cancer as evidenced by mild/moderate muscle wasting and moderate fat loss noted in NFPE.    Communicated w/ resident.     Nutrition Interventions:      Provide Ensure Plus (provides 350 calories, 13 g protein per 8 fl oz) TID.  RD to adjust meals to 6 small meals/day  Patient aware of active plan of care as appropriate.     Nutrition Monitoring and Evaluation:     Monitor nutrition POC  Encourage PO intake >50% of meals  Document intake of PO as % consumed in ADLs.  Monitor weight trends.      RD following and will provide updated recommendations as indicated.

## 2024-09-10 NOTE — H&P
[facility] FAMILY MEDICINE HISTORY AND PHYSICAL    PATIENT ID:  NAME:  Bruce Wilson  MRN:               [id number]  YOB: 1960    Date of Admission: 9/9/2024     Attending: Darrell Roa MD    Resident: Yovani Carrillo MD     Primary Care Physician:  Pcp Pt States None    CC:  Abdominal Pain     HPI: Bruce Wilson is a 64 y.o. female with significant past medical history stage IV pancreatic cancer s/p biliary stent placement, who presented with approximately 4-day history of worsening abdominal pain with associated jaundice.  Patient reports she was visiting [place], originally from [place], for purposes of infusion treatments in [place] for her pancreatic cancer.  Unfortunately, patient was only able to receive 1 treatment when she began to feel unwell and was scheduled to have a second treatment today however, due to pain, she was unable to attend.  The patient's  keeps a detailed log of her labs which demonstrated an uptrending total bilirubin over the past couple of weeks and notable for an increase from 2 up to 8.  Per patient, her last EGD was in early August which confirmed integrity and placement of stent.  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  Patient denies any hematemesis during current presentation however, does endorse at least 1 episode of nonbilious emesis.  The patient normally is able to manage her pain with p.o. morphine however, pain was refractory to morphine and this is what ultimately brought her into [facility].  After being evaluated there, it was determined that she needed to be transferred to higher level of care and was transferred to [facility].      ER Course:  In the ED, vital signs stable.  Labs: CBC notable for hemoglobin 7.9 otherwise largely unremarkable.  CHEM panel notable for sodium 134, , , alkaline phosphatase 2226, total bilirubin 7.3, direct bilirubin 6.1.  Lipase 6.  Imaging: CT  abdomen/pelvis with IV contrast notable for common bile duct stent in appropriate position, mild intrahepatic biliary ductal dilation with pneumobilia, infiltrative disease of liver suggestive of metastasis, lung base nodule suggestive of metastasis and mesenteric root and retroperitoneal lymphadenopathy suggestive of metastatic disease.  Patient was given 1 mg IV Dilaudid and a 1 L NS bolus.    REVIEW OF SYSTEMS:   Ten systems reviewed and were negative except as noted in the HPI.                PAST MEDICAL HISTORY:  Past Medical History:   Diagnosis Date    Pancreatic cancer (HCC) 10/2023       PAST SURGICAL HISTORY:  History reviewed. No pertinent surgical history.    FAMILY HISTORY:  History reviewed. No pertinent family history.    SOCIAL HISTORY:   Pt lives in [place] with her .  Tobacco use: Denies  ETOH use: Denies  Drug use: Denies    ALLERGIES:  No Known Allergies    OUTPATIENT MEDICATIONS:    Current Facility-Administered Medications:     NS infusion, , Intravenous, Continuous, Yovani Carrillo M.D., Last Rate: 100 mL/hr at 09/09/24 2218, New Bag at 09/09/24 2218    senna-docusate (Pericolace Or Senokot S) 8.6-50 MG per tablet 2 Tablet, 2 Tablet, Oral, Q EVENING, 2 Tablet at 09/09/24 2102 **AND** polyethylene glycol/lytes (Miralax) Packet 1 Packet, 1 Packet, Oral, QDAY PRN, Yovani Carrillo M.D.    Pharmacy Consult Request ...Pain Management Review 1 Each, 1 Each, Other, PHARMACY TO DOSE, Yovani Carrillo M.D.    [DISCONTINUED] oxyCODONE immediate-release (Roxicodone) tablet 5 mg, 5 mg, Oral, Q3HRS PRN **OR** [DISCONTINUED] oxyCODONE immediate release (Roxicodone) tablet 10 mg, 10 mg, Oral, Q3HRS PRN **OR** HYDROmorphone (Dilaudid) injection 0.5 mg, 0.5 mg, Intravenous, Q3HRS PRN, Yovani Carrillo M.D., 0.5 mg at 09/09/24 2215    ondansetron (Zofran) syringe/vial injection 4 mg, 4 mg, Intravenous, Q4HRS PRN, Yovani Carrillo M.D., 4 mg at 09/09/24 2227    PHYSICAL EXAM:  Vitals:    09/09/24  1825 24 19024 210   BP:  (!) 160/70 134/63 (!) 142/71   Pulse: 89 79 86 81   Resp: 20 19 20 18   Temp:    36.7 °C (98 °F)   TempSrc:    Temporal   SpO2: 96% 94% 96% 99%   Weight:       Height:       , Temp (24hrs), Av.9 °C (98.5 °F), Min:36.7 °C (98 °F), Max:37.2 °C (98.9 °F)  , Pulse Oximetry: 99 %, O2 (LPM): 0, O2 Delivery Device: None - Room Air    General: Cachectic, pleasant  Skin: Jaundiced  HEENT: Normocephalic, atraumatic, EOMI, scleral icterus, neck supple  Lungs:  Symmetrical.  CTAB with no W/R/R.  Good air movement   Cardiovascular:  S1/S2 RRR without M/R/G.  Abdomen: Deferred secondary to pain  Extremities:  Full range of motion. No gross deformities noted. 2+ pulses in all extremities.   Neuro:  Muscle tone is normal. Cranial nerves II-XII grossly intact.         LAB TESTS:   Recent Labs     24  1123 24  1830   WBC  --  5.1   RBC 3.23* 2.86*   HEMOGLOBIN 8.9* 7.9*   HEMATOCRIT 26.9* 24.3*   MCV 83.4 85.0   MCH 27.5* 27.6   RDW 24.5* 75.5*   PLATELETCT 399* 351   MPV 7.6 9.2   NEUTSPOLYS  --  72.20*   LYMPHOCYTES  --  13.90*   MONOCYTES  --  11.30   EOSINOPHILS  --  0.80   BASOPHILS  --  0.90   RBCMORPHOLO  --  Present         Recent Labs     24  1830   SODIUM 134*   POTASSIUM 4.2   CHLORIDE 98   CO2 22   BUN 13   CREATININE 0.20*   CALCIUM 9.5   MAGNESIUM 1.9   ALBUMIN 3.4       CULTURES:   Results       Procedure Component Value Units Date/Time    Urinalysis [[id number]]     Order Status: Sent Specimen: Urine             IMAGING:  OUTSIDE IMAGES-CT ABDOMEN /PELVIS   Final Result          CONSULTS:   GI    ASSESSMENT/PLAN: 64 y.o. female with history of stage IV pancreatic cancer admitted for presumed biliary stent obstruction.    * Biliary stent obstruction, initial encounter- (present on admission)  Assessment & Plan  Biliary stent obstruction suspected in setting of metastatic pancreatic cancer.  Bilirubin increased from 2 up to 8 over the past couple  of weeks with associated jaundice and worsening abdominal pain.  Repeat total bilirubin on admission 7.3 with direct bilirubin 6.1, suspect decrease to be dilutional.  Biliary stent appears to be intact and properly placed per CT abdomen.  Physical exam and labs reassuring at this time.  Patient remains high risk for encephalopathy.    Plan:  - GI consulted, will see patient in the morning  - N.p.o. at midnight  -Continue to monitor bilirubin levels    Pancreatic cancer metastasized to liver (HCC)- (present on admission)  Assessment & Plan  Metastatic pancreatic cancer with lesions to the liver, lungs and possible retroperitoneal involvement per CT abdomen on admission.  Patient is s/p biliary stent placement with ongoing chemotherapy treatment.  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  No evidence of GI bleed on current presentation.    Plan:  - GI consulted for possible biliary drainage  - Pain management  - MIVF          Core Measures:  Fluids: NS at 100 mL/h  Lines: PIV, central port  Abx: None  Diet: Regular, NPO at midnight  PPX: SCDs/TEDs, chemoprophylaxis held  Wound care: No wounds    CODE STATUS: Full code      I anticipate the patient:  (1) will require at least two midnights for appropriate medical management, necessitating inpatient admission because patient is medically complicated with metastatic disease likely requiring procedural intervention for biliary drainage.    Yovani Carrillo MD  [facility]

## 2024-09-10 NOTE — ASSESSMENT & PLAN NOTE
Per ASPEN guidelines.  Patient says that she is not hungry even though food does sound good does not taste good.  - Continue Megace for appetite stimulation

## 2024-09-11 ENCOUNTER — ANESTHESIA EVENT (OUTPATIENT)
Dept: SURGERY | Facility: MEDICAL CENTER | Age: 64
DRG: 445 | End: 2024-09-11
Payer: COMMERCIAL

## 2024-09-11 ENCOUNTER — ANESTHESIA (OUTPATIENT)
Dept: SURGERY | Facility: MEDICAL CENTER | Age: 64
DRG: 445 | End: 2024-09-11
Payer: COMMERCIAL

## 2024-09-11 ENCOUNTER — APPOINTMENT (OUTPATIENT)
Dept: RADIOLOGY | Facility: MEDICAL CENTER | Age: 64
DRG: 445 | End: 2024-09-11
Attending: INTERNAL MEDICINE
Payer: COMMERCIAL

## 2024-09-11 PROBLEM — D64.9 ANEMIA: Status: ACTIVE | Noted: 2024-09-11

## 2024-09-11 LAB
ABO + RH BLD: NORMAL
ABO GROUP BLD: NORMAL
ALBUMIN SERPL BCP-MCNC: 2.9 G/DL (ref 3.2–4.9)
ALBUMIN/GLOB SERPL: 1 G/DL
ALP SERPL-CCNC: 1925 U/L (ref 30–99)
ALT SERPL-CCNC: 93 U/L (ref 2–50)
ANION GAP SERPL CALC-SCNC: 9 MMOL/L (ref 7–16)
AST SERPL-CCNC: 91 U/L (ref 12–45)
BARCODED ABORH UBTYP: 6200
BARCODED PRD CODE UBPRD: NORMAL
BARCODED UNIT NUM UBUNT: NORMAL
BASOPHILS # BLD AUTO: 0.6 % (ref 0–1.8)
BASOPHILS # BLD: 0.03 K/UL (ref 0–0.12)
BILIRUB SERPL-MCNC: 7 MG/DL (ref 0.1–1.5)
BLD GP AB SCN SERPL QL: NORMAL
BUN SERPL-MCNC: 8 MG/DL (ref 8–22)
CALCIUM ALBUM COR SERPL-MCNC: 9.5 MG/DL (ref 8.5–10.5)
CALCIUM SERPL-MCNC: 8.6 MG/DL (ref 8.5–10.5)
CHLORIDE SERPL-SCNC: 100 MMOL/L (ref 96–112)
CO2 SERPL-SCNC: 24 MMOL/L (ref 20–33)
COMPONENT R 8504R: NORMAL
CREAT SERPL-MCNC: 0.44 MG/DL (ref 0.5–1.4)
EOSINOPHIL # BLD AUTO: 0.08 K/UL (ref 0–0.51)
EOSINOPHIL NFR BLD: 1.7 % (ref 0–6.9)
ERYTHROCYTE [DISTWIDTH] IN BLOOD BY AUTOMATED COUNT: 80.6 FL (ref 35.9–50)
FERRITIN SERPL-MCNC: 568 NG/ML (ref 10–291)
GFR SERPLBLD CREATININE-BSD FMLA CKD-EPI: 109 ML/MIN/1.73 M 2
GLOBULIN SER CALC-MCNC: 3 G/DL (ref 1.9–3.5)
GLUCOSE SERPL-MCNC: 106 MG/DL (ref 65–99)
HCT VFR BLD AUTO: 20.1 % (ref 37–47)
HCT VFR BLD AUTO: 24.9 % (ref 37–47)
HGB BLD-MCNC: 6.6 G/DL (ref 12–16)
HGB BLD-MCNC: 8.2 G/DL (ref 12–16)
IMM GRANULOCYTES # BLD AUTO: 0.1 K/UL (ref 0–0.11)
IMM GRANULOCYTES NFR BLD AUTO: 2.1 % (ref 0–0.9)
IRON SATN MFR SERPL: 15 % (ref 15–55)
IRON SERPL-MCNC: 40 UG/DL (ref 40–170)
LYMPHOCYTES # BLD AUTO: 0.57 K/UL (ref 1–4.8)
LYMPHOCYTES NFR BLD: 12.1 % (ref 22–41)
MCH RBC QN AUTO: 28.1 PG (ref 27–33)
MCHC RBC AUTO-ENTMCNC: 32.8 G/DL (ref 32.2–35.5)
MCV RBC AUTO: 85.5 FL (ref 81.4–97.8)
MONOCYTES # BLD AUTO: 0.84 K/UL (ref 0–0.85)
MONOCYTES NFR BLD AUTO: 17.8 % (ref 0–13.4)
NEUTROPHILS # BLD AUTO: 3.09 K/UL (ref 1.82–7.42)
NEUTROPHILS NFR BLD: 65.7 % (ref 44–72)
NRBC # BLD AUTO: 0 K/UL
NRBC BLD-RTO: 0 /100 WBC (ref 0–0.2)
PLATELET # BLD AUTO: 241 K/UL (ref 164–446)
PMV BLD AUTO: 9.2 FL (ref 9–12.9)
POTASSIUM SERPL-SCNC: 4 MMOL/L (ref 3.6–5.5)
PRODUCT TYPE UPROD: NORMAL
PROT SERPL-MCNC: 5.9 G/DL (ref 6–8.2)
RBC # BLD AUTO: 2.35 M/UL (ref 4.2–5.4)
RH BLD: NORMAL
SODIUM SERPL-SCNC: 133 MMOL/L (ref 135–145)
TIBC SERPL-MCNC: 274 UG/DL (ref 250–450)
UIBC SERPL-MCNC: 234 UG/DL (ref 110–370)
UNIT STATUS USTAT: NORMAL
WBC # BLD AUTO: 4.7 K/UL (ref 4.8–10.8)

## 2024-09-11 PROCEDURE — 0F798DZ DILATION OF COMMON BILE DUCT WITH INTRALUMINAL DEVICE, VIA NATURAL OR ARTIFICIAL OPENING ENDOSCOPIC: ICD-10-PCS | Performed by: INTERNAL MEDICINE

## 2024-09-11 PROCEDURE — 770004 HCHG ROOM/CARE - ONCOLOGY PRIVATE *

## 2024-09-11 PROCEDURE — 0DJ68ZZ INSPECTION OF STOMACH, VIA NATURAL OR ARTIFICIAL OPENING ENDOSCOPIC: ICD-10-PCS | Performed by: INTERNAL MEDICINE

## 2024-09-11 PROCEDURE — 160002 HCHG RECOVERY MINUTES (STAT): Performed by: INTERNAL MEDICINE

## 2024-09-11 PROCEDURE — 700117 HCHG RX CONTRAST REV CODE 255: Performed by: INTERNAL MEDICINE

## 2024-09-11 PROCEDURE — 160041 HCHG SURGERY MINUTES - EA ADDL 1 MIN LEVEL 4: Performed by: INTERNAL MEDICINE

## 2024-09-11 PROCEDURE — 160009 HCHG ANES TIME/MIN: Performed by: INTERNAL MEDICINE

## 2024-09-11 PROCEDURE — 86900 BLOOD TYPING SEROLOGIC ABO: CPT

## 2024-09-11 PROCEDURE — 86901 BLOOD TYPING SEROLOGIC RH(D): CPT

## 2024-09-11 PROCEDURE — 74328 X-RAY BILE DUCT ENDOSCOPY: CPT

## 2024-09-11 PROCEDURE — C1769 GUIDE WIRE: HCPCS | Performed by: INTERNAL MEDICINE

## 2024-09-11 PROCEDURE — 700111 HCHG RX REV CODE 636 W/ 250 OVERRIDE (IP)

## 2024-09-11 PROCEDURE — 99232 SBSQ HOSP IP/OBS MODERATE 35: CPT | Mod: GC | Performed by: STUDENT IN AN ORGANIZED HEALTH CARE EDUCATION/TRAINING PROGRAM

## 2024-09-11 PROCEDURE — 85014 HEMATOCRIT: CPT

## 2024-09-11 PROCEDURE — 700111 HCHG RX REV CODE 636 W/ 250 OVERRIDE (IP): Mod: JZ | Performed by: STUDENT IN AN ORGANIZED HEALTH CARE EDUCATION/TRAINING PROGRAM

## 2024-09-11 PROCEDURE — 700105 HCHG RX REV CODE 258: Performed by: STUDENT IN AN ORGANIZED HEALTH CARE EDUCATION/TRAINING PROGRAM

## 2024-09-11 PROCEDURE — 160029 HCHG SURGERY MINUTES - 1ST 30 MINS LEVEL 4: Performed by: INTERNAL MEDICINE

## 2024-09-11 PROCEDURE — A9270 NON-COVERED ITEM OR SERVICE: HCPCS

## 2024-09-11 PROCEDURE — 85025 COMPLETE CBC W/AUTO DIFF WBC: CPT

## 2024-09-11 PROCEDURE — 0FPB8DZ REMOVAL OF INTRALUMINAL DEVICE FROM HEPATOBILIARY DUCT, VIA NATURAL OR ARTIFICIAL OPENING ENDOSCOPIC: ICD-10-PCS | Performed by: INTERNAL MEDICINE

## 2024-09-11 PROCEDURE — 160048 HCHG OR STATISTICAL LEVEL 1-5: Performed by: INTERNAL MEDICINE

## 2024-09-11 PROCEDURE — P9016 RBC LEUKOCYTES REDUCED: HCPCS

## 2024-09-11 PROCEDURE — C1889 IMPLANT/INSERT DEVICE, NOC: HCPCS | Performed by: INTERNAL MEDICINE

## 2024-09-11 PROCEDURE — C1874 STENT, COATED/COV W/DEL SYS: HCPCS | Performed by: INTERNAL MEDICINE

## 2024-09-11 PROCEDURE — 700105 HCHG RX REV CODE 258

## 2024-09-11 PROCEDURE — 700102 HCHG RX REV CODE 250 W/ 637 OVERRIDE(OP)

## 2024-09-11 PROCEDURE — 0D798ZZ DILATION OF DUODENUM, VIA NATURAL OR ARTIFICIAL OPENING ENDOSCOPIC: ICD-10-PCS | Performed by: INTERNAL MEDICINE

## 2024-09-11 PROCEDURE — 43249 ESOPH EGD DILATION <30 MM: CPT | Performed by: INTERNAL MEDICINE

## 2024-09-11 PROCEDURE — 82728 ASSAY OF FERRITIN: CPT

## 2024-09-11 PROCEDURE — 85018 HEMOGLOBIN: CPT

## 2024-09-11 PROCEDURE — 86923 COMPATIBILITY TEST ELECTRIC: CPT

## 2024-09-11 PROCEDURE — 700101 HCHG RX REV CODE 250: Performed by: STUDENT IN AN ORGANIZED HEALTH CARE EDUCATION/TRAINING PROGRAM

## 2024-09-11 PROCEDURE — 36415 COLL VENOUS BLD VENIPUNCTURE: CPT

## 2024-09-11 PROCEDURE — C1726 CATH, BAL DIL, NON-VASCULAR: HCPCS | Performed by: INTERNAL MEDICINE

## 2024-09-11 PROCEDURE — 36430 TRANSFUSION BLD/BLD COMPNT: CPT

## 2024-09-11 PROCEDURE — 83540 ASSAY OF IRON: CPT

## 2024-09-11 PROCEDURE — 86850 RBC ANTIBODY SCREEN: CPT

## 2024-09-11 PROCEDURE — 160035 HCHG PACU - 1ST 60 MINS PHASE I: Performed by: INTERNAL MEDICINE

## 2024-09-11 PROCEDURE — 80053 COMPREHEN METABOLIC PANEL: CPT

## 2024-09-11 PROCEDURE — 83550 IRON BINDING TEST: CPT

## 2024-09-11 DEVICE — STENT WALLFLEX BILIARY RX FC RMV US 10X40: Type: IMPLANTABLE DEVICE | Site: BILE DUCT | Status: FUNCTIONAL

## 2024-09-11 RX ORDER — DIPHENHYDRAMINE HYDROCHLORIDE 50 MG/ML
12.5 INJECTION INTRAMUSCULAR; INTRAVENOUS
Status: DISCONTINUED | OUTPATIENT
Start: 2024-09-11 | End: 2024-09-11 | Stop reason: HOSPADM

## 2024-09-11 RX ORDER — ALBUTEROL SULFATE 5 MG/ML
2.5 SOLUTION RESPIRATORY (INHALATION)
Status: DISCONTINUED | OUTPATIENT
Start: 2024-09-11 | End: 2024-09-11 | Stop reason: HOSPADM

## 2024-09-11 RX ORDER — PANTOPRAZOLE SODIUM 40 MG/10ML
40 INJECTION, POWDER, LYOPHILIZED, FOR SOLUTION INTRAVENOUS DAILY
Status: DISCONTINUED | OUTPATIENT
Start: 2024-09-11 | End: 2024-09-17 | Stop reason: HOSPADM

## 2024-09-11 RX ORDER — LIDOCAINE HYDROCHLORIDE 20 MG/ML
INJECTION, SOLUTION EPIDURAL; INFILTRATION; INTRACAUDAL; PERINEURAL PRN
Status: DISCONTINUED | OUTPATIENT
Start: 2024-09-11 | End: 2024-09-11 | Stop reason: SURG

## 2024-09-11 RX ORDER — SODIUM CHLORIDE, SODIUM LACTATE, POTASSIUM CHLORIDE, CALCIUM CHLORIDE 600; 310; 30; 20 MG/100ML; MG/100ML; MG/100ML; MG/100ML
INJECTION, SOLUTION INTRAVENOUS
Status: DISCONTINUED | OUTPATIENT
Start: 2024-09-11 | End: 2024-09-11 | Stop reason: SURG

## 2024-09-11 RX ORDER — HALOPERIDOL 5 MG/ML
1 INJECTION INTRAMUSCULAR
Status: DISCONTINUED | OUTPATIENT
Start: 2024-09-11 | End: 2024-09-11 | Stop reason: HOSPADM

## 2024-09-11 RX ORDER — MIDAZOLAM HYDROCHLORIDE 1 MG/ML
INJECTION INTRAMUSCULAR; INTRAVENOUS PRN
Status: DISCONTINUED | OUTPATIENT
Start: 2024-09-11 | End: 2024-09-11 | Stop reason: SURG

## 2024-09-11 RX ORDER — AMOXICILLIN 250 MG
2 CAPSULE ORAL EVERY EVENING
Status: DISCONTINUED | OUTPATIENT
Start: 2024-09-11 | End: 2024-09-17 | Stop reason: HOSPADM

## 2024-09-11 RX ORDER — EPHEDRINE SULFATE 50 MG/ML
INJECTION, SOLUTION INTRAVENOUS PRN
Status: DISCONTINUED | OUTPATIENT
Start: 2024-09-11 | End: 2024-09-11 | Stop reason: SURG

## 2024-09-11 RX ORDER — HYDRALAZINE HYDROCHLORIDE 20 MG/ML
5 INJECTION INTRAMUSCULAR; INTRAVENOUS
Status: DISCONTINUED | OUTPATIENT
Start: 2024-09-11 | End: 2024-09-11 | Stop reason: HOSPADM

## 2024-09-11 RX ORDER — ESMOLOL HYDROCHLORIDE 10 MG/ML
INJECTION INTRAVENOUS PRN
Status: DISCONTINUED | OUTPATIENT
Start: 2024-09-11 | End: 2024-09-11 | Stop reason: SURG

## 2024-09-11 RX ORDER — ONDANSETRON 2 MG/ML
INJECTION INTRAMUSCULAR; INTRAVENOUS PRN
Status: DISCONTINUED | OUTPATIENT
Start: 2024-09-11 | End: 2024-09-11 | Stop reason: SURG

## 2024-09-11 RX ORDER — HYDROMORPHONE HYDROCHLORIDE 4 MG/1
4 TABLET ORAL
Status: DISCONTINUED | OUTPATIENT
Start: 2024-09-11 | End: 2024-09-17 | Stop reason: HOSPADM

## 2024-09-11 RX ORDER — INDOMETHACIN 100 MG
100 SUPPOSITORY, RECTAL RECTAL ONCE
Status: DISCONTINUED | OUTPATIENT
Start: 2024-09-11 | End: 2024-09-11 | Stop reason: HOSPADM

## 2024-09-11 RX ORDER — POLYETHYLENE GLYCOL 3350 17 G/17G
1 POWDER, FOR SOLUTION ORAL 2 TIMES DAILY
Status: DISCONTINUED | OUTPATIENT
Start: 2024-09-11 | End: 2024-09-17 | Stop reason: HOSPADM

## 2024-09-11 RX ORDER — ROCURONIUM BROMIDE 10 MG/ML
INJECTION, SOLUTION INTRAVENOUS PRN
Status: DISCONTINUED | OUTPATIENT
Start: 2024-09-11 | End: 2024-09-11 | Stop reason: SURG

## 2024-09-11 RX ORDER — ONDANSETRON 2 MG/ML
4 INJECTION INTRAMUSCULAR; INTRAVENOUS
Status: DISCONTINUED | OUTPATIENT
Start: 2024-09-11 | End: 2024-09-11 | Stop reason: HOSPADM

## 2024-09-11 RX ORDER — SODIUM CHLORIDE, SODIUM LACTATE, POTASSIUM CHLORIDE, CALCIUM CHLORIDE 600; 310; 30; 20 MG/100ML; MG/100ML; MG/100ML; MG/100ML
INJECTION, SOLUTION INTRAVENOUS CONTINUOUS
Status: DISCONTINUED | OUTPATIENT
Start: 2024-09-11 | End: 2024-09-11 | Stop reason: HOSPADM

## 2024-09-11 RX ORDER — EPHEDRINE SULFATE 50 MG/ML
5 INJECTION, SOLUTION INTRAVENOUS
Status: DISCONTINUED | OUTPATIENT
Start: 2024-09-11 | End: 2024-09-11 | Stop reason: HOSPADM

## 2024-09-11 RX ORDER — HYDROMORPHONE HYDROCHLORIDE 1 MG/ML
1 INJECTION, SOLUTION INTRAMUSCULAR; INTRAVENOUS; SUBCUTANEOUS
Status: DISCONTINUED | OUTPATIENT
Start: 2024-09-11 | End: 2024-09-17 | Stop reason: HOSPADM

## 2024-09-11 RX ADMIN — SODIUM CHLORIDE, POTASSIUM CHLORIDE, SODIUM LACTATE AND CALCIUM CHLORIDE: 600; 310; 30; 20 INJECTION, SOLUTION INTRAVENOUS at 12:38

## 2024-09-11 RX ADMIN — ONDANSETRON 4 MG: 2 INJECTION INTRAMUSCULAR; INTRAVENOUS at 01:49

## 2024-09-11 RX ADMIN — POLYETHYLENE GLYCOL 3350 1 PACKET: 17 POWDER, FOR SOLUTION ORAL at 18:06

## 2024-09-11 RX ADMIN — MAGNESIUM HYDROXIDE 30 ML: 1200 LIQUID ORAL at 16:30

## 2024-09-11 RX ADMIN — FENTANYL CITRATE 50 MCG: 50 INJECTION, SOLUTION INTRAMUSCULAR; INTRAVENOUS at 12:55

## 2024-09-11 RX ADMIN — ESMOLOL HYDROCHLORIDE 20 MG: 100 INJECTION, SOLUTION INTRAVENOUS at 13:14

## 2024-09-11 RX ADMIN — MIDAZOLAM HYDROCHLORIDE 1.5 MG: 2 INJECTION, SOLUTION INTRAMUSCULAR; INTRAVENOUS at 12:36

## 2024-09-11 RX ADMIN — ROCURONIUM BROMIDE 40 MG: 50 INJECTION, SOLUTION INTRAVENOUS at 12:43

## 2024-09-11 RX ADMIN — LIDOCAINE HYDROCHLORIDE 50 MG: 20 INJECTION, SOLUTION EPIDURAL; INFILTRATION; INTRACAUDAL at 12:43

## 2024-09-11 RX ADMIN — HYDROMORPHONE HYDROCHLORIDE 4 MG: 4 TABLET ORAL at 11:47

## 2024-09-11 RX ADMIN — SENNOSIDES AND DOCUSATE SODIUM 2 TABLET: 50; 8.6 TABLET ORAL at 18:06

## 2024-09-11 RX ADMIN — HYDROMORPHONE HYDROCHLORIDE 4 MG: 4 TABLET ORAL at 16:30

## 2024-09-11 RX ADMIN — ONDANSETRON 4 MG: 2 INJECTION INTRAMUSCULAR; INTRAVENOUS at 19:55

## 2024-09-11 RX ADMIN — ONDANSETRON 4 MG: 2 INJECTION INTRAMUSCULAR; INTRAVENOUS at 17:07

## 2024-09-11 RX ADMIN — ONDANSETRON 4 MG: 2 INJECTION INTRAMUSCULAR; INTRAVENOUS at 08:50

## 2024-09-11 RX ADMIN — HYDROMORPHONE HYDROCHLORIDE 6 MG: 4 TABLET ORAL at 19:56

## 2024-09-11 RX ADMIN — ESMOLOL HYDROCHLORIDE 20 MG: 100 INJECTION, SOLUTION INTRAVENOUS at 12:58

## 2024-09-11 RX ADMIN — ONDANSETRON 4 MG: 2 INJECTION INTRAMUSCULAR; INTRAVENOUS at 13:17

## 2024-09-11 RX ADMIN — HYDROMORPHONE HYDROCHLORIDE 4 MG: 4 TABLET ORAL at 01:44

## 2024-09-11 RX ADMIN — HYDROMORPHONE HYDROCHLORIDE 4 MG: 4 TABLET ORAL at 07:28

## 2024-09-11 RX ADMIN — EPHEDRINE SULFATE 15 MG: 50 INJECTION, SOLUTION INTRAVENOUS at 12:49

## 2024-09-11 RX ADMIN — PROPOFOL 100 MG: 10 INJECTION, EMULSION INTRAVENOUS at 12:43

## 2024-09-11 RX ADMIN — HYDROMORPHONE HYDROCHLORIDE 1 MG: 1 INJECTION, SOLUTION INTRAMUSCULAR; INTRAVENOUS; SUBCUTANEOUS at 08:50

## 2024-09-11 RX ADMIN — SODIUM CHLORIDE: 9 INJECTION, SOLUTION INTRAVENOUS at 04:27

## 2024-09-11 RX ADMIN — FENTANYL CITRATE 50 MCG: 50 INJECTION, SOLUTION INTRAMUSCULAR; INTRAVENOUS at 12:43

## 2024-09-11 RX ADMIN — SUGAMMADEX 200 MG: 100 INJECTION, SOLUTION INTRAVENOUS at 13:17

## 2024-09-11 RX ADMIN — PANTOPRAZOLE SODIUM 40 MG: 40 INJECTION, POWDER, FOR SOLUTION INTRAVENOUS at 14:41

## 2024-09-11 ASSESSMENT — PAIN DESCRIPTION - PAIN TYPE
TYPE: SURGICAL PAIN
TYPE: ACUTE PAIN
TYPE: ACUTE PAIN
TYPE: SURGICAL PAIN
TYPE: ACUTE PAIN
TYPE: ACUTE PAIN

## 2024-09-11 ASSESSMENT — PAIN SCALES - GENERAL: PAIN_LEVEL: 5

## 2024-09-11 NOTE — CARE PLAN
The patient is Watcher - Medium risk of patient condition declining or worsening    Shift Goals  Clinical Goals: pain control  Patient Goals: pain control and comfort  Family Goals: pain control    Progress made toward(s) clinical / shift goals:    A/OX4, Pt is able to understand plan of care at this time.  Problem: Knowledge Deficit - Standard  Goal: Patient and family/care givers will demonstrate understanding of plan of care, disease process/condition, diagnostic tests and medications  Outcome: Progressing  Note: Pt educated on when hgb is too low and when transfusion is needed. Pt is aware that her hemoglobin is below 7 and that she is a candidate for a  blood transfusion. Pt refuses at this time.        Patient is not progressing towards the following goals:      Problem: Pain - Standard  Goal: Alleviation of pain or a reduction in pain to the patient’s comfort goal  Outcome: Not Met  Note: Pt has sever pain usually rating it an 8/10. PRN pain medication given frequently.

## 2024-09-11 NOTE — ASSESSMENT & PLAN NOTE
Stable.  Patient with hemoglobin drop from 7.5-6.6 9/11, patient had symptoms of weakness.  Transfused with resolution, 7.8 on 9/13, 7.3 on 9/14.    Plan  -Continue to monitor hemoglobin with a.m. CBC transfuse hemoglobin less than 7

## 2024-09-11 NOTE — ANESTHESIA TIME REPORT
Anesthesia Start and Stop Event Times       Date Time Event    9/11/2024 1206 Ready for Procedure     1238 Anesthesia Start     1327 Anesthesia Stop          Responsible Staff  09/11/24      Name Role Begin End    Zachariah Gilmore D.O. Anesth 1238 1327          Overtime Reason:  no overtime (within assigned shift)    Comments:

## 2024-09-11 NOTE — DISCHARGE PLANNING
Care Transition Team Assessment    Patient is a 64 year old female admitted for abdominal pain. Please see patient's H&P for prior medical history. RNCM met with patient at bedside to complete assessment. Patient is A/Ox4 and able to verify information on the face sheet. Patient lives with her spouse and daughter in a 2 story house with 3 steps to enter, Armin Wilson (p)[phone]; emergency contact and DPOA. No advanced directive on file; patient reports advanced directive is back in [place] but appoints her spouse as her DPOA. Patient reports, prior to admission patient is independent with ADL's and IADL's. Patient does notu se any DME at baseline Patient reported her spouse and daughter are good support for her. Patient currently does not work. The patient does not have a PCP. Patient's preferred pharmacy is [facility] upon discharge. Patient denies a history of SNF/IPR nor HHC use in the past. Patient denies any SA or MH concerns. Patient confirmed medical coverage via [employer]. Patient has means to transportation and spouse will provide transport once medically stable for discharge. Patient's primary oncologist is Dr. Chaz Isabel. Patient also sees Dr. Kaz Greenfield which is alternative medicine.     Information Source  Orientation Level: Oriented X4  Information Given By: Patient  Who is responsible for making decisions for patient? : Patient    Readmission Evaluation  Is this a readmission?: Yes - planned readmission    Elopement Risk  Legal Hold: No  Ambulatory or Self Mobile in Wheelchair: Yes  Disoriented: No  Psychiatric Symptoms: None  History of Wandering: No  Elopement this Admit: No  Vocalizing Wanting to Leave: No  Displays Behaviors, Body Language Wanting to Leave: No-Not at Risk for Elopement  Elopement Risk: Not at Risk for Elopement         Discharge Preparedness  What is your plan after discharge?: Home with help  What are your discharge supports?: Spouse, Child  Prior  Functional Level: Ambulatory, Independent with Activities of Daily Living, Independent with Medication Management  Difficulity with ADLs: None  Difficulity with IADLs: None    Functional Assesment  Prior Functional Level: Ambulatory, Independent with Activities of Daily Living, Independent with Medication Management    Finances  Financial Barriers to Discharge: No  Prescription Coverage: Yes    Vision / Hearing Impairment  Right Eye Vision: Impaired, Wears Glasses  Left Eye Vision: Impaired, Wears Glasses    Advance Directive  Advance Directive?: DPOA for Health Care  Durable Power of  Name and Contact : Armin Wilson [phone]    Domestic Abuse  Have you ever been the victim of abuse or violence?: No  Possible Abuse/Neglect Reported to:: Not Applicable    Psychological Assessment  History of Substance Abuse: None  History of Psychiatric Problems: No  Non-compliant with Treatment: No  Newly Diagnosed Illness: No    Discharge Risks or Barriers  Discharge risks or barriers?: No PCP  Patient risk factors: No PCP    Anticipated Discharge Information  Discharge Disposition: Discharged to home/self care (01)

## 2024-09-11 NOTE — INTERVAL H&P NOTE
MRCP abnormal  Pt is anemic.  I recommend EGD and ERCP for stent revision  The risk, benefits, and alternatives were discussed in detail. Risks include acute pancreatitis, bowel perforation, procedure related bleeding event, infection, inability to safely complete the exam, sedation related complications. The patient, understanding the discussion, consents to proceed forward.        
Scribe Attestation (For Scribes USE Only)...

## 2024-09-11 NOTE — PROGRESS NOTES
[facility] MEDICINE PROGRESS NOTE        Attending:   Darrell Roa MD    Resident:   Ila Pacheco D.O.    PATIENT:   Bruce Wilson; [id number]; 1960    ID:   64 y.o. female admitted for  pain control in the setting of stage IV pancreatic cancer and possible biliary stent obstruction     SUBJECTIVE:   No acute events overnight, patient reports she slept much better overnight after being moved into a rescue tower.  Patient also states that she has had mildly better control of her pain but would like to increase Dilaudid to 6 mg as she feels this may cover her pain better for a longer period of time.    OBJECTIVE:  Vitals:    09/11/24 1330 09/11/24 1345 09/11/24 1400 09/11/24 1415   BP: 122/70 125/69 (!) 142/71 (!) 140/75   Pulse: 95 84 91 90   Resp: 12 12 14 20   Temp:       TempSrc:       SpO2: 100% 100% 96% 94%   Weight:       Height:         No intake or output data in the 24 hours ending 09/11/24 1256    PHYSICAL EXAM:  General: Chronically ill-appearing, no acute distress, afebrile, resting comfortably  HEENT: NC/AT. EOMI.   Cardiovascular: RRR without murmurs. Normal capillary refill   Respiratory: CTAB  Abdomen: soft, nontender, nondistended, no masses  EXT:  BOB, no edema  Skin: Slightly jaundiced appearance no erythema/lesions   Neuro: Non-focal    LABS:  Recent Labs     09/09/24  1830 09/10/24  0240 09/11/24  0040   WBC 5.1 4.6* 4.7*   RBC 2.86* 2.77* 2.35*   HEMOGLOBIN 7.9* 7.5* 6.6*   HEMATOCRIT 24.3* 23.8* 20.1*   MCV 85.0 85.9 85.5   MCH 27.6 27.1 28.1   RDW 75.5* 77.6* 80.6*   PLATELETCT 351 335 241   MPV 9.2 9.9 9.2   NEUTSPOLYS 72.20* 60.30 65.70   LYMPHOCYTES 13.90* 16.40* 12.10*   MONOCYTES 11.30 18.90* 17.80*   EOSINOPHILS 0.80 1.30 1.70   BASOPHILS 0.90 1.10 0.60   RBCMORPHOLO Present  --   --      Recent Labs     09/09/24  1830 09/10/24  0240 09/11/24  0040   SODIUM 134* 136 133*   POTASSIUM 4.2 4.2 4.0   CHLORIDE 98 101 100   CO2 22 21 24   BUN 13 11 8   CREATININE 0.20* 0.29*  "0.44*   CALCIUM 9.5 9.2 8.6   MAGNESIUM 1.9  --   --    PHOSPHORUS  --  3.7  --    ALBUMIN 3.4 3.3 2.9*     Estimated GFR/CRCL = CrCl cannot be calculated (Unknown ideal weight.).  Recent Labs     09/09/24  1830 09/10/24  0240 09/11/24  0040   GLUCOSE 106* 98 106*     Recent Labs     09/09/24 1830 09/10/24  0240 09/11/24  0040   ASTSGOT 113* 93* 91*   ALTSGPT 120* 107* 93*   TBILIRUBIN 7.3* 7.6* 7.0*   IBILIRUBIN 1.2*  --   --    DBILIRUBIN 6.1*  --   --    ALKPHOSPHAT 2226* 2051* 1925*   GLOBULIN 3.5 3.3 3.0             No results for input(s): \"INR\", \"APTT\", \"FIBRINOGEN\" in the last 72 hours.    Invalid input(s): \"DIMER\"      IMAGING:  [id number] STENT - TUBE   Final Result      Fluoroscopic guidance provided for ERCP. Total fluoroscopy time used was 1 minute and 5 seconds.      MR-ABDOMEN-WITH & W/O   Final Result      1.  Poor definition of pancreatic head mass.   2.  Artifact from biliary stent present.  Enhancing soft tissue at the proximal aspect concerning for tumor occlusion of the stent.   3.  Mild intrahepatic biliary dilation.   4.  Gas in the gallbladder, likely secondary to stent.   5.  Multiple liver lesions, primarily in the RIGHT lobe, most likely indicating metastasis.   6.  Small volume ascites.            OUTSIDE IMAGES-CT ABDOMEN /PELVIS   Final Result          MEDS:  Current Facility-Administered Medications   Medication Last Admin    pantoprazole (Protonix) injection 40 mg 40 mg at 09/11/24 1441    IOHEXOL 300 MG/ML INJ SOLN      HYDROmorphone (Dilaudid) tablet 4 mg      Or    HYDROmorphone (Dilaudid) tablet 6 mg      Or    HYDROmorphone (Dilaudid) injection 1 mg      senna-docusate (Pericolace Or Senokot S) 8.6-50 MG per tablet 2 Tablet      And    polyethylene glycol/lytes (Miralax) Packet 1 Packet      magnesium hydroxide (Milk Of Magnesia) suspension 30 mL      Pharmacy Consult Request ...Pain Management Review 1 Each      ondansetron (Zofran) syringe/vial injection 4 mg 4 mg at " 09/11/24 0850    NS infusion New Bag at 09/11/24 9760    Pharmacy Consult Request ...Pain Management Review 1 Each         ASSESSMENT/PLAN:    * Abdominal pain  Assessment & Plan  MRCP indicated Artifact from biliary stent present. Enhancing soft tissue at the proximal aspect concerning for tumor occlusion of the stent.  GI recommended patient proceed with ERCP to help resolve this occlusion.  Patient and has been requested increase of Dilaudid pain medication as the 4 mg dose only covers patient for 0.5 hours.  Would appreciate increasing to 6 mg oral to see if this will help cover her pain better.    Plan:  - Hydromorphone 4 to 6 mg every 3 hours as needed  -Zofran 4 mg every 3 hours as needed for nausea  -Follow-up GI recs after ERCP 9/11    -Continue to monitor bilirubin levels    Anemia  Assessment & Plan  Patient with hemoglobin drop from 7.5-6.6 overnight.  Patient with symptoms of weakness.  Could be in the setting of chronic iron deficiency anemia secondary to malignancy.  Discussed risk and benefits of transfusion with patient and has been and they agree to transfusion of 1 PRBC 9/11.    Plan  - CTM hemoglobin with evening draw of H&H at 5 PM 9/11 and 5AM 9/12  -Iron studies ordered      Pancreatic cancer metastasized to liver (HCC)- (present on admission)  Assessment & Plan  Metastatic pancreatic cancer with lesions to the liver, lungs and possible retroperitoneal involvement per CT abdomen on admission.  Patient is s/p biliary stent placement with ongoing chemotherapy treatment.  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  MRCP with findings of enhancing soft tissue at proximal aspect of biliary stent concerning for tumor occlusion.  GI will take patient for ERCP 9/11.    Plan:  - Continue to follow GI recs  - Pain management  - MIVF  -Patient does have pain with eating, which has been improved with Creon and Nexium in the past.  Will start  patient on IV Protonix while inpatient.  Patient also takes Carafate which we will hold pending ERCP 9/11.    Moderate malnutrition (HCC)  Assessment & Plan  Per ASPEN guidelines.         Core Measures  IV Fluids: 100 cc/h  Antbx: None  DVT ppx:  SCDs  Code status: Full  Dispo: inpatient    Ila Pacheco, DO  PGY-2, [facility]

## 2024-09-11 NOTE — PROGRESS NOTES
At about 0100 the pt and  where notified on pt hemoglobin being 6.6. This RN educated pt and  on blood transfusion. Pt and  denied transfusion at this time.  This RN collected COD just incase the pt changes their mind.

## 2024-09-11 NOTE — ANESTHESIA PREPROCEDURE EVALUATION
Case: [id number] Date/Time: 09/11/24 1255    Procedures:       ERCP (ENDOSCOPIC RETROGRADE CHOLANGIOPANCREATOGRAPHY)      GASTROSCOPY    Pre-op diagnosis: Jaundice    Location: [facility] ROOM 26 / SURGERY SAME DAY [facility]    Surgeons: Eugenio Osorio M.D.            Relevant Problems   No relevant active problems       Physical Exam    Airway   Mallampati: II  TM distance: >3 FB  Neck ROM: full       Cardiovascular - normal exam  Rhythm: regular  Rate: normal  (-) murmur     Dental - normal exam           Pulmonary - normal exam  Breath sounds clear to auscultation     Abdominal    Neurological - normal exam                   Anesthesia Plan    ASA 2       Plan - general       Airway plan will be ETT          Induction: intravenous    Postoperative Plan: Postoperative administration of opioids is intended.    Pertinent diagnostic labs and testing reviewed    Informed Consent:    Anesthetic plan and risks discussed with patient.    Use of blood products discussed with: patient whom consented to blood products.

## 2024-09-11 NOTE — OR NURSING
1324-Pt arrive to PACU from ENDO on 6L O2 via mask with OPA. Report received from Endo RN and anesthesia. Pt jaundiced. Pt port accessed to left upper chest    1335-Dr Osorio to bedside, pt remains asleep    1345-Pt opening eyes, OPA dc.    1351-Pt to RA, sips of water given. Pt denies pain or nausea.    1357-Report to floor [facility]. All questions answered.Pt back to sleep.    1408-Family to bedside. Pt remains sleeping    1419-Pt off floor via transport with family. All belongings with pt.

## 2024-09-11 NOTE — ANESTHESIA POSTPROCEDURE EVALUATION
Patient: Bruce Wilson    Procedure Summary       Date: 09/11/24 Room / Location: [facility] ROOM 26 / SURGERY SAME DAY [facility]    Anesthesia Start: 1238 Anesthesia Stop: 1327    Procedures:       ERCP (ENDOSCOPIC RETROGRADE CHOLANGIOPANCREATOGRAPHY) (Esophagus)      GASTROSCOPY (Esophagus)      GASTROSCOPY, WITH BALLOON DILATION (Esophagus)      INSERTION, STENT, BILE DUCT (Esophagus)      REMOVAL, STENT, ENDOSCOPIC (Esophagus) Diagnosis: (Jaundice)    Surgeons: Eugenio Osorio M.D. Responsible Provider: Zachariah Gilmore D.O.    Anesthesia Type: general ASA Status: 2            Final Anesthesia Type: general  Last vitals  BP   Blood Pressure: 120/70    Temp   37.2 °C (99 °F)    Pulse   92   Resp   16    SpO2   100 %      Anesthesia Post Evaluation    Patient location during evaluation: PACU  Patient participation: complete - patient participated  Level of consciousness: awake and alert  Pain score: 5    Airway patency: patent  Anesthetic complications: no  Cardiovascular status: hemodynamically stable  Respiratory status: acceptable  Hydration status: euvolemic    PONV: none          No notable events documented.     Nurse Pain Score: 9 (NPRS)

## 2024-09-11 NOTE — CARE PLAN
Problem: Pain - Standard  Goal: Alleviation of pain or a reduction in pain to the patient’s comfort goal  Outcome: Progressing     Problem: Knowledge Deficit - Standard  Goal: Patient and family/care givers will demonstrate understanding of plan of care, disease process/condition, diagnostic tests and medications  Outcome: Progressing     The patient is Watcher - Medium risk of patient condition declining or worsening    Shift Goals  Clinical Goals: pain control  Patient Goals: pain control and comfort  Family Goals: pain control    Progress made toward(s) clinical / shift goals:  Pt medicated per MAR    Patient is not progressing towards the following goals:

## 2024-09-11 NOTE — OP REPORT
PreOp Diagnosis: Jaundice, Pancreatic cancer, Biliary stent dysfunction    PostOp Diagnosis:   -Antral telangiectasias, diminutive.  Favor radiation effect.  Nonbleeding.  -Ulcer at the duodenal apex with stenosis, dilated with TTS balloon to 13.5 mm.  -Biliary stent removal with replacement (10 mm x 4 cm fully covered self-expanding metallic stent)    Procedure(s):  ERCP (ENDOSCOPIC RETROGRADE CHOLANGIOPANCREATOGRAPHY) - Wound Class: Clean Contaminated  GASTROSCOPY - Wound Class: Clean Contaminated  GASTROSCOPY, WITH BALLOON DILATION - Wound Class: Clean Contaminated  INSERTION, STENT, BILE DUCT - Wound Class: Clean Contaminated  REMOVAL, STENT, ENDOSCOPIC - Wound Class: Clean Contaminated    Surgeon(s):  Eugenio Osorio M.D.    Anesthesiologist/Type of Anesthesia:  Anesthesiologist: Zachariah Gilmore D.O./General    Surgical Staff:  Endoscopy Technician: Esequiel Dos Santos; Liza S Reyes  Radiology Technologist: Kyle Zamorano  Endoscopy Nurse: Antwon Durham R.N.      CONSENT: The risks, benefits and alternatives of the procedure were discussed in detail. The risks include and are not limited to bleeding, infection, perforation, missed lesions, and sedations risks (cardiopulmonary compromise and allergic reaction to medications).    DESCRIPTION:   The patient presented to the operating room.  A time out was performed prior to beginning the procedure.   The patient was placed in the supine position.   Patient was sedated by anesthesia: GETA.    OPERATIVE FINDINGS:  Esophagus: Normal.  Stomach: Normal with the exception of diminutive telangiectasias in the prepyloric region of the stomach.  Nonbleeding.  Favor radiation effect.  Duodenum: Stenosis at the duodenal apex with surrounding edema, erythema and ulceration.  Able to navigate into the second portion of the duodenum where a migrated fully covered self-expanding metallic stent was identified emanating from the ampulla  Using a TTS balloon under direct  visualization the duodenal apex was dilated sequentially to 13.5 mm.  Endoscope exchanged for duodenoscope.  Endoscope advanced to the second portion of the duodenum.  Stent removed with rat-tooth forceps.  Bile duct cannulated.  Cholangiogram demonstrated multiple intrahepatic stenoses consistent with metastatic disease.  Appropriate drainage under fluoroscopic guidance.  A 10 mm x 4 cm fully covered self-expanding metallic stent was deployed just distal to the bifurcation.  Well-positioned.    Blood loss: Minimal    The patient tolerated the procedure well.      There were no immediate complications.    IMPRESSION:  -Antral telangiectasias, diminutive.  Favor radiation effect.  Nonbleeding.  -Ulcer at the duodenal apex with stenosis, dilated with TTS balloon to 13.5 mm.  -Biliary stent removal with replacement (10 mm x 4 cm fully covered self-expanding metallic stent)    RECOMMENDATIONS:  Observe clinical course.  If bilirubin fails to normalize with this intervention consideration could be given to percutaneous drain placement.  Watch for complications.  Advance diet.  Return patient to hospital [facility] for continued care.

## 2024-09-11 NOTE — ANESTHESIA PROCEDURE NOTES
Airway    Date/Time: 9/11/2024 12:44 PM    Performed by: Zachariah Gilmore D.O.  Authorized by: Zachariah Gilmore D.O.    Location:  OR  Urgency:  Elective  Difficult Airway: No    Indications for Airway Management:  Anesthesia      Spontaneous Ventilation: absent    Sedation Level:  Deep  Preoxygenated: Yes    Patient Position:  Sniffing  Mask Difficulty Assessment:  2 - vent by mask + OA or adjuvant +/- NMBA  Final Airway Type:  Endotracheal airway  Final Endotracheal Airway:  ETT  Cuffed: Yes    Technique Used for Successful ETT Placement:  Direct laryngoscopy    Insertion Site:  Oral  Blade Type:  Khalif  Laryngoscope Blade/Videolaryngoscope Blade Size:  3  ETT Size (mm):  7.0  Measured from:  Teeth  ETT to Teeth (cm):  20  Placement Verified by: auscultation and capnometry    Cormack-Lehane Classification:  Grade I - full view of glottis  Number of Attempts at Approach:  1

## 2024-09-11 NOTE — DOCUMENTATION QUERY
[facility]                                                                       Query Response Note      PATIENT:               CHARLOTTE JORDAN  ACCT #:                  [id number]  MRN:                     [id number]  :                      1960  ADMIT DATE:       2024 5:13 PM  DISCH DATE:          RESPONDING  PROVIDER #:        [id number]           QUERY TEXT:    Per RD consult 09/10, pt meets ASPEN criteria for at least moderate malnutrition.     Please clarify the clinical/diagnostic relevance for the documented findings:    *Note:  If you agree with a diagnosis listed, please remember to include it in your daily concurrent documentation and onto the Discharge Summary    The patient's clinical indicators include:  Findings:  --Pt with stage 4 pancreatic CA with mets to the liver admitted for biliary stent obstruction  --Per H&P General:  Cachectic  --BMI:  17.9, weight:  44.4kg, height:  1.575m per Epic chart review  --Per RD consult 09/10, ''Consult received for BMI,19 and MST score >/=2 for 24-33lb wt loss x 3 months and poor PO''      documented  --Per RD consult 09/10, ''Pt reported that her UBW is 118lbs and that she lost 20lbs in April and then another 8lbs in August''      documented  --Per RD consult 09/10, ''NFPE: Weight change:  no chart review, Muscle Mass:  Mild-moderate loss. Pt w/temporal scooping      as well as prominence in clavicle area, Subcutaneous fat:  Mil-moderate loss, pt w/flat buccal fat pads'' documented  --Per RD consult 09/10, ''Per ASPEN criteria, pt meets at least moderate malnutrition in context of chronic illness related to      stage IV pancreatic cancer as evidenced by mild/moderate muscle wasting and moderate fat loss noted in NFPE''      documented    Treatment:  --RD consult  --Provide Ensure Plus tid per RD   --Monitor weight trends  --Currently NPO status    Risk Factors:  --Stage 4 pancreatic  cancer with mets to the liver  --24-33lb weight loss over last 3 months  --Cachexia    Thank you,  Lian Beach BSN, RN  Clinical   Connect via [employer] messenger  Options provided:   -- Patient has moderate protein-calorie malnutrition   -- Patient has mild protein-calorie malnutrition   -- Other explanation, (Please specify the other explanation)   -- Unable to determine      Query created by: Lian Beach on 9/11/2024 10:28 AM    RESPONSE TEXT:    Patient has moderate protein-calorie malnutrition          Electronically signed by:  HILARY IQBAL 9/11/2024 3:06 PM

## 2024-09-12 ENCOUNTER — APPOINTMENT (OUTPATIENT)
Dept: RADIOLOGY | Facility: MEDICAL CENTER | Age: 64
DRG: 445 | End: 2024-09-12
Payer: COMMERCIAL

## 2024-09-12 LAB
ALBUMIN SERPL BCP-MCNC: 2.8 G/DL (ref 3.2–4.9)
ALP SERPL-CCNC: 2041 U/L (ref 30–99)
ALT SERPL-CCNC: 90 U/L (ref 2–50)
ANION GAP SERPL CALC-SCNC: 11 MMOL/L (ref 7–16)
AST SERPL-CCNC: 97 U/L (ref 12–45)
BASOPHILS # BLD AUTO: 0.4 % (ref 0–1.8)
BASOPHILS # BLD: 0.02 K/UL (ref 0–0.12)
BILIRUB CONJ SERPL-MCNC: 8.1 MG/DL (ref 0.1–0.5)
BILIRUB INDIRECT SERPL-MCNC: 1.9 MG/DL (ref 0–1)
BILIRUB SERPL-MCNC: 10 MG/DL (ref 0.1–1.5)
BUN SERPL-MCNC: 5 MG/DL (ref 8–22)
CALCIUM SERPL-MCNC: 8.7 MG/DL (ref 8.5–10.5)
CHLORIDE SERPL-SCNC: 102 MMOL/L (ref 96–112)
CO2 SERPL-SCNC: 22 MMOL/L (ref 20–33)
CREAT SERPL-MCNC: 0.19 MG/DL (ref 0.5–1.4)
EOSINOPHIL # BLD AUTO: 0.17 K/UL (ref 0–0.51)
EOSINOPHIL NFR BLD: 3.6 % (ref 0–6.9)
ERYTHROCYTE [DISTWIDTH] IN BLOOD BY AUTOMATED COUNT: 74.4 FL (ref 35.9–50)
GFR SERPLBLD CREATININE-BSD FMLA CKD-EPI: 133 ML/MIN/1.73 M 2
GLUCOSE SERPL-MCNC: 87 MG/DL (ref 65–99)
HCT VFR BLD AUTO: 23.2 % (ref 37–47)
HGB BLD-MCNC: 7.7 G/DL (ref 12–16)
IMM GRANULOCYTES # BLD AUTO: 0.11 K/UL (ref 0–0.11)
IMM GRANULOCYTES NFR BLD AUTO: 2.3 % (ref 0–0.9)
LYMPHOCYTES # BLD AUTO: 0.5 K/UL (ref 1–4.8)
LYMPHOCYTES NFR BLD: 10.5 % (ref 22–41)
MCH RBC QN AUTO: 28.3 PG (ref 27–33)
MCHC RBC AUTO-ENTMCNC: 33.2 G/DL (ref 32.2–35.5)
MCV RBC AUTO: 85.3 FL (ref 81.4–97.8)
MONOCYTES # BLD AUTO: 0.84 K/UL (ref 0–0.85)
MONOCYTES NFR BLD AUTO: 17.7 % (ref 0–13.4)
NEUTROPHILS # BLD AUTO: 3.11 K/UL (ref 1.82–7.42)
NEUTROPHILS NFR BLD: 65.5 % (ref 44–72)
NRBC # BLD AUTO: 0 K/UL
NRBC BLD-RTO: 0 /100 WBC (ref 0–0.2)
PLATELET # BLD AUTO: 222 K/UL (ref 164–446)
PMV BLD AUTO: 9.3 FL (ref 9–12.9)
POTASSIUM SERPL-SCNC: 3.9 MMOL/L (ref 3.6–5.5)
PROT SERPL-MCNC: 5.7 G/DL (ref 6–8.2)
RBC # BLD AUTO: 2.72 M/UL (ref 4.2–5.4)
SODIUM SERPL-SCNC: 135 MMOL/L (ref 135–145)
WBC # BLD AUTO: 4.8 K/UL (ref 4.8–10.8)

## 2024-09-12 PROCEDURE — 85025 COMPLETE CBC W/AUTO DIFF WBC: CPT

## 2024-09-12 PROCEDURE — 700111 HCHG RX REV CODE 636 W/ 250 OVERRIDE (IP)

## 2024-09-12 PROCEDURE — 700105 HCHG RX REV CODE 258

## 2024-09-12 PROCEDURE — 700102 HCHG RX REV CODE 250 W/ 637 OVERRIDE(OP)

## 2024-09-12 PROCEDURE — 770004 HCHG ROOM/CARE - ONCOLOGY PRIVATE *

## 2024-09-12 PROCEDURE — 74018 RADEX ABDOMEN 1 VIEW: CPT

## 2024-09-12 PROCEDURE — 99232 SBSQ HOSP IP/OBS MODERATE 35: CPT | Performed by: NURSE PRACTITIONER

## 2024-09-12 PROCEDURE — A9270 NON-COVERED ITEM OR SERVICE: HCPCS

## 2024-09-12 PROCEDURE — 99232 SBSQ HOSP IP/OBS MODERATE 35: CPT | Mod: GC | Performed by: FAMILY MEDICINE

## 2024-09-12 PROCEDURE — 80076 HEPATIC FUNCTION PANEL: CPT

## 2024-09-12 PROCEDURE — 80048 BASIC METABOLIC PNL TOTAL CA: CPT

## 2024-09-12 RX ORDER — LACTULOSE 10 G/15ML
30 SOLUTION ORAL 3 TIMES DAILY
Status: DISCONTINUED | OUTPATIENT
Start: 2024-09-12 | End: 2024-09-12

## 2024-09-12 RX ADMIN — SODIUM CHLORIDE: 9 INJECTION, SOLUTION INTRAVENOUS at 20:04

## 2024-09-12 RX ADMIN — HYDROMORPHONE HYDROCHLORIDE 6 MG: 4 TABLET ORAL at 07:19

## 2024-09-12 RX ADMIN — POLYETHYLENE GLYCOL 3350 1 PACKET: 17 POWDER, FOR SOLUTION ORAL at 05:38

## 2024-09-12 RX ADMIN — MAGNESIUM HYDROXIDE 30 ML: 1200 LIQUID ORAL at 05:27

## 2024-09-12 RX ADMIN — SENNOSIDES AND DOCUSATE SODIUM 2 TABLET: 50; 8.6 TABLET ORAL at 17:52

## 2024-09-12 RX ADMIN — LACTULOSE 30 ML: 10 SOLUTION ORAL at 09:12

## 2024-09-12 RX ADMIN — PANCRELIPASE 24000 UNITS: 120000; 24000; 76000 CAPSULE, DELAYED RELEASE PELLETS ORAL at 17:53

## 2024-09-12 RX ADMIN — HYDROMORPHONE HYDROCHLORIDE 6 MG: 4 TABLET ORAL at 14:05

## 2024-09-12 RX ADMIN — PANTOPRAZOLE SODIUM 40 MG: 40 INJECTION, POWDER, FOR SOLUTION INTRAVENOUS at 05:27

## 2024-09-12 RX ADMIN — ONDANSETRON 4 MG: 2 INJECTION INTRAMUSCULAR; INTRAVENOUS at 00:35

## 2024-09-12 RX ADMIN — SODIUM CHLORIDE: 9 INJECTION, SOLUTION INTRAVENOUS at 00:35

## 2024-09-12 RX ADMIN — HYDROMORPHONE HYDROCHLORIDE 6 MG: 4 TABLET ORAL at 00:30

## 2024-09-12 RX ADMIN — HYDROMORPHONE HYDROCHLORIDE 1 MG: 1 INJECTION, SOLUTION INTRAMUSCULAR; INTRAVENOUS; SUBCUTANEOUS at 16:13

## 2024-09-12 ASSESSMENT — ENCOUNTER SYMPTOMS
BLURRED VISION: 0
WEAKNESS: 1
CONSTIPATION: 1
HEARTBURN: 0
FEVER: 0
DEPRESSION: 0
VOMITING: 0
NAUSEA: 0
CHILLS: 0
COUGH: 0
DIARRHEA: 0
BACK PAIN: 0
DIZZINESS: 0
BLOOD IN STOOL: 0
SHORTNESS OF BREATH: 0
ABDOMINAL PAIN: 1

## 2024-09-12 ASSESSMENT — PAIN DESCRIPTION - PAIN TYPE
TYPE: ACUTE PAIN

## 2024-09-12 NOTE — CARE PLAN
The patient is Watcher - Medium risk of patient condition declining or worsening    Shift Goals  Clinical Goals: pain control, monitor labs  Patient Goals: pain control  Family Goals: pain control    Progress made toward(s) clinical / shift goals:  Pt A/Ox4 and understands plan of care.   Pt blood was drawn at 0300. Not all the labs have resulted. Total bilirubin is still pending.   Problem: Pain - Standard  Goal: Alleviation of pain or a reduction in pain to the patient’s comfort goal  Outcome: Progressing  Note: Pt says she find new PRN pain regimen helpful.   Pt has called less today for PRN's as compared to last night.   Pt is continues to receive zofran to help with nauseas from the dilaudid.        Patient is not progressing towards the following goals:

## 2024-09-12 NOTE — PROGRESS NOTES
..Gastroenterology Progress Note               Author:  Janina Mosquera, AUGUSTINE,  APRN Date & Time Created: 9/12/2024 7:20 AM       Patient ID:  Name:             Bruce Wilson  YOB: 1960  Age:                 64 y.o.  female  MRN:               [id number]      Medical Decision Making, by Problem:  Active Hospital Problems    Diagnosis     Anemia [D64.9]     Abdominal pain [R10.9]     Moderate malnutrition (HCC) [E44.0]     Pancreatic cancer metastasized to liver (HCC) [C25.9, C78.7]      Presenting Chief Complaint:  Jaundice     History of Present Illness:   64-year-old female transferred from [facility] for jaundice.  Patient has a history of pancreatic cancer.  She was in the area visiting with an oncologist who was going to pursue a nontraditional treatment for her pancreatic cancer as best I understand.  She was noticed to be jaundiced.  She went to [facility].  She was transferred here.  Patient has had a metal stent placed in her bile duct.  It is unclear whether this was covered or uncovered.  This was done in [place].  Her  may have records related to that.  Patient denies fevers.  ROS per admitting HPI otherwise.  No changes.     The patient had an EGD done on 8/12/2024.  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    9/11/2024: ERCP  IMPRESSION:  -Antral telangiectasias, diminutive.  Favor radiation effect.  Nonbleeding.  -Ulcer at the duodenal apex with stenosis, dilated with TTS balloon to 13.5 mm.  -Biliary stent removal with replacement (10 mm x 4 cm fully covered self-expanding metallic stent)    Interval History:  9/12/2024: Patient seen in collaboration with the primary team,  also at bedside.  Continues to complain of constipation and fatigue and does not feel ready to go home.  Pain controlled on current regimen.  Discussed ERCP findings and current lab results.  AST/ALT minimally decreased, alk phos and T. Bili uptrending now 10>7    Hospital Medications:  Current  Facility-Administered Medications   Medication Dose Frequency Provider Last Rate Last Admin    pantoprazole (Protonix) injection 40 mg  40 mg DAILY HANS VillatoroOShaista   40 mg at 09/12/24 0527    HYDROmorphone (Dilaudid) tablet 4 mg  4 mg Q3HRS PRN SILVIA Villatoro.O.   4 mg at 09/11/24 1630    Or    HYDROmorphone (Dilaudid) tablet 6 mg  6 mg Q3HRS PRN SILVIA Villatoro.O.   6 mg at 09/12/24 0719    Or    HYDROmorphone (Dilaudid) injection 1 mg  1 mg Q3HRS PRN SILVIA Villatoro.O.        senna-docusate (Pericolace Or Senokot S) 8.6-50 MG per tablet 2 Tablet  2 Tablet Q EVENING SILVIA Villatoro.O.   2 Tablet at 09/11/24 1806    And    polyethylene glycol/lytes (Miralax) Packet 1 Packet  1 Packet BID HANS VillatoroO.   1 Packet at 09/12/24 0538    magnesium hydroxide (Milk Of Magnesia) suspension 30 mL  30 mL DAILY HANS VillatoroO.   30 mL at 09/12/24 0527    Pharmacy Consult Request ...Pain Management Review 1 Each  1 Each PHARMACY TO DOSE Ila Pacheco D.O.        ondansetron (Zofran) syringe/vial injection 4 mg  4 mg Q3HRS PRN SILVIA Villatoro.O.   4 mg at 09/12/24 0035    NS infusion   Continuous Yovani Carrillo M.D. 100 mL/hr at 09/12/24 0035 New Bag at 09/12/24 0035    Pharmacy Consult Request ...Pain Management Review 1 Each  1 Each PHARMACY TO DOSE Yovani Carrillo M.D.       Last reviewed on 9/11/2024 11:10 AM by Zachariah Gilmore D.O.       Review of Systems:  Review of Systems   Constitutional:  Positive for malaise/fatigue. Negative for chills and fever.   HENT:  Negative for hearing loss.    Eyes:  Negative for blurred vision.   Respiratory:  Negative for cough and shortness of breath.    Cardiovascular:  Negative for chest pain and leg swelling.   Gastrointestinal:  Positive for abdominal pain and constipation. Negative for blood in stool, diarrhea, heartburn, melena, nausea and vomiting.   Genitourinary:  Negative for dysuria.  "  Musculoskeletal:  Negative for back pain.   Skin:  Negative for rash.   Neurological:  Positive for weakness. Negative for dizziness.   Psychiatric/Behavioral:  Negative for depression.    All other systems reviewed and are negative.    Vital signs:  Weight/BMI: Body mass index is 17.9 kg/m².  /70   Pulse 87   Temp 36.9 °C (98.5 °F) (Oral)   Resp 14   Ht 1.575 m (5' 2\")   Wt 44.4 kg (97 lb 14.2 oz)   SpO2 93%   Vitals:    09/11/24 1945 09/11/24 2107 09/11/24 2346 09/12/24 0536   BP: 127/70 (!) 140/74 125/67 121/70   Pulse: 84 92 88 87   Resp: 16 16 15 14   Temp: 37 °C (98.6 °F) 37.1 °C (98.8 °F) 36.9 °C (98.5 °F) 36.9 °C (98.5 °F)   TempSrc: Temporal Temporal Oral Oral   SpO2: 96% 97% 96% 93%   Weight:       Height:         Oxygen Therapy:  Pulse Oximetry: 93 %, O2 (LPM): 0, O2 Delivery Device: None - Room Air    Intake/Output Summary (Last 24 hours) at 9/12/2024 0720  Last data filed at 9/11/2024 1600  Gross per 24 hour   Intake 1432 ml   Output --   Net 1432 ml       Physical Exam  Vitals and nursing note reviewed.   Constitutional:       General: She is not in acute distress.     Appearance: She is ill-appearing.   HENT:      Head: Normocephalic and atraumatic.      Right Ear: External ear normal.      Left Ear: External ear normal.      Nose: Nose normal.      Mouth/Throat:      Mouth: Mucous membranes are moist.      Pharynx: Oropharynx is clear.   Eyes:      General: Scleral icterus present.   Cardiovascular:      Rate and Rhythm: Normal rate and regular rhythm.      Pulses: Normal pulses.      Heart sounds: Normal heart sounds.   Pulmonary:      Effort: Pulmonary effort is normal. No respiratory distress.      Breath sounds: Normal breath sounds.   Abdominal:      General: Abdomen is flat. Bowel sounds are normal. There is no distension.      Palpations: Abdomen is soft.      Tenderness: There is no abdominal tenderness.   Musculoskeletal:         General: Normal range of motion.      Cervical " back: Normal range of motion.   Skin:     General: Skin is warm and dry.      Capillary Refill: Capillary refill takes less than 2 seconds.      Coloration: Skin is jaundiced.   Neurological:      Mental Status: She is alert and oriented to person, place, and time.      Motor: Weakness present.   Psychiatric:         Mood and Affect: Mood normal.         Behavior: Behavior normal.       Labs:  Recent Labs     09/09/24  1830 09/10/24  0240 09/11/24  0040 09/12/24  0240   SODIUM 134* 136 133* 135   POTASSIUM 4.2 4.2 4.0 3.9   CHLORIDE 98 101 100 102   CO2 22 21 24 22   BUN 13 11 8 5*   CREATININE 0.20* 0.29* 0.44* 0.19*   MAGNESIUM 1.9  --   --   --    PHOSPHORUS  --  3.7  --   --    CALCIUM 9.5 9.2 8.6 8.7     Recent Labs     09/09/24  1830 09/10/24  0240 09/11/24  0040 09/12/24  0240   ALTSGPT 120* 107* 93* 90*   ASTSGOT 113* 93* 91* 97*   ALKPHOSPHAT 2226* 2051* 1925* 2041*   TBILIRUBIN 7.3* 7.6* 7.0* 10.0*   DBILIRUBIN 6.1*  --   --  8.1*   LIPASE 6*  --   --   --    GLUCOSE 106* 98 106* 87     Recent Labs     09/10/24  0240 09/11/24  0040 09/12/24  0240   WBC 4.6* 4.7* 4.8   NEUTSPOLYS 60.30 65.70 65.50   LYMPHOCYTES 16.40* 12.10* 10.50*   MONOCYTES 18.90* 17.80* 17.70*   EOSINOPHILS 1.30 1.70 3.60   BASOPHILS 1.10 0.60 0.40   ASTSGOT 93* 91* 97*   ALTSGPT 107* 93* 90*   ALKPHOSPHAT 2051* 1925* 2041*   TBILIRUBIN 7.6* 7.0* 10.0*     Recent Labs     09/10/24  0240 09/11/24  0040 09/11/24 1621 09/12/24  0240   RBC 2.77* 2.35*  --  2.72*   HEMOGLOBIN 7.5* 6.6* 8.2* 7.7*   HEMATOCRIT 23.8* 20.1* 24.9* 23.2*   PLATELETCT 335 241  --  222   IRON  --   --  40  --    FERRITIN  --   --  568.0*  --    TOTIRONBC  --   --  274  --      Recent Results (from the past 24 hour(s))   IRON/TOTAL IRON BIND    Collection Time: 09/11/24  4:21 PM   Result Value Ref Range    Iron 40 40 - 170 ug/dL    Total Iron Binding 274 250 - 450 ug/dL    Unsat Iron Binding 234 110 - 370 ug/dL    % Saturation 15 15 - 55 %   FERRITIN    Collection  Time: 09/11/24  4:21 PM   Result Value Ref Range    Ferritin 568.0 (H) 10.0 - 291.0 ng/mL   HEMOGLOBIN AND HEMATOCRIT    Collection Time: 09/11/24  4:21 PM   Result Value Ref Range    Hemoglobin 8.2 (L) 12.0 - 16.0 g/dL    Hematocrit 24.9 (L) 37.0 - 47.0 %   CBC WITH DIFFERENTIAL    Collection Time: 09/12/24  2:40 AM   Result Value Ref Range    WBC 4.8 4.8 - 10.8 K/uL    RBC 2.72 (L) 4.20 - 5.40 M/uL    Hemoglobin 7.7 (L) 12.0 - 16.0 g/dL    Hematocrit 23.2 (L) 37.0 - 47.0 %    MCV 85.3 81.4 - 97.8 fL    MCH 28.3 27.0 - 33.0 pg    MCHC 33.2 32.2 - 35.5 g/dL    RDW 74.4 (H) 35.9 - 50.0 fL    Platelet Count 222 164 - 446 K/uL    MPV 9.3 9.0 - 12.9 fL    Neutrophils-Polys 65.50 44.00 - 72.00 %    Lymphocytes 10.50 (L) 22.00 - 41.00 %    Monocytes 17.70 (H) 0.00 - 13.40 %    Eosinophils 3.60 0.00 - 6.90 %    Basophils 0.40 0.00 - 1.80 %    Immature Granulocytes 2.30 (H) 0.00 - 0.90 %    Nucleated RBC 0.00 0.00 - 0.20 /100 WBC    Neutrophils (Absolute) 3.11 1.82 - 7.42 K/uL    Lymphs (Absolute) 0.50 (L) 1.00 - 4.80 K/uL    Monos (Absolute) 0.84 0.00 - 0.85 K/uL    Eos (Absolute) 0.17 0.00 - 0.51 K/uL    Baso (Absolute) 0.02 0.00 - 0.12 K/uL    Immature Granulocytes (abs) 0.11 0.00 - 0.11 K/uL    NRBC (Absolute) 0.00 K/uL   Basic Metabolic Panel    Collection Time: 09/12/24  2:40 AM   Result Value Ref Range    Sodium 135 135 - 145 mmol/L    Potassium 3.9 3.6 - 5.5 mmol/L    Chloride 102 96 - 112 mmol/L    Co2 22 20 - 33 mmol/L    Glucose 87 65 - 99 mg/dL    Bun 5 (L) 8 - 22 mg/dL    Creatinine 0.19 (L) 0.50 - 1.40 mg/dL    Calcium 8.7 8.5 - 10.5 mg/dL    Anion Gap 11.0 7.0 - 16.0   ESTIMATED GFR    Collection Time: 09/12/24  2:40 AM   Result Value Ref Range    GFR (CKD-EPI) 133 >60 mL/min/1.73 m 2   HEPATIC FUNCTION PANEL    Collection Time: 09/12/24  2:40 AM   Result Value Ref Range    Alkaline Phosphatase 2041 (H) 30 - 99 U/L    AST(SGOT) 97 (H) 12 - 45 U/L    ALT(SGPT) 90 (H) 2 - 50 U/L    Total Bilirubin 10.0 (H) 0.1  - 1.5 mg/dL    Direct Bilirubin 8.1 (H) 0.1 - 0.5 mg/dL    Indirect Bilirubin 1.9 (H) 0.0 - 1.0 mg/dL    Albumin 2.8 (L) 3.2 - 4.9 g/dL    Total Protein 5.7 (L) 6.0 - 8.2 g/dL       Radiology Review:  RN-KEUD-PNEGYDZ STENT - TUBE   Final Result      Fluoroscopic guidance provided for ERCP. Total fluoroscopy time used was 1 minute and 5 seconds.      MR-ABDOMEN-WITH & W/O   Final Result      1.  Poor definition of pancreatic head mass.   2.  Artifact from biliary stent present.  Enhancing soft tissue at the proximal aspect concerning for tumor occlusion of the stent.   3.  Mild intrahepatic biliary dilation.   4.  Gas in the gallbladder, likely secondary to stent.   5.  Multiple liver lesions, primarily in the RIGHT lobe, most likely indicating metastasis.   6.  Small volume ascites.            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  After discussion with patient and her , they elect to remain inpatient and follow liver enzyme trends  KUB negative for ileus however still demonstrates stool burden   Continue twice daily MiraLAX, avoid lactulose as this can contribute to ileus and start Relistor for opioid-induced constipation   Encourage ambulation as tolerated    Plan discussed with patient  and her , primary team, Dr. Goodwin    ..Janina Mosquera, AUGUSTINE,  APRN    Core Quality Measures   Reviewed items::  Labs, Medications and Radiology reports reviewed

## 2024-09-12 NOTE — PROGRESS NOTES
[facility] MEDICINE PROGRESS NOTE        Attending:   Jarett Gomez MD    Resident:   Ila Pacheco D.O.    PATIENT:   Bruce Wilson; [id number]; 1960    ID:   64 y.o. female admitted for pain control in the setting of stage IV pancreatic cancer biliary stent obstruction now s/p ERCP with successful removal and replacement of stent    SUBJECTIVE:   No acute events overnight, reports feeling very constipated this a.m..  She is passing gas.  Patient states that her pain is still pretty severe even after taking 6 mg Dilaudid oral.      OBJECTIVE:  Vitals:    09/11/24 2107 09/11/24 2346 09/12/24 0536 09/12/24 0800   BP: (!) 140/74 125/67 121/70 (!) 141/75   Pulse: 92 88 87 89   Resp: 16 15 14 18   Temp: 37.1 °C (98.8 °F) 36.9 °C (98.5 °F) 36.9 °C (98.5 °F) 36.9 °C (98.4 °F)   TempSrc: Temporal Oral Oral Oral   SpO2: 97% 96% 93% 94%   Weight:       Height:           Intake/Output Summary (Last 24 hours) at 9/12/2024 1459  Last data filed at 9/11/2024 1600  Gross per 24 hour   Intake 480 ml   Output --   Net 480 ml       PHYSICAL EXAM:  General: Chronically ill-appearing, no acute distress, afebrile, resting comfortably  HEENT: NC/AT. EOMI.   Cardiovascular: RRR without murmurs. Normal capillary refill   Respiratory: CTAB  Abdomen: Mildly distended, tender throughout, no masses  EXT:  BOB, no edema  Skin: jaundiced appearance no erythema/lesions   Neuro: Non-focal    LABS:  Recent Labs     09/09/24  1830 09/10/24  0240 09/11/24  0040 09/11/24  1621 09/12/24  0240   WBC 5.1 4.6* 4.7*  --  4.8   RBC 2.86* 2.77* 2.35*  --  2.72*   HEMOGLOBIN 7.9* 7.5* 6.6* 8.2* 7.7*   HEMATOCRIT 24.3* 23.8* 20.1* 24.9* 23.2*   MCV 85.0 85.9 85.5  --  85.3   MCH 27.6 27.1 28.1  --  28.3   RDW 75.5* 77.6* 80.6*  --  74.4*   PLATELETCT 351 335 241  --  222   MPV 9.2 9.9 9.2  --  9.3   NEUTSPOLYS 72.20* 60.30 65.70  --  65.50   LYMPHOCYTES 13.90* 16.40* 12.10*  --  10.50*   MONOCYTES 11.30 18.90* 17.80*  --  17.70*   EOSINOPHILS 0.80  "1.30 1.70  --  3.60   BASOPHILS 0.90 1.10 0.60  --  0.40   RBCMORPHOLO Present  --   --   --   --      Recent Labs     09/09/24  1830 09/10/24  0240 09/11/24  0040 09/12/24  0240   SODIUM 134* 136 133* 135   POTASSIUM 4.2 4.2 4.0 3.9   CHLORIDE 98 101 100 102   CO2 22 21 24 22   BUN 13 11 8 5*   CREATININE 0.20* 0.29* 0.44* 0.19*   CALCIUM 9.5 9.2 8.6 8.7   MAGNESIUM 1.9  --   --   --    PHOSPHORUS  --  3.7  --   --    ALBUMIN 3.4 3.3 2.9* 2.8*     Estimated GFR/CRCL = CrCl cannot be calculated (Unknown ideal weight.).  Recent Labs     09/10/24  0240 09/11/24  0040 09/12/24  0240   GLUCOSE 98 106* 87     Recent Labs     09/09/24  1830 09/10/24  0240 09/11/24  0040 09/12/24  0240   ASTSGOT 113* 93* 91* 97*   ALTSGPT 120* 107* 93* 90*   TBILIRUBIN 7.3* 7.6* 7.0* 10.0*   IBILIRUBIN 1.2*  --   --  1.9*   DBILIRUBIN 6.1*  --   --  8.1*   ALKPHOSPHAT 2226* 2051* 1925* 2041*   GLOBULIN 3.5 3.3 3.0  --              No results for input(s): \"INR\", \"APTT\", \"FIBRINOGEN\" in the last 72 hours.    Invalid input(s): \"DIMER\"      IMAGING:  DC-ALZTSMR-3 VIEW   Final Result      1. Nonobstructive bowel gas pattern.   2. Interval decrease in amount of stool throughout the colon noted on the prior CT.   3. Biliary stent.      [id number] STENT - TUBE   Final Result      Fluoroscopic guidance provided for ERCP. Total fluoroscopy time used was 1 minute and 5 seconds.      MR-ABDOMEN-WITH & W/O   Final Result      1.  Poor definition of pancreatic head mass.   2.  Artifact from biliary stent present.  Enhancing soft tissue at the proximal aspect concerning for tumor occlusion of the stent.   3.  Mild intrahepatic biliary dilation.   4.  Gas in the gallbladder, likely secondary to stent.   5.  Multiple liver lesions, primarily in the RIGHT lobe, most likely indicating metastasis.   6.  Small volume ascites.            OUTSIDE IMAGES-CT ABDOMEN /PELVIS   Final Result          MEDS:  Current Facility-Administered Medications   Medication " Last Admin    Pancrelipase (Lip-Prot-Amyl) (Creon) [id number] units capsule 12,000-24,000 Units      pantoprazole (Protonix) injection 40 mg 40 mg at 09/12/24 0527    HYDROmorphone (Dilaudid) tablet 4 mg 4 mg at 09/11/24 1630    Or    HYDROmorphone (Dilaudid) tablet 6 mg 6 mg at 09/12/24 1405    Or    HYDROmorphone (Dilaudid) injection 1 mg      senna-docusate (Pericolace Or Senokot S) 8.6-50 MG per tablet 2 Tablet 2 Tablet at 09/11/24 1806    And    polyethylene glycol/lytes (Miralax) Packet 1 Packet 1 Packet at 09/12/24 0538    magnesium hydroxide (Milk Of Magnesia) suspension 30 mL 30 mL at 09/12/24 0527    Pharmacy Consult Request ...Pain Management Review 1 Each      ondansetron (Zofran) syringe/vial injection 4 mg 4 mg at 09/12/24 0035    NS infusion New Bag at 09/12/24 0035    Pharmacy Consult Request ...Pain Management Review 1 Each         ASSESSMENT/PLAN:    * Abdominal pain  Assessment & Plan  MRCP indicated Artifact from biliary stent present. Enhancing soft tissue at the proximal aspect concerning for tumor occlusion of the stent.  GI recommended patient proceed with ERCP to help resolve this occlusion.  Patient with increasing pain control with 6 mg Dilaudid with some breakthrough pain postoperatively 9/12.  Patient with increasing pain from constipation, concern of ileus. KUB ordered WNL.  Patient treated with lactulose a.m. 9/12 with 2 successful bowel movements.    Plan:  - Hydromorphone 4 to 6 mg every 3 hours as needed  -Zofran 4 mg every 3 hours as needed for nausea  - cont bowel regimen with 17g Miralax BID  -Continue to monitor bilirubin levels    Anemia  Assessment & Plan  Patient with hemoglobin drop from 7.5-6.6 9/11.  Patient with symptoms of weakness.  Could be in the setting of chronic iron deficiency anemia secondary to malignancy.  Discussed risk and benefits of transfusion with patient and has been and they agree to transfusion of 1 PRBC 9/11.    Plan  - Now stable  -Continue to  monitor hemoglobin with a.m. CBC transfuse hemoglobin less than 7    Pancreatic cancer metastasized to liver (HCC)- (present on admission)  Assessment & Plan  Metastatic pancreatic cancer with lesions to the liver, lungs and possible retroperitoneal involvement per CT abdomen on admission.  Patient is s/p biliary stent placement with ongoing chemotherapy treatment.  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  MRCP with findings of enhancing soft tissue at proximal aspect of biliary stent concerning for tumor occlusion.  Patient underwent ERCP that was successful in removal and replacement of stent 9/11 with Dr. Osorio.     Plan:  - Post op recommendations per GI: Observe clinical course. If bilirubin fails to normalize with this intervention consideration could be given to percutaneous drain placement.  Watch for complications.  - Continue to follow GI recs  - Pain management, cont to titrate to pain control  - MIVF  -Patient does have pain with eating, which has been improved with Creon and Nexium in the past.  Continue IV Protonix and add home Creon supplementation    Moderate malnutrition (HCC)  Assessment & Plan  Per ASPEN guidelines.         Core Measures  IV Fluids: 100 cc/h  Antbx: None  DVT ppx:  SCDs  Code status: Full  Dispo: inpatient    Ila Pacheco, DO  PGY-2, [facility]

## 2024-09-13 ENCOUNTER — APPOINTMENT (OUTPATIENT)
Dept: RADIOLOGY | Facility: MEDICAL CENTER | Age: 64
DRG: 445 | End: 2024-09-13
Payer: COMMERCIAL

## 2024-09-13 LAB
ALBUMIN SERPL BCP-MCNC: 2.9 G/DL (ref 3.2–4.9)
ALBUMIN/GLOB SERPL: 1 G/DL
ALP SERPL-CCNC: 2194 U/L (ref 30–99)
ALT SERPL-CCNC: 92 U/L (ref 2–50)
ANION GAP SERPL CALC-SCNC: 16 MMOL/L (ref 7–16)
ANISOCYTOSIS BLD QL SMEAR: ABNORMAL
AST SERPL-CCNC: 95 U/L (ref 12–45)
BASOPHILS # BLD AUTO: 0 % (ref 0–1.8)
BASOPHILS # BLD: 0 K/UL (ref 0–0.12)
BILIRUB SERPL-MCNC: 13.7 MG/DL (ref 0.1–1.5)
BUN SERPL-MCNC: 8 MG/DL (ref 8–22)
CALCIUM ALBUM COR SERPL-MCNC: 9.4 MG/DL (ref 8.5–10.5)
CALCIUM SERPL-MCNC: 8.5 MG/DL (ref 8.5–10.5)
CHLORIDE SERPL-SCNC: 100 MMOL/L (ref 96–112)
CO2 SERPL-SCNC: 20 MMOL/L (ref 20–33)
CREAT SERPL-MCNC: <0.17 MG/DL (ref 0.5–1.4)
EOSINOPHIL # BLD AUTO: 0 K/UL (ref 0–0.51)
EOSINOPHIL NFR BLD: 0 % (ref 0–6.9)
ERYTHROCYTE [DISTWIDTH] IN BLOOD BY AUTOMATED COUNT: 76.8 FL (ref 35.9–50)
GFR SERPLBLD CREATININE-BSD FMLA CKD-EPI: 137 ML/MIN/1.73 M 2
GLOBULIN SER CALC-MCNC: 2.8 G/DL (ref 1.9–3.5)
GLUCOSE SERPL-MCNC: 116 MG/DL (ref 65–99)
HCT VFR BLD AUTO: 23.6 % (ref 37–47)
HGB BLD-MCNC: 7.8 G/DL (ref 12–16)
LYMPHOCYTES # BLD AUTO: 0.3 K/UL (ref 1–4.8)
LYMPHOCYTES NFR BLD: 3.4 % (ref 22–41)
MACROCYTES BLD QL SMEAR: ABNORMAL
MANUAL DIFF BLD: NORMAL
MCH RBC QN AUTO: 28.6 PG (ref 27–33)
MCHC RBC AUTO-ENTMCNC: 33.1 G/DL (ref 32.2–35.5)
MCV RBC AUTO: 86.4 FL (ref 81.4–97.8)
MONOCYTES # BLD AUTO: 0.6 K/UL (ref 0–0.85)
MONOCYTES NFR BLD AUTO: 6.8 % (ref 0–13.4)
MORPHOLOGY BLD-IMP: NORMAL
NEUTROPHILS # BLD AUTO: 7.9 K/UL (ref 1.82–7.42)
NEUTROPHILS NFR BLD: 89 % (ref 44–72)
NEUTS BAND NFR BLD MANUAL: 0.8 % (ref 0–10)
NRBC # BLD AUTO: 0 K/UL
NRBC BLD-RTO: 0 /100 WBC (ref 0–0.2)
PLATELET # BLD AUTO: 223 K/UL (ref 164–446)
PLATELET BLD QL SMEAR: NORMAL
PMV BLD AUTO: 9.2 FL (ref 9–12.9)
POIKILOCYTOSIS BLD QL SMEAR: NORMAL
POTASSIUM SERPL-SCNC: 3.5 MMOL/L (ref 3.6–5.5)
PROT SERPL-MCNC: 5.7 G/DL (ref 6–8.2)
RBC # BLD AUTO: 2.73 M/UL (ref 4.2–5.4)
RBC BLD AUTO: PRESENT
SODIUM SERPL-SCNC: 136 MMOL/L (ref 135–145)
TARGETS BLD QL SMEAR: NORMAL
WBC # BLD AUTO: 8.8 K/UL (ref 4.8–10.8)
WBC TOXIC VACUOLES BLD QL SMEAR: NORMAL

## 2024-09-13 PROCEDURE — 700105 HCHG RX REV CODE 258

## 2024-09-13 PROCEDURE — 700102 HCHG RX REV CODE 250 W/ 637 OVERRIDE(OP)

## 2024-09-13 PROCEDURE — 770004 HCHG ROOM/CARE - ONCOLOGY PRIVATE *

## 2024-09-13 PROCEDURE — A9270 NON-COVERED ITEM OR SERVICE: HCPCS

## 2024-09-13 PROCEDURE — 99232 SBSQ HOSP IP/OBS MODERATE 35: CPT | Mod: GC | Performed by: FAMILY MEDICINE

## 2024-09-13 PROCEDURE — 85007 BL SMEAR W/DIFF WBC COUNT: CPT

## 2024-09-13 PROCEDURE — 74181 MRI ABDOMEN W/O CONTRAST: CPT

## 2024-09-13 PROCEDURE — 99232 SBSQ HOSP IP/OBS MODERATE 35: CPT | Performed by: NURSE PRACTITIONER

## 2024-09-13 PROCEDURE — 80053 COMPREHEN METABOLIC PANEL: CPT

## 2024-09-13 PROCEDURE — 85027 COMPLETE CBC AUTOMATED: CPT

## 2024-09-13 PROCEDURE — 700111 HCHG RX REV CODE 636 W/ 250 OVERRIDE (IP)

## 2024-09-13 PROCEDURE — RXMED WILLOW AMBULATORY MEDICATION CHARGE

## 2024-09-13 RX ORDER — PANCRELIPASE 60000; 12000; 38000 [USP'U]/1; [USP'U]/1; [USP'U]/1
1-2 CAPSULE, DELAYED RELEASE PELLETS ORAL
Qty: 270 CAPSULE | Refills: 1 | Status: SHIPPED | OUTPATIENT
Start: 2024-09-13

## 2024-09-13 RX ORDER — HYDROMORPHONE HYDROCHLORIDE 2 MG/1
6 TABLET ORAL
Qty: 35 TABLET | Refills: 0 | Status: SHIPPED | OUTPATIENT
Start: 2024-09-13 | End: 2024-09-23

## 2024-09-13 RX ORDER — LACTULOSE 10 G/15ML
20 SOLUTION ORAL 2 TIMES DAILY PRN
Qty: 473 ML | Refills: 0 | Status: SHIPPED | OUTPATIENT
Start: 2024-09-13 | End: 2024-10-13

## 2024-09-13 RX ADMIN — HYDROMORPHONE HYDROCHLORIDE 1 MG: 1 INJECTION, SOLUTION INTRAMUSCULAR; INTRAVENOUS; SUBCUTANEOUS at 18:19

## 2024-09-13 RX ADMIN — HYDROMORPHONE HYDROCHLORIDE 6 MG: 4 TABLET ORAL at 11:18

## 2024-09-13 RX ADMIN — PANCRELIPASE 24000 UNITS: 120000; 24000; 76000 CAPSULE, DELAYED RELEASE PELLETS ORAL at 17:05

## 2024-09-13 RX ADMIN — PANTOPRAZOLE SODIUM 40 MG: 40 INJECTION, POWDER, FOR SOLUTION INTRAVENOUS at 04:43

## 2024-09-13 RX ADMIN — SODIUM CHLORIDE: 9 INJECTION, SOLUTION INTRAVENOUS at 23:44

## 2024-09-13 RX ADMIN — HYDROMORPHONE HYDROCHLORIDE 4 MG: 4 TABLET ORAL at 04:44

## 2024-09-13 RX ADMIN — PANCRELIPASE 24000 UNITS: 120000; 24000; 76000 CAPSULE, DELAYED RELEASE PELLETS ORAL at 11:18

## 2024-09-13 RX ADMIN — HYDROMORPHONE HYDROCHLORIDE 6 MG: 4 TABLET ORAL at 00:33

## 2024-09-13 RX ADMIN — HYDROMORPHONE HYDROCHLORIDE 4 MG: 4 TABLET ORAL at 23:42

## 2024-09-13 RX ADMIN — POLYETHYLENE GLYCOL 3350 1 PACKET: 17 POWDER, FOR SOLUTION ORAL at 17:04

## 2024-09-13 RX ADMIN — HYDROMORPHONE HYDROCHLORIDE 1 MG: 1 INJECTION, SOLUTION INTRAMUSCULAR; INTRAVENOUS; SUBCUTANEOUS at 13:03

## 2024-09-13 RX ADMIN — SENNOSIDES AND DOCUSATE SODIUM 2 TABLET: 50; 8.6 TABLET ORAL at 17:05

## 2024-09-13 RX ADMIN — ONDANSETRON 4 MG: 2 INJECTION INTRAMUSCULAR; INTRAVENOUS at 00:33

## 2024-09-13 RX ADMIN — HYDROMORPHONE HYDROCHLORIDE 6 MG: 4 TABLET ORAL at 17:04

## 2024-09-13 RX ADMIN — PANCRELIPASE 24000 UNITS: 120000; 24000; 76000 CAPSULE, DELAYED RELEASE PELLETS ORAL at 08:07

## 2024-09-13 SDOH — ECONOMIC STABILITY: TRANSPORTATION INSECURITY
IN THE PAST 12 MONTHS, HAS LACK OF RELIABLE TRANSPORTATION KEPT YOU FROM MEDICAL APPOINTMENTS, MEETINGS, WORK OR FROM GETTING THINGS NEEDED FOR DAILY LIVING?: NO

## 2024-09-13 SDOH — ECONOMIC STABILITY: TRANSPORTATION INSECURITY
IN THE PAST 12 MONTHS, HAS THE LACK OF TRANSPORTATION KEPT YOU FROM MEDICAL APPOINTMENTS OR FROM GETTING MEDICATIONS?: NO

## 2024-09-13 ASSESSMENT — ENCOUNTER SYMPTOMS
FEVER: 0
VOMITING: 0
HEARTBURN: 0
CHILLS: 0
CONSTIPATION: 1
BLURRED VISION: 0
SHORTNESS OF BREATH: 0
DIARRHEA: 0
BLOOD IN STOOL: 0
COUGH: 0
WEAKNESS: 1
DIZZINESS: 0
ABDOMINAL PAIN: 1
NAUSEA: 0
BACK PAIN: 0
DEPRESSION: 0

## 2024-09-13 ASSESSMENT — PAIN DESCRIPTION - PAIN TYPE
TYPE: ACUTE PAIN

## 2024-09-13 ASSESSMENT — SOCIAL DETERMINANTS OF HEALTH (SDOH)
IN THE PAST 12 MONTHS, HAS THE ELECTRIC, GAS, OIL, OR WATER COMPANY THREATENED TO SHUT OFF SERVICE IN YOUR HOME?: NO
WITHIN THE PAST 12 MONTHS, YOU WORRIED THAT YOUR FOOD WOULD RUN OUT BEFORE YOU GOT THE MONEY TO BUY MORE: NEVER TRUE
WITHIN THE PAST 12 MONTHS, THE FOOD YOU BOUGHT JUST DIDN'T LAST AND YOU DIDN'T HAVE MONEY TO GET MORE: NEVER TRUE

## 2024-09-13 NOTE — CARE PLAN
The patient is Watcher - Medium risk of patient condition declining or worsening    Shift Goals  Clinical Goals: Patient will have BM, Pain control  Patient Goals: pain control  Family Goals: pain control    Progress made toward(s) clinical / shift goals:      Problem: Pain - Standard  Goal: Alleviation of pain or a reduction in pain to the patient’s comfort goal  Outcome: Progressing  Flowsheets (Taken 9/12/2024 1613)  Pain Rating Scale (NPRS): 7  Note: Gave pain medication to patient, distraction with family members, blankets and heat packs.     Problem: Knowledge Deficit - Standard  Goal: Patient and family/care givers will demonstrate understanding of plan of care, disease process/condition, diagnostic tests and medications  Outcome: Progressing  Note: AOx4, patient understands plan of care, all questions answered at this time.      Problem: Bowel/Gastric:  Goal: Normal bowel function is maintained or improved  Outcome: Progressing  Flowsheets (Taken 9/12/2024 0915)  Last BM: 09/12/24  Note: Patient had bowel movement today, given stool softeners, and lactulose.

## 2024-09-13 NOTE — PROGRESS NOTES
[facility] [facility] [place]        Attending:   Dr. Gomez    Resident:   Watson Zamora M.D.    PATIENT:   Bruce Wilson; [id number]; 1960    ID:   64 y.o. female admitted for biliary stent obstruction in the setting of stage IV pancreatic cancer with mets    SUBJECTIVE:   No acute events overnight, patient states that she is able to eat better after adding her home Creon.  Pain is about a 4 out of 10 currently with the 4 to 6 mg of Dilaudid.  Her main concern today has to do with making sure her bowel movements are regular as having constipation increases her pain.  Did have 3 bowel movements yesterday.  Denies fevers, chills, shortness of breath, chest pain.  No nausea or vomiting.      Her  asks that we follow-up with pharmacy to see if he will be able to cut her pills in half on discharge that way they can go home with just 4 mg pills and not need for and 2 mg.  Also discussed possibility of just receiving 2 mg pills and titrating to 6 if needed.  He is also asking that we send them home with a lactulose prescription for as needed use only because that seems to be the only thing working for her bowel regimen.  I reiterated GI team's recommendation to avoid as it can induce ileus but he states that he does not think it is as high of risk as they only plan to use it intermittently.  He does not want to use relistor which was recommended as he states that she will be starting a low dose of naloxone already for help with the cancer progression and believes that adding this will mess with the pharmacology of the low-dose naloxone.    OBJECTIVE:  Vitals:    09/12/24 2010 09/13/24 0020 09/13/24 0506 09/13/24 0759   BP: 127/71  103/65 113/54   Pulse: (!) 120 98 96 91   Resp: 16  18 16   Temp: 36.7 °C (98.1 °F)  37.1 °C (98.8 °F) 37.1 °C (98.8 °F)   TempSrc: Oral  Oral Temporal   SpO2: 94%  92% 93%   Weight:       Height:         No intake or output data in the 24 hours ending 09/13/24  "0511    PHYSICAL EXAM:   General: Jaundiced, cachectic on exam, sitting comfortably in chair eating breakfast  HEENT: Scleral icterus present, extraocular movements intact  Cardiovascular: RRR without murmurs. Normal capillary refill   Respiratory: CTAB  Abdomen: soft, mildly tender, mildly distended, improved from prior, no masses  EXT:  BOB, no edema  Skin: No erythema/lesions   Neuro: Non-focal    LABS:  Recent Labs     09/11/24  0040 09/11/24  1621 09/12/24 0240 09/13/24  0040   WBC 4.7*  --  4.8 8.8   RBC 2.35*  --  2.72* 2.73*   HEMOGLOBIN 6.6* 8.2* 7.7* 7.8*   HEMATOCRIT 20.1* 24.9* 23.2* 23.6*   MCV 85.5  --  85.3 86.4   MCH 28.1  --  28.3 28.6   RDW 80.6*  --  74.4* 76.8*   PLATELETCT 241  --  222 223   MPV 9.2  --  9.3 9.2   NEUTSPOLYS 65.70  --  65.50 89.00*   LYMPHOCYTES 12.10*  --  10.50* 3.40*   MONOCYTES 17.80*  --  17.70* 6.80   EOSINOPHILS 1.70  --  3.60 0.00   BASOPHILS 0.60  --  0.40 0.00   RBCMORPHOLO  --   --   --  Present     Recent Labs     09/11/24 0040 09/12/24 0240 09/13/24  0040   SODIUM 133* 135 136   POTASSIUM 4.0 3.9 3.5*   CHLORIDE 100 102 100   CO2 24 22 20   BUN 8 5* 8   CREATININE 0.44* 0.19* <0.17*   CALCIUM 8.6 8.7 8.5   ALBUMIN 2.9* 2.8* 2.9*     Estimated GFR/CRCL = CrCl cannot be calculated (This lab value cannot be used to calculate CrCl because it is not a number: <0.17).  Recent Labs     09/11/24 0040 09/12/24 0240 09/13/24  0040   GLUCOSE 106* 87 116*     Recent Labs     09/11/24 0040 09/12/24 0240 09/13/24  0040   ASTSGOT 91* 97* 95*   ALTSGPT 93* 90* 92*   TBILIRUBIN 7.0* 10.0* 13.7*   IBILIRUBIN  --  1.9*  --    DBILIRUBIN  --  8.1*  --    ALKPHOSPHAT 1925* 2041* 2194*   GLOBULIN 3.0  --  2.8             No results for input(s): \"INR\", \"APTT\", \"FIBRINOGEN\" in the last 72 hours.    Invalid input(s): \"DIMER\"      IMAGING:  GO-SZCFYTO-9 VIEW   Final Result      1. Nonobstructive bowel gas pattern.   2. Interval decrease in amount of stool throughout the colon noted " on the prior CT.   3. Biliary stent.      [id number] STENT - TUBE   Final Result      Fluoroscopic guidance provided for ERCP. Total fluoroscopy time used was 1 minute and 5 seconds.      MR-ABDOMEN-WITH & W/O   Final Result      1.  Poor definition of pancreatic head mass.   2.  Artifact from biliary stent present.  Enhancing soft tissue at the proximal aspect concerning for tumor occlusion of the stent.   3.  Mild intrahepatic biliary dilation.   4.  Gas in the gallbladder, likely secondary to stent.   5.  Multiple liver lesions, primarily in the RIGHT lobe, most likely indicating metastasis.   6.  Small volume ascites.            OUTSIDE IMAGES-CT ABDOMEN /PELVIS   Final Result          MEDS:  Current Facility-Administered Medications   Medication Last Admin    pancrelipase (Lip-Prot-Amyl) (Creon 32979) [id number] units capsule 24,000 Units 24,000 Units at 09/13/24 1118    pancrelipase (Lip-Prot-Amyl) (Creon 6000) 6000-15234 units capsule 12,000 Units      pantoprazole (Protonix) injection 40 mg 40 mg at 09/13/24 0443    HYDROmorphone (Dilaudid) tablet 4 mg 4 mg at 09/13/24 0444    Or    HYDROmorphone (Dilaudid) tablet 6 mg 6 mg at 09/13/24 1118    Or    HYDROmorphone (Dilaudid) injection 1 mg 1 mg at 09/13/24 1303    senna-docusate (Pericolace Or Senokot S) 8.6-50 MG per tablet 2 Tablet 2 Tablet at 09/12/24 1752    And    polyethylene glycol/lytes (Miralax) Packet 1 Packet 1 Packet at 09/12/24 0538    magnesium hydroxide (Milk Of Magnesia) suspension 30 mL 30 mL at 09/12/24 0527    Pharmacy Consult Request ...Pain Management Review 1 Each      ondansetron (Zofran) syringe/vial injection 4 mg 4 mg at 09/13/24 0033    NS infusion New Bag at 09/12/24 2004    Pharmacy Consult Request ...Pain Management Review 1 Each         ASSESSMENT/PLAN:    * Abdominal pain  Assessment & Plan  MRCP indicated Artifact from biliary stent present. Enhancing soft tissue at the proximal aspect concerning for tumor occlusion of  the stent.  GI recommended patient proceed with ERCP to help resolve this occlusion.  Patient with increasing pain control with 6 mg Dilaudid with some breakthrough pain postoperatively 9/12.  Patient with increasing pain from constipation, concern of ileus. KUB ordered WNL.  Patient treated with lactulose a.m. 9/12 with 2 successful bowel movements.  Likely source of pain is biliary obstruction versus constipation still.  Difficult to interpret as Zofran and hydromorphone which helps some symptoms can also contribute to constipation.  Will attempt to optimize to find middle ground.  Discussion with patient and family about risk of ileus with lactulose.  They would like a prescription to be used only in emergencies if patient is unable to have bowel movements and is having significant pain.    Plan:  - Hydromorphone 4 to 6 mg every 3 hours as needed  -Zofran 4 mg every 3 hours as needed for nausea  - cont bowel regimen with 17g Miralax BID  -Continue to monitor bilirubin levels  -Consider lactulose prescription on discharge    Anemia  Assessment & Plan  Patient with hemoglobin drop from 7.5-6.6 9/11, patient had symptoms of weakness.  Transfused with resolution, 7.8 on 9/13    Plan  -Continue to monitor hemoglobin with a.m. CBC transfuse hemoglobin less than 7    Moderate malnutrition (HCC)  Assessment & Plan  Per ASPEN guidelines.    Pancreatic cancer metastasized to liver (HCC)- (present on admission)  Assessment & Plan  Metastatic pancreatic cancer with lesions to the liver, lungs and possible retroperitoneal involvement per CT abdomen on admission.  Patient is s/p biliary stent placement with ongoing chemotherapy treatment.  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  MRCP with findings of enhancing soft tissue at proximal aspect of biliary stent concerning for tumor occlusion.  Patient underwent ERCP on 9/11.  After speaking with IR and GI on 9/13,  concerned that tumor is still obstructing stent as his bilirubin is continuing to increase.  Also possible this could be reactionary in the postop setting.  If obstructed, consideration would have to be made for drain placement however given the location of the tumor on imaging it is likely that multiple drains would have to be placed in order to alleviate obstruction.    Plan:  - Post op recommendations per GI: Observe clinical course. If bilirubin fails to normalize with this intervention consideration could be given to percutaneous drain placement.  - Continue to follow GI and IR recs  - Pain management, cont to titrate to pain control  - MIVF  - Patient with improved eating after addition of home meds.,  Continue Protonix and Creon.         Core Measures  IV Fluids: NS at 100 cc/h  Antbx: None  DVT ppx:  SCDs  Code status: Full  Dispo: inpatient    Watson Zamora MD  [facility]  PGY-1

## 2024-09-13 NOTE — PROGRESS NOTES
..Gastroenterology Progress Note               Author:  Janina Mosquera, AUGUSTINE,  APRN Date & Time Created: 9/13/2024 7:35 AM       Patient ID:  Name:             Bruce Wilson  YOB: 1960  Age:                 64 y.o.  female  MRN:               [id number]      Medical Decision Making, by Problem:  Active Hospital Problems    Diagnosis     Anemia [D64.9]     Abdominal pain [R10.9]     Moderate malnutrition (HCC) [E44.0]     Pancreatic cancer metastasized to liver (HCC) [C25.9, C78.7]      Presenting Chief Complaint:  Jaundice     History of Present Illness:   64-year-old female transferred from [facility] for jaundice.  Patient has a history of pancreatic cancer.  She was in the area visiting with an oncologist who was going to pursue a nontraditional treatment for her pancreatic cancer as best I understand.  She was noticed to be jaundiced.  She went to [facility].  She was transferred here.  Patient has had a metal stent placed in her bile duct.  It is unclear whether this was covered or uncovered.  This was done in [place].  Her  may have records related to that.  Patient denies fevers.  ROS per admitting HPI otherwise.  No changes.     The patient had an EGD done on 8/12/2024.  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    9/11/2024: ERCP  IMPRESSION:  -Antral telangiectasias, diminutive.  Favor radiation effect.  Nonbleeding.  -Ulcer at the duodenal apex with stenosis, dilated with TTS balloon to 13.5 mm.  -Biliary stent removal with replacement (10 mm x 4 cm fully covered self-expanding metallic stent)    Interval History:  9/12/2024: Patient seen in collaboration with the primary team,  also at bedside.  Continues to complain of constipation and fatigue and does not feel ready to go home.  Pain controlled on current regimen.  Discussed ERCP findings and current lab results.  AST/ALT minimally decreased, alk phos and T. Bili uptrending now 10>7    9/13/2024: Patient seen with   and daughter at bedside.  Worked in collaboration with Dr. Chavez, Dr. Solo and Dr. Zamora from primary team to present current images and plan of care.  Dr. Chavez went through detailed description of current pathophysiology and anatomical challenges.     Labs reviewed, hemoglobin 7.8, alk phos and T. bili increased, now 13.7> 10. Last BM yesterday morning    Hospital Medications:  Current Facility-Administered Medications   Medication Dose Frequency Provider Last Rate Last Admin    pancrelipase (Lip-Prot-Amyl) (Creon 31181) 41831-68239 units capsule 24,000 Units  1 Capsule TID WITH MEALS Brandie Joseph, PharmSILVIA   24,000 Units at 09/12/24 1753    pancrelipase (Lip-Prot-Amyl) (Creon 6000) 6000-71057 units capsule 12,000 Units  2 Capsule With Snacks PRN Brandie Joseph, eKlleD        pantoprazole (Protonix) injection 40 mg  40 mg DAILY Ila Pacheco D.O.   40 mg at 09/13/24 0443    HYDROmorphone (Dilaudid) tablet 4 mg  4 mg Q3HRS PRN Ila Pacheco, D.O.   4 mg at 09/13/24 0444    Or    HYDROmorphone (Dilaudid) tablet 6 mg  6 mg Q3HRS PRN Ila Pacheco D.O.   6 mg at 09/13/24 0033    Or    HYDROmorphone (Dilaudid) injection 1 mg  1 mg Q3HRS PRN Ila Pacheco D.O.   1 mg at 09/12/24 1613    senna-docusate (Pericolace Or Senokot S) 8.6-50 MG per tablet 2 Tablet  2 Tablet Q EVENING SILVIA Villatoro.O.   2 Tablet at 09/12/24 1752    And    polyethylene glycol/lytes (Miralax) Packet 1 Packet  1 Packet BID SILVIA Villatoro.O.   1 Packet at 09/12/24 0538    magnesium hydroxide (Milk Of Magnesia) suspension 30 mL  30 mL DAILY SILVIA Villatoro.O.   30 mL at 09/12/24 0527    Pharmacy Consult Request ...Pain Management Review 1 Each  1 Each PHARMACY TO DOSE Ila Pacheco D.O.        ondansetron (Zofran) syringe/vial injection 4 mg  4 mg Q3HRS PRN Ila Pacheco D.O.   4 mg at 09/13/24 0033    NS infusion   Continuous Yovani Carrillo M.D. 100 mL/hr at 09/12/24  "2004 New Bag at 09/12/24 2004    Pharmacy Consult Request ...Pain Management Review 1 Each  1 Each PHARMACY TO DOSE Yovani Carrillo M.D.       Last reviewed on 9/11/2024 11:10 AM by Zachariah Gilmore D.O.       Review of Systems:  Review of Systems   Constitutional:  Positive for malaise/fatigue. Negative for chills and fever.   HENT:  Negative for hearing loss.    Eyes:  Negative for blurred vision.   Respiratory:  Negative for cough and shortness of breath.    Cardiovascular:  Negative for chest pain and leg swelling.   Gastrointestinal:  Positive for abdominal pain and constipation. Negative for blood in stool, diarrhea, heartburn, melena, nausea and vomiting.   Genitourinary:  Negative for dysuria.   Musculoskeletal:  Negative for back pain.   Skin:  Negative for rash.   Neurological:  Positive for weakness. Negative for dizziness.   Psychiatric/Behavioral:  Negative for depression.    All other systems reviewed and are negative.    Vital signs:  Weight/BMI: Body mass index is 17.9 kg/m².  /65   Pulse 96   Temp 37.1 °C (98.8 °F) (Oral)   Resp 18   Ht 1.575 m (5' 2\")   Wt 44.4 kg (97 lb 14.2 oz)   SpO2 92%   Vitals:    09/12/24 1600 09/12/24 2010 09/13/24 0020 09/13/24 0506   BP: 131/68 127/71  103/65   Pulse: 90 (!) 120 98 96   Resp: 18 16  18   Temp: 36.9 °C (98.5 °F) 36.7 °C (98.1 °F)  37.1 °C (98.8 °F)   TempSrc: Oral Oral  Oral   SpO2: 97% 94%  92%   Weight:       Height:         Oxygen Therapy:  Pulse Oximetry: 92 %, O2 (LPM): 0, O2 Delivery Device: None - Room Air  No intake or output data in the 24 hours ending 09/13/24 0735      Physical Exam  Vitals and nursing note reviewed.   Constitutional:       General: She is not in acute distress.     Appearance: She is ill-appearing.   HENT:      Head: Normocephalic and atraumatic.      Right Ear: External ear normal.      Left Ear: External ear normal.      Nose: Nose normal.      Mouth/Throat:      Mouth: Mucous membranes are moist.      Pharynx: " Oropharynx is clear.   Eyes:      General: Scleral icterus present.   Cardiovascular:      Rate and Rhythm: Normal rate and regular rhythm.      Pulses: Normal pulses.      Heart sounds: Normal heart sounds.   Pulmonary:      Effort: Pulmonary effort is normal. No respiratory distress.      Breath sounds: Normal breath sounds.   Abdominal:      General: Abdomen is flat. Bowel sounds are normal. There is no distension.      Palpations: Abdomen is soft.      Tenderness: There is no abdominal tenderness.   Musculoskeletal:         General: Normal range of motion.      Cervical back: Normal range of motion.   Skin:     General: Skin is warm and dry.      Capillary Refill: Capillary refill takes less than 2 seconds.      Coloration: Skin is jaundiced.   Neurological:      Mental Status: She is alert and oriented to person, place, and time.      Motor: Weakness present.   Psychiatric:         Mood and Affect: Mood normal.         Behavior: Behavior normal.       Labs:  Recent Labs     09/11/24 0040 09/12/24 0240 09/13/24 0040   SODIUM 133* 135 136   POTASSIUM 4.0 3.9 3.5*   CHLORIDE 100 102 100   CO2 24 22 20   BUN 8 5* 8   CREATININE 0.44* 0.19* <0.17*   CALCIUM 8.6 8.7 8.5     Recent Labs     09/11/24 0040 09/12/24 0240 09/13/24  0040   ALTSGPT 93* 90* 92*   ASTSGOT 91* 97* 95*   ALKPHOSPHAT 1925* 2041* 2194*   TBILIRUBIN 7.0* 10.0* 13.7*   DBILIRUBIN  --  8.1*  --    GLUCOSE 106* 87 116*     Recent Labs     09/11/24 0040 09/12/24 0240 09/13/24  0040   WBC 4.7* 4.8 8.8   NEUTSPOLYS 65.70 65.50 89.00*   LYMPHOCYTES 12.10* 10.50* 3.40*   MONOCYTES 17.80* 17.70* 6.80   EOSINOPHILS 1.70 3.60 0.00   BASOPHILS 0.60 0.40 0.00   ASTSGOT 91* 97* 95*   ALTSGPT 93* 90* 92*   ALKPHOSPHAT 1925* 2041* 2194*   TBILIRUBIN 7.0* 10.0* 13.7*     Recent Labs     09/11/24  0040 09/11/24  1621 09/12/24 0240 09/13/24  0040   RBC 2.35*  --  2.72* 2.73*   HEMOGLOBIN 6.6* 8.2* 7.7* 7.8*   HEMATOCRIT 20.1* 24.9* 23.2* 23.6*   PLATELETCT  241  --  222 223   IRON  --  40  --   --    FERRITIN  --  568.0*  --   --    TOTIRONBC  --  274  --   --      Recent Results (from the past 24 hour(s))   Comp Metabolic Panel    Collection Time: 09/13/24 12:40 AM   Result Value Ref Range    Sodium 136 135 - 145 mmol/L    Potassium 3.5 (L) 3.6 - 5.5 mmol/L    Chloride 100 96 - 112 mmol/L    Co2 20 20 - 33 mmol/L    Anion Gap 16.0 7.0 - 16.0    Glucose 116 (H) 65 - 99 mg/dL    Bun 8 8 - 22 mg/dL    Creatinine <0.17 (L) 0.50 - 1.40 mg/dL    Calcium 8.5 8.5 - 10.5 mg/dL    Correct Calcium 9.4 8.5 - 10.5 mg/dL    AST(SGOT) 95 (H) 12 - 45 U/L    ALT(SGPT) 92 (H) 2 - 50 U/L    Alkaline Phosphatase 2194 (H) 30 - 99 U/L    Total Bilirubin 13.7 (H) 0.1 - 1.5 mg/dL    Albumin 2.9 (L) 3.2 - 4.9 g/dL    Total Protein 5.7 (L) 6.0 - 8.2 g/dL    Globulin 2.8 1.9 - 3.5 g/dL    A-G Ratio 1.0 g/dL   CBC WITH DIFFERENTIAL    Collection Time: 09/13/24 12:40 AM   Result Value Ref Range    WBC 8.8 4.8 - 10.8 K/uL    RBC 2.73 (L) 4.20 - 5.40 M/uL    Hemoglobin 7.8 (L) 12.0 - 16.0 g/dL    Hematocrit 23.6 (L) 37.0 - 47.0 %    MCV 86.4 81.4 - 97.8 fL    MCH 28.6 27.0 - 33.0 pg    MCHC 33.1 32.2 - 35.5 g/dL    RDW 76.8 (H) 35.9 - 50.0 fL    Platelet Count 223 164 - 446 K/uL    MPV 9.2 9.0 - 12.9 fL    Neutrophils-Polys 89.00 (H) 44.00 - 72.00 %    Lymphocytes 3.40 (L) 22.00 - 41.00 %    Monocytes 6.80 0.00 - 13.40 %    Eosinophils 0.00 0.00 - 6.90 %    Basophils 0.00 0.00 - 1.80 %    Nucleated RBC 0.00 0.00 - 0.20 /100 WBC    Neutrophils (Absolute) 7.90 (H) 1.82 - 7.42 K/uL    Lymphs (Absolute) 0.30 (L) 1.00 - 4.80 K/uL    Monos (Absolute) 0.60 0.00 - 0.85 K/uL    Eos (Absolute) 0.00 0.00 - 0.51 K/uL    Baso (Absolute) 0.00 0.00 - 0.12 K/uL    NRBC (Absolute) 0.00 K/uL    Anisocytosis 1+     Macrocytosis 1+    DIFFERENTIAL MANUAL    Collection Time: 09/13/24 12:40 AM   Result Value Ref Range    Bands-Stabs 0.80 0.00 - 10.00 %    Manual Diff Status PERFORMED    PERIPHERAL SMEAR REVIEW     Collection Time: 09/13/24 12:40 AM   Result Value Ref Range    Peripheral Smear Review see below    PLATELET ESTIMATE    Collection Time: 09/13/24 12:40 AM   Result Value Ref Range    Plt Estimation Normal    MORPHOLOGY    Collection Time: 09/13/24 12:40 AM   Result Value Ref Range    RBC Morphology Present     Poikilocytosis 1+     Target Cells 2+     Toxic Vacuoles Few    ESTIMATED GFR    Collection Time: 09/13/24 12:40 AM   Result Value Ref Range    GFR (CKD-EPI) 137 >60 mL/min/1.73 m 2       Radiology Review:  [id number] VIEW   Final Result      1. Nonobstructive bowel gas pattern.   2. Interval decrease in amount of stool throughout the colon noted on the prior CT.   3. Biliary stent.      DA-REAK-VPZLGGH STENT - TUBE   Final Result      Fluoroscopic guidance provided for ERCP. Total fluoroscopy time used was 1 minute and 5 seconds.      MR-ABDOMEN-WITH & W/O   Final Result      1.  Poor definition of pancreatic head mass.   2.  Artifact from biliary stent present.  Enhancing soft tissue at the proximal aspect concerning for tumor occlusion of the stent.   3.  Mild intrahepatic biliary dilation.   4.  Gas in the gallbladder, likely secondary to stent.   5.  Multiple liver lesions, primarily in the RIGHT lobe, most likely indicating metastasis.   6.  Small volume ascites.            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  Duodenal apex ulcer  Small volume ascites  Normocytic anemia  Lymphopenia   Hypoalbuminemia   Opioid-induced constipation    RECOMMENDATIONS:  Bilirubin continuing to increase, multifactorial due to intrahepatic metastasis and CBD  obstruction  Reached out to interventional radiology to see if they can place a PTC drain  Primary team will order MRCP for better delineation of the anatomy  Will await IR review of case before making further clinical decisions  Continue twice daily MiraLAX, avoid lactulose as this can contribute to ileus and start Relistor for opioid-induced constipation   Encourage ambulation as tolerated    Plan discussed with patient and her , primary team, Dr. Chavez    ..Janina Mosquera, AUGUSTINE,  APRN    Core Quality Measures   Reviewed items::  Labs, Medications and Radiology reports reviewed

## 2024-09-13 NOTE — CARE PLAN
The patient is Watcher - Medium risk of patient condition declining or worsening    Shift Goals  Clinical Goals: pain control, monitor bili count  Patient Goals: pain control  Family Goals: continue with bowel regimen    Progress made toward(s) clinical / shift goals: Patient is A/OX4, patient is able to understand plan of care. All questions answered at this time. Pain is being controlled through PRN medications per the MAR. Patient is stating a comfortable pain level. Patient remained free of falls throughout shift, fall precautions in place. Bed in lowest position, belongings and call light in reach.

## 2024-09-13 NOTE — CARE PLAN
The patient is Watcher - Medium risk of patient condition declining or worsening    Shift Goals  Clinical Goals: pain control, sleep, monitor Bili count  Patient Goals: pain control sleep  Family Goals: pain control, updates    Progress made toward(s) clinical / shift goals:      Pt bili count continues to increase. Pt more jaundice and weak as compared to previous shift. Pt eructated to not get up on her own. Pt says that  and daughter assist her when getting up.   Problem: Bowel/Gastric:  Goal: Will not experience complications related to bowel motility  Outcome: Progressing  Note: Abdomen is less tender and firm as compared to previous shift.        Patient is not progressing towards the following goals:      Problem: Pain - Standard  Goal: Alleviation of pain or a reduction in pain to the patient’s comfort goal  Outcome: Not Met  Note: Pt is high pain control. PRN  pain medication frequently given .

## 2024-09-13 NOTE — DIETARY
Nutrition Update: Follow-up for PO intake  Day 4 of admit.  Bruce Wilson is a 64 y.o. female with admitting DX of Biliary stent obstruction, initial encounter [T85.106A].    Current Diet: Regular w/ supplements  Per ADLs, PO 50-75 x 1 snacks and 50-75% x 1 meal    Visited pt at bedside. Pt said she wasn't drinking any of the supplements and would like to d/c supplements at this time. Pt said that her taste is getting worse. Pt didn't have any particular food requests at time of visit.    Problem: Nutritional:  Goal: Achieve adequate nutritional intake  Description: Patient will consume >50% of meals  Outcome: Progressing.    Recommend:  RD to remove supplements from pt tray. Will keep supplement order in case pt would like to receive supplements once more.   Encourage and document PO intake  Follow weights    RD following.

## 2024-09-14 LAB
ALBUMIN SERPL BCP-MCNC: 2.7 G/DL (ref 3.2–4.9)
ALBUMIN/GLOB SERPL: 0.9 G/DL
ALP SERPL-CCNC: 1794 U/L (ref 30–99)
ALT SERPL-CCNC: 69 U/L (ref 2–50)
ANION GAP SERPL CALC-SCNC: 11 MMOL/L (ref 7–16)
ANISOCYTOSIS BLD QL SMEAR: ABNORMAL
AST SERPL-CCNC: 56 U/L (ref 12–45)
BASOPHILS # BLD AUTO: 0 % (ref 0–1.8)
BASOPHILS # BLD: 0 K/UL (ref 0–0.12)
BILIRUB SERPL-MCNC: 14.4 MG/DL (ref 0.1–1.5)
BUN SERPL-MCNC: 12 MG/DL (ref 8–22)
CALCIUM ALBUM COR SERPL-MCNC: 9.6 MG/DL (ref 8.5–10.5)
CALCIUM SERPL-MCNC: 8.6 MG/DL (ref 8.5–10.5)
CHLORIDE SERPL-SCNC: 102 MMOL/L (ref 96–112)
CO2 SERPL-SCNC: 21 MMOL/L (ref 20–33)
CREAT SERPL-MCNC: <0.17 MG/DL (ref 0.5–1.4)
EOSINOPHIL # BLD AUTO: 0.06 K/UL (ref 0–0.51)
EOSINOPHIL NFR BLD: 0.8 % (ref 0–6.9)
ERYTHROCYTE [DISTWIDTH] IN BLOOD BY AUTOMATED COUNT: 76.9 FL (ref 35.9–50)
GFR SERPLBLD CREATININE-BSD FMLA CKD-EPI: 137 ML/MIN/1.73 M 2
GLOBULIN SER CALC-MCNC: 2.9 G/DL (ref 1.9–3.5)
GLUCOSE SERPL-MCNC: 133 MG/DL (ref 65–99)
HCT VFR BLD AUTO: 24.2 % (ref 37–47)
HGB BLD-MCNC: 7.8 G/DL (ref 12–16)
INR PPP: 1.27 (ref 0.87–1.13)
LYMPHOCYTES # BLD AUTO: 0.19 K/UL (ref 1–4.8)
LYMPHOCYTES NFR BLD: 2.6 % (ref 22–41)
MACROCYTES BLD QL SMEAR: ABNORMAL
MANUAL DIFF BLD: NORMAL
MCH RBC QN AUTO: 28.7 PG (ref 27–33)
MCHC RBC AUTO-ENTMCNC: 32.2 G/DL (ref 32.2–35.5)
MCV RBC AUTO: 89 FL (ref 81.4–97.8)
MONOCYTES # BLD AUTO: 0.26 K/UL (ref 0–0.85)
MONOCYTES NFR BLD AUTO: 3.5 % (ref 0–13.4)
MORPHOLOGY BLD-IMP: NORMAL
NEUTROPHILS # BLD AUTO: 6.8 K/UL (ref 1.82–7.42)
NEUTROPHILS NFR BLD: 92.2 % (ref 44–72)
NEUTS BAND NFR BLD MANUAL: 0.9 % (ref 0–10)
NRBC # BLD AUTO: 0 K/UL
NRBC BLD-RTO: 0 /100 WBC (ref 0–0.2)
PLATELET # BLD AUTO: 237 K/UL (ref 164–446)
PLATELET BLD QL SMEAR: NORMAL
PMV BLD AUTO: 10.7 FL (ref 9–12.9)
POIKILOCYTOSIS BLD QL SMEAR: NORMAL
POTASSIUM SERPL-SCNC: 3.7 MMOL/L (ref 3.6–5.5)
PROT SERPL-MCNC: 5.6 G/DL (ref 6–8.2)
PROTHROMBIN TIME: 16.1 SEC (ref 12–14.6)
RBC # BLD AUTO: 2.72 M/UL (ref 4.2–5.4)
RBC BLD AUTO: PRESENT
SODIUM SERPL-SCNC: 134 MMOL/L (ref 135–145)
TARGETS BLD QL SMEAR: NORMAL
WBC # BLD AUTO: 7.3 K/UL (ref 4.8–10.8)
WBC TOXIC VACUOLES BLD QL SMEAR: NORMAL

## 2024-09-14 PROCEDURE — 700105 HCHG RX REV CODE 258

## 2024-09-14 PROCEDURE — 99232 SBSQ HOSP IP/OBS MODERATE 35: CPT | Mod: GC | Performed by: HOSPITALIST

## 2024-09-14 PROCEDURE — 700102 HCHG RX REV CODE 250 W/ 637 OVERRIDE(OP)

## 2024-09-14 PROCEDURE — 85610 PROTHROMBIN TIME: CPT

## 2024-09-14 PROCEDURE — 99232 SBSQ HOSP IP/OBS MODERATE 35: CPT | Performed by: NURSE PRACTITIONER

## 2024-09-14 PROCEDURE — 700101 HCHG RX REV CODE 250

## 2024-09-14 PROCEDURE — 85027 COMPLETE CBC AUTOMATED: CPT

## 2024-09-14 PROCEDURE — 80053 COMPREHEN METABOLIC PANEL: CPT

## 2024-09-14 PROCEDURE — A9270 NON-COVERED ITEM OR SERVICE: HCPCS

## 2024-09-14 PROCEDURE — 700111 HCHG RX REV CODE 636 W/ 250 OVERRIDE (IP)

## 2024-09-14 PROCEDURE — 85007 BL SMEAR W/DIFF WBC COUNT: CPT

## 2024-09-14 PROCEDURE — 770004 HCHG ROOM/CARE - ONCOLOGY PRIVATE *

## 2024-09-14 RX ORDER — MEGESTROL ACETATE 40 MG/ML
400 SUSPENSION ORAL DAILY
Status: DISCONTINUED | OUTPATIENT
Start: 2024-09-14 | End: 2024-09-17 | Stop reason: HOSPADM

## 2024-09-14 RX ORDER — LACTULOSE 10 G/15ML
30 SOLUTION ORAL ONCE
Status: COMPLETED | OUTPATIENT
Start: 2024-09-14 | End: 2024-09-14

## 2024-09-14 RX ORDER — SODIUM CHLORIDE 9 MG/ML
INJECTION, SOLUTION INTRAVENOUS CONTINUOUS
Status: DISCONTINUED | OUTPATIENT
Start: 2024-09-15 | End: 2024-09-14

## 2024-09-14 RX ORDER — SODIUM CHLORIDE 9 MG/ML
INJECTION, SOLUTION INTRAVENOUS DAILY
Status: COMPLETED | OUTPATIENT
Start: 2024-09-15 | End: 2024-09-15

## 2024-09-14 RX ORDER — LIDOCAINE/PRILOCAINE 2.5 %-2.5%
CREAM (GRAM) TOPICAL PRN
Status: DISCONTINUED | OUTPATIENT
Start: 2024-09-14 | End: 2024-09-17 | Stop reason: HOSPADM

## 2024-09-14 RX ADMIN — HYDROMORPHONE HYDROCHLORIDE 4 MG: 4 TABLET ORAL at 13:07

## 2024-09-14 RX ADMIN — SENNOSIDES AND DOCUSATE SODIUM 2 TABLET: 50; 8.6 TABLET ORAL at 18:17

## 2024-09-14 RX ADMIN — MAGNESIUM HYDROXIDE 30 ML: 1200 LIQUID ORAL at 04:50

## 2024-09-14 RX ADMIN — PANTOPRAZOLE SODIUM 40 MG: 40 INJECTION, POWDER, FOR SOLUTION INTRAVENOUS at 04:50

## 2024-09-14 RX ADMIN — PANCRELIPASE 24000 UNITS: 120000; 24000; 76000 CAPSULE, DELAYED RELEASE PELLETS ORAL at 12:06

## 2024-09-14 RX ADMIN — POLYETHYLENE GLYCOL 3350 1 PACKET: 17 POWDER, FOR SOLUTION ORAL at 18:17

## 2024-09-14 RX ADMIN — HYDROMORPHONE HYDROCHLORIDE 4 MG: 4 TABLET ORAL at 07:58

## 2024-09-14 RX ADMIN — LACTULOSE 30 ML: 10 SOLUTION ORAL at 16:28

## 2024-09-14 RX ADMIN — PANCRELIPASE 24000 UNITS: 120000; 24000; 76000 CAPSULE, DELAYED RELEASE PELLETS ORAL at 07:58

## 2024-09-14 RX ADMIN — PANCRELIPASE 24000 UNITS: 120000; 24000; 76000 CAPSULE, DELAYED RELEASE PELLETS ORAL at 18:17

## 2024-09-14 RX ADMIN — SODIUM CHLORIDE: 9 INJECTION, SOLUTION INTRAVENOUS at 09:56

## 2024-09-14 RX ADMIN — SODIUM CHLORIDE: 9 INJECTION, SOLUTION INTRAVENOUS at 12:08

## 2024-09-14 RX ADMIN — HYDROMORPHONE HYDROCHLORIDE 4 MG: 4 TABLET ORAL at 21:23

## 2024-09-14 RX ADMIN — MEGESTROL ACETATE 400 MG: 40 SUSPENSION ORAL at 16:28

## 2024-09-14 ASSESSMENT — ENCOUNTER SYMPTOMS
COUGH: 0
DIZZINESS: 0
HEARTBURN: 0
WEAKNESS: 1
BLOOD IN STOOL: 0
BACK PAIN: 0
SHORTNESS OF BREATH: 0
DIARRHEA: 0
BLURRED VISION: 0
CONSTIPATION: 1
VOMITING: 0
CHILLS: 0
NAUSEA: 0
ABDOMINAL PAIN: 1
DEPRESSION: 0
FEVER: 0

## 2024-09-14 ASSESSMENT — PAIN DESCRIPTION - PAIN TYPE
TYPE: ACUTE PAIN

## 2024-09-14 NOTE — PROGRESS NOTES
[facility] MEDICINE PROGRESS NOTE        Attending:   Dr. Mcgraw    Resident:   Watson Zamora M.D.    PATIENT:   Bruce Wilson; [id number]; 1960    ID:   64 y.o. female admitted for biliary stent obstruction in the setting of stage IV pancreatic cancer with mets    SUBJECTIVE:   No acute events overnight, patient thinks her pain is maybe a little bit better today.  Last bowel movement was yesterday morning and was small.  She just took milk of magnesia to help with keeping things regular.  Her nausea is stable, improved from admission is eating somewhat better but does not like the hospital food.    We discussed that the MRCP was done and GI and IR need to discuss if percutaneous drainage is going to be possible.    OBJECTIVE:  Vitals:    09/13/24 1559 09/13/24 1953 09/14/24 0400 09/14/24 0758   BP: 104/48 95/57 96/58 120/61   Pulse: 76 84 85 71   Resp: 17 18 18 16   Temp: 36.9 °C (98.4 °F) 36.3 °C (97.4 °F) 37.1 °C (98.7 °F) 37.2 °C (99 °F)   TempSrc: Temporal Oral Temporal Temporal   SpO2: 95% 96% 93% 97%   Weight:       Height:         No intake or output data in the 24 hours ending 09/13/24 0511    PHYSICAL EXAM:   General: Jaundiced, cachectic on exam, sitting comfortably in chair  HEENT: Scleral icterus present, extraocular movements intact  Cardiovascular: RRR without murmurs. Normal capillary refill   Respiratory: CTAB  Abdomen: soft, mildly tender, mildly distended, improved from prior, no masses  EXT:  BOB, trace ankle edema  Skin: No erythema/lesions   Neuro: Non-focal    LABS:  Recent Labs     09/12/24  0240 09/13/24  0040 09/14/24  0015   WBC 4.8 8.8 7.3   RBC 2.72* 2.73* 2.72*   HEMOGLOBIN 7.7* 7.8* 7.8*   HEMATOCRIT 23.2* 23.6* 24.2*   MCV 85.3 86.4 89.0   MCH 28.3 28.6 28.7   RDW 74.4* 76.8* 76.9*   PLATELETCT 222 223 237   MPV 9.3 9.2 10.7   NEUTSPOLYS 65.50 89.00* 92.20*   LYMPHOCYTES 10.50* 3.40* 2.60*   MONOCYTES 17.70* 6.80 3.50   EOSINOPHILS 3.60 0.00 0.80   BASOPHILS 0.40 0.00 0.00    RBCMORPHOLO  --  Present Present     Recent Labs     09/12/24  0240 09/13/24  0040 09/14/24  0015   SODIUM 135 136 134*   POTASSIUM 3.9 3.5* 3.7   CHLORIDE 102 100 102   CO2 22 20 21   BUN 5* 8 12   CREATININE 0.19* <0.17* <0.17*   CALCIUM 8.7 8.5 8.6   ALBUMIN 2.8* 2.9* 2.7*     Estimated GFR/CRCL = CrCl cannot be calculated (This lab value cannot be used to calculate CrCl because it is not a number: <0.17).  Recent Labs     09/12/24  0240 09/13/24  0040 09/14/24  0015   GLUCOSE 87 116* 133*     Recent Labs     09/12/24 0240 09/13/24  0040 09/14/24  0015   ASTSGOT 97* 95* 56*   ALTSGPT 90* 92* 69*   TBILIRUBIN 10.0* 13.7* 14.4*   IBILIRUBIN 1.9*  --   --    DBILIRUBIN 8.1*  --   --    ALKPHOSPHAT 2041* 2194* 1794*   GLOBULIN  --  2.8 2.9   INR  --   --  1.27*             Recent Labs     09/14/24  0015   INR 1.27*         IMAGING:  ES-[id number]-1 VIEW   Final Result      1. Nonobstructive bowel gas pattern.   2. Interval decrease in amount of stool throughout the colon noted on the prior CT.   3. Biliary stent.      RB-RBQE-[id number] STENT - TUBE   Final Result      Fluoroscopic guidance provided for ERCP. Total fluoroscopy time used was 1 minute and 5 seconds.      MR-ABDOMEN-WITH & W/O   Final Result      1.  Poor definition of pancreatic head mass.   2.  Artifact from biliary stent present.  Enhancing soft tissue at the proximal aspect concerning for tumor occlusion of the stent.   3.  Mild intrahepatic biliary dilation.   4.  Gas in the gallbladder, likely secondary to stent.   5.  Multiple liver lesions, primarily in the RIGHT lobe, most likely indicating metastasis.   6.  Small volume ascites.            OUTSIDE IMAGES-CT ABDOMEN /PELVIS   Final Result      [id number]-YKAEMUF-E/O    (Results Pending)       MEDS:  Current Facility-Administered Medications   Medication Last Admin    [START ON 9/15/2024] NS infusion New Bag at 09/14/24 1208    megestrol (Megace) 40 MG/ML suspension 400 mg      lactulose 20 GM/30ML  solution 30 mL      pancrelipase (Lip-Prot-Amyl) (Creon 87608) 24419-15414 units capsule 24,000 Units 24,000 Units at 09/14/24 1206    pancrelipase (Lip-Prot-Amyl) (Creon 6000) 6000-98304 units capsule 12,000 Units      pantoprazole (Protonix) injection 40 mg 40 mg at 09/14/24 0450    HYDROmorphone (Dilaudid) tablet 4 mg 4 mg at 09/14/24 1307    Or    HYDROmorphone (Dilaudid) tablet 6 mg 6 mg at 09/13/24 1704    Or    HYDROmorphone (Dilaudid) injection 1 mg 1 mg at 09/13/24 1819    senna-docusate (Pericolace Or Senokot S) 8.6-50 MG per tablet 2 Tablet 2 Tablet at 09/13/24 1705    And    polyethylene glycol/lytes (Miralax) Packet 1 Packet 1 Packet at 09/13/24 1704    magnesium hydroxide (Milk Of Magnesia) suspension 30 mL 30 mL at 09/14/24 0450    Pharmacy Consult Request ...Pain Management Review 1 Each      ondansetron (Zofran) syringe/vial injection 4 mg 4 mg at 09/13/24 0033    Pharmacy Consult Request ...Pain Management Review 1 Each         ASSESSMENT/PLAN:    * Abdominal pain  Assessment & Plan  MRCP indicated Artifact from biliary stent present. Enhancing soft tissue at the proximal aspect concerning for tumor occlusion of the stent.  GI recommended patient proceed with ERCP to help resolve this occlusion.  Patient with increasing pain control with 6 mg Dilaudid with some breakthrough pain postoperatively 9/12.  Patient with increasing pain from constipation, concern of ileus. KUB ordered WNL.  Patient treated with lactulose a.m. 9/12 with 2 successful bowel movements.  Likely source of pain is biliary obstruction versus constipation still.  Difficult to interpret as Zofran and hydromorphone which helps some symptoms can also contribute to constipation.  Will attempt to optimize to find middle ground.  Discussion with patient and family about risk of ileus with lactulose.  They would like a prescription to be used only in emergencies if patient is unable to have bowel movements and is having significant  pain.    Plan:  -Per GI okay for 20 g lactulose once as it was effective previously  - Hydromorphone 4 to 6 mg every 3 hours as needed  -Zofran 4 mg every 3 hours as needed for nausea  - cont bowel regimen with 17g Miralax BID, milk of magnesia, senna  -Continue to monitor bilirubin levels  -Consider lactulose prescription on discharge    Anemia  Assessment & Plan  Patient with hemoglobin drop from 7.5-6.6 9/11, patient had symptoms of weakness.  Transfused with resolution, 7.8 on 9/13, 7.3 on 9/14.    Plan  -Continue to monitor hemoglobin with a.m. CBC transfuse hemoglobin less than 7    Moderate malnutrition (HCC)  Assessment & Plan  Per ASPEN guidelines.  Patient says that she is not hungry even though food does sound good does not taste good.  -Per GI, start the Megestrol for appetite stimulation    Pancreatic cancer metastasized to liver (HCC)- (present on admission)  Assessment & Plan  Metastatic pancreatic cancer with lesions to the liver, lungs and possible retroperitoneal involvement per CT abdomen on admission.  Patient is s/p biliary stent placement with ongoing chemotherapy treatment.  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  MRCP with findings of enhancing soft tissue at proximal aspect of biliary stent concerning for tumor occlusion.  Patient underwent ERCP on 9/11.  After speaking with IR and GI on 9/13, concerned that tumor is still obstructing stent as his bilirubin is continuing to increase.  Also possible this could be reactionary in the postop setting.  If obstructed, consideration would have to be made for drain placement however given the location of the tumor on imaging it is likely that multiple drains would have to be placed in order to alleviate obstruction.  Repeat MRCP ordered on 9/13 for up-to-date imaging prior to shared decision making regarding drain placement versus attempted repeat ERCP. bilirubin continues to trend  upward, 14.4 on 9/14 up from 13.7.    Plan:  - GI to consider repeat ERCP if percutaneous biliary drainage is not indicated  - Continue to follow GI and IR recs  - Pain management, cont to titrate to pain control  - Stop maintenance IV fluids, patient to receive 1 L of fluids at 83 cc/h daily (12 hours total) and support further maintenance with p.o. hydration in order to prevent fluid overload  - Patient with improved eating after addition of home meds.,  Continue Protonix and Creon.         Core Measures  IV Fluids: Stopped today, to resume at midnight at 83 cc/h for 12 hours  Antbx: None  DVT ppx:  SCDs  Code status: Full  Dispo: inpatient, pending GI and/or IR intervention    Watson Zamora MD  [facility]  PGY-1

## 2024-09-14 NOTE — PROGRESS NOTES
..Gastroenterology Progress Note               Author:  Janina Mosquera, AUGUSTINE,  APRN Date & Time Created: 9/14/2024 7:51 AM       Patient ID:  Name:             Bruce Wilson  YOB: 1960  Age:                 64 y.o.  female  MRN:               [id number]      Medical Decision Making, by Problem:  Active Hospital Problems    Diagnosis     Anemia [D64.9]     Abdominal pain [R10.9]     Moderate malnutrition (HCC) [E44.0]     Pancreatic cancer metastasized to liver (HCC) [C25.9, C78.7]      Presenting Chief Complaint:  Jaundice     History of Present Illness:   64-year-old female transferred from [facility] for jaundice.  Patient has a history of pancreatic cancer.  She was in the area visiting with an oncologist who was going to pursue a nontraditional treatment for her pancreatic cancer as best I understand.  She was noticed to be jaundiced.  She went to [facility].  She was transferred here.  Patient has had a metal stent placed in her bile duct.  It is unclear whether this was covered or uncovered.  This was done in [place].  Her  may have records related to that.  Patient denies fevers.  ROS per admitting HPI otherwise.  No changes.     The patient had an EGD done on 8/12/2024.  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    9/11/2024: ERCP  IMPRESSION:  -Antral telangiectasias, diminutive.  Favor radiation effect.  Nonbleeding.  -Ulcer at the duodenal apex with stenosis, dilated with TTS balloon to 13.5 mm.  -Biliary stent removal with replacement (10 mm x 4 cm fully covered self-expanding metallic stent)    Interval History:  9/12/2024: Patient seen in collaboration with the primary team,  also at bedside.  Continues to complain of constipation and fatigue and does not feel ready to go home.  Pain controlled on current regimen.  Discussed ERCP findings and current lab results.  AST/ALT minimally decreased, alk phos and T. Bili uptrending now 10>7    9/13/2024: Patient seen with   and daughter at bedside.  Worked in collaboration with Dr. Chavez, Dr. Solo and Dr. Zamora from primary team to present current images and plan of care.  Dr. Chavez went through detailed description of current pathophysiology and anatomical challenges.     Labs reviewed, hemoglobin 7.8, alk phos and T. bili increased, now 13.7> 10. Last BM yesterday morning    9/14/2024: Patient seen with  bedside in collaboration with Dr. Zamora from primary team.  MRI completed overnight, read pending  T. bili continues to uptrend now 14.4 however alk phos decreasing  No documented BM since 9/12    Hospital Medications:  Current Facility-Administered Medications   Medication Dose Frequency Provider Last Rate Last Admin    pancrelipase (Lip-Prot-Amyl) (Creon 50150) 21296-94670 units capsule 24,000 Units  1 Capsule TID WITH MEALS Brandie Joseph, PharmD   24,000 Units at 09/13/24 1705    pancrelipase (Lip-Prot-Amyl) (Creon 6000) 6000-05685 units capsule 12,000 Units  2 Capsule With Snacks PRN Brandie Joseph, PharmD        pantoprazole (Protonix) injection 40 mg  40 mg DAILY Ila Pacheco, D.O.   40 mg at 09/14/24 0450    HYDROmorphone (Dilaudid) tablet 4 mg  4 mg Q3HRS PRN Ila Pacheco, D.O.   4 mg at 09/13/24 2342    Or    HYDROmorphone (Dilaudid) tablet 6 mg  6 mg Q3HRS PRN Ila Pacheco, D.O.   6 mg at 09/13/24 1704    Or    HYDROmorphone (Dilaudid) injection 1 mg  1 mg Q3HRS PRN Ila Pacheco D.O.   1 mg at 09/13/24 1819    senna-docusate (Pericolace Or Senokot S) 8.6-50 MG per tablet 2 Tablet  2 Tablet Q EVENING Ila Pacheco D.O.   2 Tablet at 09/13/24 1705    And    polyethylene glycol/lytes (Miralax) Packet 1 Packet  1 Packet BID Ila Pacheco D.O.   1 Packet at 09/13/24 1704    magnesium hydroxide (Milk Of Magnesia) suspension 30 mL  30 mL DAILY Ila Pacheco D.O.   30 mL at 09/14/24 2710    Pharmacy Consult Request ...Pain Management Review 1 Each  1 Each  "PHARMACY TO DOSE Ila Pacheco D.O.        ondansetron (Zofran) syringe/vial injection 4 mg  4 mg Q3HRS PRN Ila Pacheco D.O.   4 mg at 09/13/24 0033    NS infusion   Continuous Yovani Carrillo M.D. 100 mL/hr at 09/13/24 2344 New Bag at 09/13/24 2344    Pharmacy Consult Request ...Pain Management Review 1 Each  1 Each PHARMACY TO DOSE Yovani Carrillo M.D.       Last reviewed on 9/11/2024 11:10 AM by Zachariah Gilmore D.O.       Review of Systems:  Review of Systems   Constitutional:  Positive for malaise/fatigue. Negative for chills and fever.   HENT:  Negative for hearing loss.    Eyes:  Negative for blurred vision.   Respiratory:  Negative for cough and shortness of breath.    Cardiovascular:  Negative for chest pain and leg swelling.   Gastrointestinal:  Positive for abdominal pain and constipation. Negative for blood in stool, diarrhea, heartburn, melena, nausea and vomiting.   Genitourinary:  Negative for dysuria.   Musculoskeletal:  Negative for back pain.   Skin:  Negative for rash.   Neurological:  Positive for weakness. Negative for dizziness.   Psychiatric/Behavioral:  Negative for depression.    All other systems reviewed and are negative.    Vital signs:  Weight/BMI: Body mass index is 17.9 kg/m².  BP 96/58   Pulse 85   Temp 37.1 °C (98.7 °F) (Temporal)   Resp 18   Ht 1.575 m (5' 2\")   Wt 44.4 kg (97 lb 14.2 oz)   SpO2 93%   Vitals:    09/13/24 0759 09/13/24 1559 09/13/24 1953 09/14/24 0400   BP: 113/54 104/48 95/57 96/58   Pulse: 91 76 84 85   Resp: 16 17 18 18   Temp: 37.1 °C (98.8 °F) 36.9 °C (98.4 °F) 36.3 °C (97.4 °F) 37.1 °C (98.7 °F)   TempSrc: Temporal Temporal Oral Temporal   SpO2: 93% 95% 96% 93%   Weight:       Height:         Oxygen Therapy:  Pulse Oximetry: 93 %, O2 (LPM): 0, O2 Delivery Device: None - Room Air  No intake or output data in the 24 hours ending 09/14/24 0751      Physical Exam  Vitals and nursing note reviewed.   Constitutional:       General: She is not " in acute distress.     Appearance: She is ill-appearing.   HENT:      Head: Normocephalic and atraumatic.      Right Ear: External ear normal.      Left Ear: External ear normal.      Nose: Nose normal.      Mouth/Throat:      Mouth: Mucous membranes are moist.      Pharynx: Oropharynx is clear.   Eyes:      General: Scleral icterus present.   Cardiovascular:      Rate and Rhythm: Normal rate and regular rhythm.      Pulses: Normal pulses.      Heart sounds: Normal heart sounds.   Pulmonary:      Effort: Pulmonary effort is normal. No respiratory distress.      Breath sounds: Normal breath sounds.   Abdominal:      General: Abdomen is flat. Bowel sounds are normal. There is no distension.      Palpations: Abdomen is soft.      Tenderness: There is no abdominal tenderness.   Musculoskeletal:         General: Normal range of motion.      Cervical back: Normal range of motion.   Skin:     General: Skin is warm and dry.      Capillary Refill: Capillary refill takes less than 2 seconds.      Coloration: Skin is jaundiced.   Neurological:      Mental Status: She is alert and oriented to person, place, and time.      Motor: Weakness present.   Psychiatric:         Mood and Affect: Mood normal.         Behavior: Behavior normal.       Labs:  Recent Labs     09/12/24 0240 09/13/24 0040 09/14/24  0015   SODIUM 135 136 134*   POTASSIUM 3.9 3.5* 3.7   CHLORIDE 102 100 102   CO2 22 20 21   BUN 5* 8 12   CREATININE 0.19* <0.17* <0.17*   CALCIUM 8.7 8.5 8.6     Recent Labs     09/12/24 0240 09/13/24 0040 09/14/24  0015   ALTSGPT 90* 92* 69*   ASTSGOT 97* 95* 56*   ALKPHOSPHAT 2041* 2194* 1794*   TBILIRUBIN 10.0* 13.7* 14.4*   DBILIRUBIN 8.1*  --   --    GLUCOSE 87 116* 133*     Recent Labs     09/12/24 0240 09/13/24 0040 09/14/24  0015   WBC 4.8 8.8 7.3   NEUTSPOLYS 65.50 89.00* 92.20*   LYMPHOCYTES 10.50* 3.40* 2.60*   MONOCYTES 17.70* 6.80 3.50   EOSINOPHILS 3.60 0.00 0.80   BASOPHILS 0.40 0.00 0.00   ASTSGOT 97* 95* 56*    ALTSGPT 90* 92* 69*   ALKPHOSPHAT 2041* 2194* 1794*   TBILIRUBIN 10.0* 13.7* 14.4*     Recent Labs     09/11/24  1621 09/12/24  0240 09/13/24  0040 09/14/24  0015   RBC  --  2.72* 2.73* 2.72*   HEMOGLOBIN 8.2* 7.7* 7.8* 7.8*   HEMATOCRIT 24.9* 23.2* 23.6* 24.2*   PLATELETCT  --  222 223 237   PROTHROMBTM  --   --   --  16.1*   INR  --   --   --  1.27*   IRON 40  --   --   --    FERRITIN 568.0*  --   --   --    TOTIRONBC 274  --   --   --      Recent Results (from the past 24 hour(s))   Comp Metabolic Panel    Collection Time: 09/14/24 12:15 AM   Result Value Ref Range    Sodium 134 (L) 135 - 145 mmol/L    Potassium 3.7 3.6 - 5.5 mmol/L    Chloride 102 96 - 112 mmol/L    Co2 21 20 - 33 mmol/L    Anion Gap 11.0 7.0 - 16.0    Glucose 133 (H) 65 - 99 mg/dL    Bun 12 8 - 22 mg/dL    Creatinine <0.17 (L) 0.50 - 1.40 mg/dL    Calcium 8.6 8.5 - 10.5 mg/dL    Correct Calcium 9.6 8.5 - 10.5 mg/dL    AST(SGOT) 56 (H) 12 - 45 U/L    ALT(SGPT) 69 (H) 2 - 50 U/L    Alkaline Phosphatase 1794 (H) 30 - 99 U/L    Total Bilirubin 14.4 (H) 0.1 - 1.5 mg/dL    Albumin 2.7 (L) 3.2 - 4.9 g/dL    Total Protein 5.6 (L) 6.0 - 8.2 g/dL    Globulin 2.9 1.9 - 3.5 g/dL    A-G Ratio 0.9 g/dL   CBC WITH DIFFERENTIAL    Collection Time: 09/14/24 12:15 AM   Result Value Ref Range    WBC 7.3 4.8 - 10.8 K/uL    RBC 2.72 (L) 4.20 - 5.40 M/uL    Hemoglobin 7.8 (L) 12.0 - 16.0 g/dL    Hematocrit 24.2 (L) 37.0 - 47.0 %    MCV 89.0 81.4 - 97.8 fL    MCH 28.7 27.0 - 33.0 pg    MCHC 32.2 32.2 - 35.5 g/dL    RDW 76.9 (H) 35.9 - 50.0 fL    Platelet Count 237 164 - 446 K/uL    MPV 10.7 9.0 - 12.9 fL    Neutrophils-Polys 92.20 (H) 44.00 - 72.00 %    Lymphocytes 2.60 (L) 22.00 - 41.00 %    Monocytes 3.50 0.00 - 13.40 %    Eosinophils 0.80 0.00 - 6.90 %    Basophils 0.00 0.00 - 1.80 %    Nucleated RBC 0.00 0.00 - 0.20 /100 WBC    Neutrophils (Absolute) 6.80 1.82 - 7.42 K/uL    Lymphs (Absolute) 0.19 (L) 1.00 - 4.80 K/uL    Monos (Absolute) 0.26 0.00 - 0.85 K/uL     Eos (Absolute) 0.06 0.00 - 0.51 K/uL    Baso (Absolute) 0.00 0.00 - 0.12 K/uL    NRBC (Absolute) 0.00 K/uL    Anisocytosis 1+     Macrocytosis 1+    Prothrombin Time    Collection Time: 09/14/24 12:15 AM   Result Value Ref Range    PT 16.1 (H) 12.0 - 14.6 sec    INR 1.27 (H) 0.87 - 1.13   ESTIMATED GFR    Collection Time: 09/14/24 12:15 AM   Result Value Ref Range    GFR (CKD-EPI) 137 >60 mL/min/1.73 m 2   DIFFERENTIAL MANUAL    Collection Time: 09/14/24 12:15 AM   Result Value Ref Range    Bands-Stabs 0.90 0.00 - 10.00 %    Manual Diff Status PERFORMED    PERIPHERAL SMEAR REVIEW    Collection Time: 09/14/24 12:15 AM   Result Value Ref Range    Peripheral Smear Review see below    PLATELET ESTIMATE    Collection Time: 09/14/24 12:15 AM   Result Value Ref Range    Plt Estimation Normal    MORPHOLOGY    Collection Time: 09/14/24 12:15 AM   Result Value Ref Range    RBC Morphology Present     Poikilocytosis 1+     Target Cells 1+     Toxic Vacuoles Few        Radiology Review:  [id number] VIEW   Final Result      1. Nonobstructive bowel gas pattern.   2. Interval decrease in amount of stool throughout the colon noted on the prior CT.   3. Biliary stent.      PC-VJYL-ZESIYHU STENT - TUBE   Final Result      Fluoroscopic guidance provided for ERCP. Total fluoroscopy time used was 1 minute and 5 seconds.      MR-ABDOMEN-WITH & W/O   Final Result      1.  Poor definition of pancreatic head mass.   2.  Artifact from biliary stent present.  Enhancing soft tissue at the proximal aspect concerning for tumor occlusion of the stent.   3.  Mild intrahepatic biliary dilation.   4.  Gas in the gallbladder, likely secondary to stent.   5.  Multiple liver lesions, primarily in the RIGHT lobe, most likely indicating metastasis.   6.  Small volume ascites.            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  He is willing to try a noncovered metal stent placement Monday, however I informed Bruce and her  that this would be a permanent stent and would never be able to be exchanged or removed  After discussion, we elected to follow the bilirubin in the morning.  She obviously has intrahepatic damage contributing to elevated bilirubin also and given that IR feels that the stent is functional they would like to wait to decide if that procedure needs to be done  Also discussed starting Megace for appetite stimulation  Continue twice daily MiraLAX, patient did not want any more [employer], lactulose worked for her so we will give one dose today  Encourage ambulation as tolerated    Plan discussed with patient and her , primary team, Dr. Chavez, Dr. Goodwin    ..Janina Mosquera, DNP,  APRN    Core Quality Measures   Reviewed items::  Labs, Medications and Radiology reports reviewed

## 2024-09-14 NOTE — CARE PLAN
The patient is Watcher - Medium risk of patient condition declining or worsening    Shift Goals  Clinical Goals: pain control, monitor labs  Patient Goals: pain control and rest  Family Goals: plan of care    Progress made toward(s) clinical / shift goals:Patient is A/OX4, patient is able to understand plan of care. All questions answered at this time. Pain is being controlled through PRN medications per the MAR. Patient is stating a comfortable pain level. Continuing bowel regimen to promote bowel movement. Patient remained free of falls throughout shift, fall precautions in place. Bed in lowest position, belongings and call light in reach.

## 2024-09-14 NOTE — CARE PLAN
The patient is Watcher - Medium risk of patient condition declining or worsening    Shift Goals  Clinical Goals: pain control, monitor bilirubin count  Patient Goals: pain control, rest  Family Goals: plan of care updates    Progress made toward(s) clinical / shift goals:      Problem: Pain - Standard  Goal: Alleviation of pain or a reduction in pain to the patient’s comfort goal  Outcome: Progressing     Problem: Knowledge Deficit - Standard  Goal: Patient and family/care givers will demonstrate understanding of plan of care, disease process/condition, diagnostic tests and medications  Outcome: Progressing     Problem: Fall Risk  Goal: Patient will remain free from falls  Outcome: Progressing

## 2024-09-15 PROBLEM — E87.1 HYPONATREMIA: Status: ACTIVE | Noted: 2024-09-15

## 2024-09-15 LAB
ALBUMIN SERPL BCP-MCNC: 2.7 G/DL (ref 3.2–4.9)
ALBUMIN/GLOB SERPL: 1 G/DL
ALP SERPL-CCNC: 2021 U/L (ref 30–99)
ALT SERPL-CCNC: 74 U/L (ref 2–50)
ANION GAP SERPL CALC-SCNC: 10 MMOL/L (ref 7–16)
AST SERPL-CCNC: 78 U/L (ref 12–45)
BASOPHILS # BLD AUTO: 0.4 % (ref 0–1.8)
BASOPHILS # BLD: 0.02 K/UL (ref 0–0.12)
BILIRUB SERPL-MCNC: 14.7 MG/DL (ref 0.1–1.5)
BUN SERPL-MCNC: 10 MG/DL (ref 8–22)
CALCIUM ALBUM COR SERPL-MCNC: 9.2 MG/DL (ref 8.5–10.5)
CALCIUM SERPL-MCNC: 8.2 MG/DL (ref 8.5–10.5)
CHLORIDE SERPL-SCNC: 99 MMOL/L (ref 96–112)
CO2 SERPL-SCNC: 22 MMOL/L (ref 20–33)
CREAT SERPL-MCNC: 0.42 MG/DL (ref 0.5–1.4)
EOSINOPHIL # BLD AUTO: 0.06 K/UL (ref 0–0.51)
EOSINOPHIL NFR BLD: 1.1 % (ref 0–6.9)
ERYTHROCYTE [DISTWIDTH] IN BLOOD BY AUTOMATED COUNT: 74.6 FL (ref 35.9–50)
GFR SERPLBLD CREATININE-BSD FMLA CKD-EPI: 110 ML/MIN/1.73 M 2
GLOBULIN SER CALC-MCNC: 2.7 G/DL (ref 1.9–3.5)
GLUCOSE SERPL-MCNC: 127 MG/DL (ref 65–99)
HCT VFR BLD AUTO: 23.7 % (ref 37–47)
HGB BLD-MCNC: 7.8 G/DL (ref 12–16)
IMM GRANULOCYTES # BLD AUTO: 0.05 K/UL (ref 0–0.11)
IMM GRANULOCYTES NFR BLD AUTO: 0.9 % (ref 0–0.9)
LYMPHOCYTES # BLD AUTO: 0.4 K/UL (ref 1–4.8)
LYMPHOCYTES NFR BLD: 7.3 % (ref 22–41)
MCH RBC QN AUTO: 28.9 PG (ref 27–33)
MCHC RBC AUTO-ENTMCNC: 32.9 G/DL (ref 32.2–35.5)
MCV RBC AUTO: 87.8 FL (ref 81.4–97.8)
MONOCYTES # BLD AUTO: 0.79 K/UL (ref 0–0.85)
MONOCYTES NFR BLD AUTO: 14.4 % (ref 0–13.4)
NEUTROPHILS # BLD AUTO: 4.15 K/UL (ref 1.82–7.42)
NEUTROPHILS NFR BLD: 75.9 % (ref 44–72)
NRBC # BLD AUTO: 0 K/UL
NRBC BLD-RTO: 0 /100 WBC (ref 0–0.2)
PLATELET # BLD AUTO: 229 K/UL (ref 164–446)
PMV BLD AUTO: 10.3 FL (ref 9–12.9)
POTASSIUM SERPL-SCNC: 3.7 MMOL/L (ref 3.6–5.5)
PROT SERPL-MCNC: 5.4 G/DL (ref 6–8.2)
RBC # BLD AUTO: 2.7 M/UL (ref 4.2–5.4)
SODIUM SERPL-SCNC: 131 MMOL/L (ref 135–145)
WBC # BLD AUTO: 5.5 K/UL (ref 4.8–10.8)

## 2024-09-15 PROCEDURE — 302151 K-PAD 14X20: Performed by: HOSPITALIST

## 2024-09-15 PROCEDURE — 80053 COMPREHEN METABOLIC PANEL: CPT

## 2024-09-15 PROCEDURE — 700101 HCHG RX REV CODE 250

## 2024-09-15 PROCEDURE — 99232 SBSQ HOSP IP/OBS MODERATE 35: CPT | Performed by: NURSE PRACTITIONER

## 2024-09-15 PROCEDURE — 770004 HCHG ROOM/CARE - ONCOLOGY PRIVATE *

## 2024-09-15 PROCEDURE — 700111 HCHG RX REV CODE 636 W/ 250 OVERRIDE (IP)

## 2024-09-15 PROCEDURE — A9270 NON-COVERED ITEM OR SERVICE: HCPCS

## 2024-09-15 PROCEDURE — 700102 HCHG RX REV CODE 250 W/ 637 OVERRIDE(OP)

## 2024-09-15 PROCEDURE — 99232 SBSQ HOSP IP/OBS MODERATE 35: CPT | Mod: GC | Performed by: HOSPITALIST

## 2024-09-15 PROCEDURE — 302151 K-PAD 14X20: Performed by: FAMILY MEDICINE

## 2024-09-15 PROCEDURE — 302131 K PAD MOTOR: Performed by: HOSPITALIST

## 2024-09-15 PROCEDURE — 302131 K PAD MOTOR: Performed by: FAMILY MEDICINE

## 2024-09-15 PROCEDURE — 85025 COMPLETE CBC W/AUTO DIFF WBC: CPT

## 2024-09-15 PROCEDURE — 700105 HCHG RX REV CODE 258

## 2024-09-15 RX ORDER — LACTULOSE 10 G/15ML
30 SOLUTION ORAL 3 TIMES DAILY
Status: DISCONTINUED | OUTPATIENT
Start: 2024-09-15 | End: 2024-09-17 | Stop reason: HOSPADM

## 2024-09-15 RX ADMIN — PANTOPRAZOLE SODIUM 40 MG: 40 INJECTION, POWDER, FOR SOLUTION INTRAVENOUS at 05:49

## 2024-09-15 RX ADMIN — SODIUM CHLORIDE: 9 INJECTION, SOLUTION INTRAVENOUS at 06:06

## 2024-09-15 RX ADMIN — SENNOSIDES AND DOCUSATE SODIUM 2 TABLET: 50; 8.6 TABLET ORAL at 17:47

## 2024-09-15 RX ADMIN — HYDROMORPHONE HYDROCHLORIDE 6 MG: 4 TABLET ORAL at 00:37

## 2024-09-15 RX ADMIN — PANCRELIPASE 24000 UNITS: 120000; 24000; 76000 CAPSULE, DELAYED RELEASE PELLETS ORAL at 08:29

## 2024-09-15 RX ADMIN — HYDROMORPHONE HYDROCHLORIDE 4 MG: 4 TABLET ORAL at 16:16

## 2024-09-15 RX ADMIN — HYDROMORPHONE HYDROCHLORIDE 1 MG: 1 INJECTION, SOLUTION INTRAMUSCULAR; INTRAVENOUS; SUBCUTANEOUS at 02:13

## 2024-09-15 RX ADMIN — HYDROMORPHONE HYDROCHLORIDE 6 MG: 4 TABLET ORAL at 10:52

## 2024-09-15 RX ADMIN — PANCRELIPASE 24000 UNITS: 120000; 24000; 76000 CAPSULE, DELAYED RELEASE PELLETS ORAL at 17:48

## 2024-09-15 RX ADMIN — PANCRELIPASE 24000 UNITS: 120000; 24000; 76000 CAPSULE, DELAYED RELEASE PELLETS ORAL at 12:30

## 2024-09-15 RX ADMIN — HYDROMORPHONE HYDROCHLORIDE 4 MG: 4 TABLET ORAL at 21:37

## 2024-09-15 RX ADMIN — POLYETHYLENE GLYCOL 3350 1 PACKET: 17 POWDER, FOR SOLUTION ORAL at 17:47

## 2024-09-15 RX ADMIN — MAGNESIUM HYDROXIDE 30 ML: 1200 LIQUID ORAL at 05:49

## 2024-09-15 RX ADMIN — POLYETHYLENE GLYCOL 3350 1 PACKET: 17 POWDER, FOR SOLUTION ORAL at 05:49

## 2024-09-15 RX ADMIN — LIDOCAINE AND PRILOCAINE 2 ML: 25; 25 CREAM TOPICAL at 04:50

## 2024-09-15 RX ADMIN — MEGESTROL ACETATE 400 MG: 40 SUSPENSION ORAL at 05:50

## 2024-09-15 ASSESSMENT — ENCOUNTER SYMPTOMS
BLURRED VISION: 0
CHILLS: 0
FEVER: 0
CONSTIPATION: 1
COUGH: 0
NAUSEA: 0
HEARTBURN: 0
DIARRHEA: 0
SHORTNESS OF BREATH: 0
WEAKNESS: 1
ABDOMINAL PAIN: 1
BACK PAIN: 0
BLOOD IN STOOL: 0
DEPRESSION: 0
DIZZINESS: 0
VOMITING: 0

## 2024-09-15 ASSESSMENT — PAIN DESCRIPTION - PAIN TYPE
TYPE: ACUTE PAIN

## 2024-09-15 ASSESSMENT — FIBROSIS 4 INDEX: FIB4 SCORE: 2.45

## 2024-09-15 NOTE — CARE PLAN
The patient is Watcher - Medium risk of patient condition declining or worsening    Shift Goals  Clinical Goals: Pt will have pain <5  Patient Goals: pain control and comfort  Family Goals: plan of care and comfort    Progress made toward(s) clinical / shift goals: Patient is A/OX4, patient is able to understand plan of care. All questions answered at this time. Pain is being controlled through PRN medications per the MAR. Patient is stating a comfortable pain level. Patient remained free of falls throughout shift, fall precautions in place. Bed in lowest position, belongings and call light in reach.

## 2024-09-15 NOTE — PROGRESS NOTES
Patient disconnected from IV fluids at night. Patient refuses heparin lock or TKO fluids for port despite education on hospital policy and associated risks. Danial Galeana notified.

## 2024-09-15 NOTE — ASSESSMENT & PLAN NOTE
Newly developed since admission.  Unlikely to be hypovolemic as patient has been getting continuous maintenance fluids for a number of days.  Could be due to hypervolemia. Possibly SIADH from opioid use, though patient has been on them for some time.  However, recent increase in dosage so still in consideration.  Patient asymptomatic currently.  Continue to monitor.  - Daily CMP

## 2024-09-15 NOTE — PROGRESS NOTES
..Gastroenterology Progress Note               Author:  Janina Mosquera, AUGUSTINE,  APRN Date & Time Created: 9/15/2024 7:51 AM       Patient ID:  Name:             Bruce Wilson  YOB: 1960  Age:                 64 y.o.  female  MRN:               [id number]      Medical Decision Making, by Problem:  Active Hospital Problems    Diagnosis     Hyponatremia [E87.1]     Anemia [D64.9]     Abdominal pain [R10.9]     Moderate malnutrition (HCC) [E44.0]     Pancreatic cancer metastasized to liver (HCC) [C25.9, C78.7]      Presenting Chief Complaint:  Jaundice     History of Present Illness:   64-year-old female transferred from [facility] for jaundice.  Patient has a history of pancreatic cancer.  She was in the area visiting with an oncologist who was going to pursue a nontraditional treatment for her pancreatic cancer as best I understand.  She was noticed to be jaundiced.  She went to [facility].  She was transferred here.  Patient has had a metal stent placed in her bile duct.  It is unclear whether this was covered or uncovered.  This was done in [place].  Her  may have records related to that.  Patient denies fevers.  ROS per admitting HPI otherwise.  No changes.     The patient had an EGD done on 8/12/2024.  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    ERCP 10/24/2023 at [facility]:  Impression: - Upper endoscopy revealed a normal esophagus and stomach. A medium to large ulcerated mass was seen in the duodenal bulb with luminal stenosis at the D1/D2   junction. The duodenal stricture was traversed without difficulty (this lesion was previously biopsied at [facility] on 10/20/2023).  - Cholangiogram images revealed a severe stricture of the distal common bile duct in the intrapancreatic portion. The stricture measured approximately 1.5 to 2 cm in length. The common duct and intrahepatic ducts are dilated upstream. The cystic duct is patent.  - A biliary sphincterotomy was  performed.  - Successful placement of a 10 mm x 4 cm fully-covered WallFlex expandable metal biliary stent.     Final path moderately differentiated adenocarcinoma with final cytology from FNA of celiac axis lymph nodes revealed metastatic adenocarcinoma    Nov 13, 2023: Started neoadjuvant FOLFIRINOX.  After 8 doses, based on tumor markers and suspicion of a liver lesion, deemed to have progression.  Switched to gemcitabine and nab-paclitaxel.  Then had chemoradiation.  Evaluation then revealed unresectable tumor. This was with Dr. Ramey in consultation with [facility].     9/11/2024: ERCP  IMPRESSION:  -Antral telangiectasias, diminutive.  Favor radiation effect.  Nonbleeding.  -Ulcer at the duodenal apex with stenosis, dilated with TTS balloon to 13.5 mm.  -Biliary stent removal with replacement (10 mm x 4 cm fully covered self-expanding metallic stent)    Interval History:  9/12/2024: Patient seen in collaboration with the primary team,  also at bedside.  Continues to complain of constipation and fatigue and does not feel ready to go home.  Pain controlled on current regimen.  Discussed ERCP findings and current lab results.  AST/ALT minimally decreased, alk phos and T. Bili uptrending now 10>7    9/13/2024: Patient seen with  and daughter at bedside.  Worked in collaboration with Dr. Chavez, Dr. Solo and Dr. Zamora from primary team to present current images and plan of care.  Dr. Chavez went through detailed description of current pathophysiology and anatomical challenges.     Labs reviewed, hemoglobin 7.8, alk phos and T. bili increased, now 13.7> 10. Last BM yesterday morning    9/14/2024: Patient seen with  bedside in collaboration with Dr. Zamora from primary team.  MRI completed overnight, read pending  T. bili continues to uptrend now 14.4 however alk phos decreasing  No documented BM since 9/12    9/15/2024: Patient seen with her . Had a BM today, still with poor appetite. T.  Bili now 14.7>14.45 with alk phos increase    Hospital Medications:  Current Facility-Administered Medications   Medication Dose Frequency Provider Last Rate Last Admin    megestrol (Megace) 40 MG/ML suspension 400 mg  400 mg DAILY Watson Zamora M.D.   400 mg at 09/15/24 0550    lidocaine-prilocaine (Emla) 2.5-2.5 % cream   PRN Danial Galeana M.D.   2 mL at 09/15/24 0450    pancrelipase (Lip-Prot-Amyl) (Creon 55239) 87597-02628 units capsule 24,000 Units  1 Capsule TID WITH MEALS Brandie Joseph, PharmD   24,000 Units at 09/14/24 1817    pancrelipase (Lip-Prot-Amyl) (Creon 6000) 6000-34089 units capsule 12,000 Units  2 Capsule With Snacks PRN Brandie Joseph, PharmD        pantoprazole (Protonix) injection 40 mg  40 mg DAILY Ila Pacheco D.O.   40 mg at 09/15/24 0549    HYDROmorphone (Dilaudid) tablet 4 mg  4 mg Q3HRS PRN Ila Pacheco D.O.   4 mg at 09/14/24 2123    Or    HYDROmorphone (Dilaudid) tablet 6 mg  6 mg Q3HRS PRN Ila Pacheco D.O.   6 mg at 09/15/24 0037    Or    HYDROmorphone (Dilaudid) injection 1 mg  1 mg Q3HRS PRN SILVIA Villatoro.O.   1 mg at 09/15/24 0213    senna-docusate (Pericolace Or Senokot S) 8.6-50 MG per tablet 2 Tablet  2 Tablet Q EVENING SILVIA Villatoro.O.   2 Tablet at 09/14/24 1817    And    polyethylene glycol/lytes (Miralax) Packet 1 Packet  1 Packet BID SILVIA Villatoro.O.   1 Packet at 09/15/24 0549    magnesium hydroxide (Milk Of Magnesia) suspension 30 mL  30 mL DAILY SILVIA Villatoro.O.   30 mL at 09/15/24 0549    Pharmacy Consult Request ...Pain Management Review 1 Each  1 Each PHARMACY TO DOSE Ila Pacheco D.O.        ondansetron (Zofran) syringe/vial injection 4 mg  4 mg Q3HRS PRN Ila Pacheco D.O.   4 mg at 09/13/24 0033    Pharmacy Consult Request ...Pain Management Review 1 Each  1 Each PHARMACY TO DOSE Yovani Carrillo M.D.       Last reviewed on 9/11/2024 11:10 AM by Zachariah Gilmore D.O.       Review  "of Systems:  Review of Systems   Constitutional:  Positive for malaise/fatigue. Negative for chills and fever.   HENT:  Negative for hearing loss.    Eyes:  Negative for blurred vision.   Respiratory:  Negative for cough and shortness of breath.    Cardiovascular:  Negative for chest pain and leg swelling.   Gastrointestinal:  Positive for abdominal pain and constipation. Negative for blood in stool, diarrhea, heartburn, melena, nausea and vomiting.   Genitourinary:  Negative for dysuria.   Musculoskeletal:  Negative for back pain.   Skin:  Negative for rash.   Neurological:  Positive for weakness. Negative for dizziness.   Psychiatric/Behavioral:  Negative for depression.    All other systems reviewed and are negative.    Vital signs:  Weight/BMI: Body mass index is 17.9 kg/m².  /51   Pulse 60   Temp 37.4 °C (99.3 °F) (Temporal)   Resp 16   Ht 1.575 m (5' 2\")   Wt 44.4 kg (97 lb 14.2 oz)   SpO2 99%   Vitals:    09/14/24 1518 09/14/24 1912 09/15/24 0450 09/15/24 0725   BP: 105/68 118/64 99/52 108/51   Pulse: 66 70 74 60   Resp: 17 16 18 16   Temp: 36.9 °C (98.5 °F) 37.1 °C (98.8 °F) 37 °C (98.6 °F) 37.4 °C (99.3 °F)   TempSrc: Temporal Temporal Temporal Temporal   SpO2: 96% 100% 96% 99%   Weight:       Height:         Oxygen Therapy:  Pulse Oximetry: 99 %, O2 (LPM): 0, O2 Delivery Device: None - Room Air  No intake or output data in the 24 hours ending 09/15/24 0751      Physical Exam  Vitals and nursing note reviewed.   Constitutional:       General: She is not in acute distress.     Appearance: She is ill-appearing.   HENT:      Head: Normocephalic and atraumatic.      Right Ear: External ear normal.      Left Ear: External ear normal.      Nose: Nose normal.      Mouth/Throat:      Mouth: Mucous membranes are moist.      Pharynx: Oropharynx is clear.   Eyes:      General: Scleral icterus present.   Cardiovascular:      Rate and Rhythm: Normal rate and regular rhythm.      Pulses: Normal pulses.      " Heart sounds: Normal heart sounds.   Pulmonary:      Effort: Pulmonary effort is normal. No respiratory distress.      Breath sounds: Normal breath sounds.   Abdominal:      General: Abdomen is flat. Bowel sounds are normal. There is no distension.      Palpations: Abdomen is soft.      Tenderness: There is no abdominal tenderness.   Musculoskeletal:         General: Normal range of motion.      Cervical back: Normal range of motion.   Skin:     General: Skin is warm and dry.      Capillary Refill: Capillary refill takes less than 2 seconds.      Coloration: Skin is jaundiced.   Neurological:      Mental Status: She is alert and oriented to person, place, and time.      Motor: Weakness present.   Psychiatric:         Mood and Affect: Mood normal.         Behavior: Behavior normal.       Labs:  Recent Labs     09/13/24 0040 09/14/24  0015 09/15/24  0042   SODIUM 136 134* 131*   POTASSIUM 3.5* 3.7 3.7   CHLORIDE 100 102 99   CO2 20 21 22   BUN 8 12 10   CREATININE <0.17* <0.17* 0.42*   CALCIUM 8.5 8.6 8.2*     Recent Labs     09/13/24 0040 09/14/24  0015 09/15/24  0042   ALTSGPT 92* 69* 74*   ASTSGOT 95* 56* 78*   ALKPHOSPHAT 2194* 1794* 2021*   TBILIRUBIN 13.7* 14.4* 14.7*   GLUCOSE 116* 133* 127*     Recent Labs     09/13/24 0040 09/14/24  0015 09/15/24  0042   WBC 8.8 7.3 5.5   NEUTSPOLYS 89.00* 92.20* 75.90*   LYMPHOCYTES 3.40* 2.60* 7.30*   MONOCYTES 6.80 3.50 14.40*   EOSINOPHILS 0.00 0.80 1.10   BASOPHILS 0.00 0.00 0.40   ASTSGOT 95* 56* 78*   ALTSGPT 92* 69* 74*   ALKPHOSPHAT 2194* 1794* 2021*   TBILIRUBIN 13.7* 14.4* 14.7*     Recent Labs     09/13/24 0040 09/14/24  0015 09/15/24  0042   RBC 2.73* 2.72* 2.70*   HEMOGLOBIN 7.8* 7.8* 7.8*   HEMATOCRIT 23.6* 24.2* 23.7*   PLATELETCT 223 237 229   PROTHROMBTM  --  16.1*  --    INR  --  1.27*  --      Recent Results (from the past 24 hour(s))   CBC WITH DIFFERENTIAL    Collection Time: 09/15/24 12:42 AM   Result Value Ref Range    WBC 5.5 4.8 - 10.8 K/uL     RBC 2.70 (L) 4.20 - 5.40 M/uL    Hemoglobin 7.8 (L) 12.0 - 16.0 g/dL    Hematocrit 23.7 (L) 37.0 - 47.0 %    MCV 87.8 81.4 - 97.8 fL    MCH 28.9 27.0 - 33.0 pg    MCHC 32.9 32.2 - 35.5 g/dL    RDW 74.6 (H) 35.9 - 50.0 fL    Platelet Count 229 164 - 446 K/uL    MPV 10.3 9.0 - 12.9 fL    Neutrophils-Polys 75.90 (H) 44.00 - 72.00 %    Lymphocytes 7.30 (L) 22.00 - 41.00 %    Monocytes 14.40 (H) 0.00 - 13.40 %    Eosinophils 1.10 0.00 - 6.90 %    Basophils 0.40 0.00 - 1.80 %    Immature Granulocytes 0.90 0.00 - 0.90 %    Nucleated RBC 0.00 0.00 - 0.20 /100 WBC    Neutrophils (Absolute) 4.15 1.82 - 7.42 K/uL    Lymphs (Absolute) 0.40 (L) 1.00 - 4.80 K/uL    Monos (Absolute) 0.79 0.00 - 0.85 K/uL    Eos (Absolute) 0.06 0.00 - 0.51 K/uL    Baso (Absolute) 0.02 0.00 - 0.12 K/uL    Immature Granulocytes (abs) 0.05 0.00 - 0.11 K/uL    NRBC (Absolute) 0.00 K/uL   Comp Metabolic Panel    Collection Time: 09/15/24 12:42 AM   Result Value Ref Range    Sodium 131 (L) 135 - 145 mmol/L    Potassium 3.7 3.6 - 5.5 mmol/L    Chloride 99 96 - 112 mmol/L    Co2 22 20 - 33 mmol/L    Anion Gap 10.0 7.0 - 16.0    Glucose 127 (H) 65 - 99 mg/dL    Bun 10 8 - 22 mg/dL    Creatinine 0.42 (L) 0.50 - 1.40 mg/dL    Calcium 8.2 (L) 8.5 - 10.5 mg/dL    Correct Calcium 9.2 8.5 - 10.5 mg/dL    AST(SGOT) 78 (H) 12 - 45 U/L    ALT(SGPT) 74 (H) 2 - 50 U/L    Alkaline Phosphatase 2021 (H) 30 - 99 U/L    Total Bilirubin 14.7 (H) 0.1 - 1.5 mg/dL    Albumin 2.7 (L) 3.2 - 4.9 g/dL    Total Protein 5.4 (L) 6.0 - 8.2 g/dL    Globulin 2.7 1.9 - 3.5 g/dL    A-G Ratio 1.0 g/dL   ESTIMATED GFR    Collection Time: 09/15/24 12:42 AM   Result Value Ref Range    GFR (CKD-EPI) 110 >60 mL/min/1.73 m 2       Radiology Review:  [id number] VIEW   Final Result      1. Nonobstructive bowel gas pattern.   2. Interval decrease in amount of stool throughout the colon noted on the prior CT.   3. Biliary stent.      [id number] STENT - TUBE   Final Result      Fluoroscopic  guidance provided for ERCP. Total fluoroscopy time used was 1 minute and 5 seconds.      MR-ABDOMEN-WITH & W/O   Final Result      1.  Poor definition of pancreatic head mass.   2.  Artifact from biliary stent present.  Enhancing soft tissue at the proximal aspect concerning for tumor occlusion of the stent.   3.  Mild intrahepatic biliary dilation.   4.  Gas in the gallbladder, likely secondary to stent.   5.  Multiple liver lesions, primarily in the RIGHT lobe, most likely indicating metastasis.   6.  Small volume ascites.            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  He is willing to try a noncovered metal stent placement Monday, however I informed Bruce and her  that this would be a permanent stent and would never be able to be exchanged or removed  Further discussions with advanced endoscopist today to include chart review of previous ERCP procedure  Also discussed starting Megace for appetite stimulation  Continue twice daily MiraLAX, patient did not want any more Relistor, lactulose works for her so continue dose as needed for constipation    Encourage ambulation as tolerated    Plan discussed with patient and her , primary team, Dr. Chavez, Dr. Partida    ..Janina Mosquera, AUGUSTINE,  APRN    Core Quality Measures   Reviewed items::  Labs, Medications and Radiology reports reviewed

## 2024-09-15 NOTE — CARE PLAN
The patient is Watcher - Medium risk of patient condition declining or worsening    Shift Goals  Clinical Goals: Patient will have bilirubin trend toward normal limits  Patient Goals: Patient will have pain controlled and rest comfortbaly throughout the shift  Family Goals: Patient comfort and remain updated on POC    Progress made toward(s) clinical / shift goals:      Problem: Pain - Standard  Goal: Alleviation of pain or a reduction in pain to the patient’s comfort goal  Outcome: Progressing   Patient medicated per MAR for pain. Patient requiring breakthrough medication. Patient assisted with repositioning as needed. Heated K-pad ordered. Patient resting comfortably after interventions.     Problem: Fall Risk  Goal: Patient will remain free from falls  Outcome: Progressing   Patient educated on fall prevention and demonstrates understanding. Patient resting in chair overnight. Chair locked. Patient belongings and call light within reach.  at bedside assisting patient. Patient calling appropriately for assistance.     Problem: Bowel/Gastric:  Goal: Normal bowel function is maintained or improved  Outcome: Progressing   Patient with no bowel movement. Bowel protocol meds in place.     Patient is not progressing towards the following goals: N/a

## 2024-09-15 NOTE — PROGRESS NOTES
[facility] MEDICINE PROGRESS NOTE        Attending:   Dr. Mcgraw    Resident:   Watson Zamora M.D.    PATIENT:   Bruce Wilson; [id number]; 1960    ID:   64 y.o. female admitted for biliary stent obstruction in the setting of stage IV pancreatic cancer with mets    SUBJECTIVE:   No acute events overnight, followed up with patient and family yesterday afternoon with GI present and discussed that IR was not looking to place biliary drain at this time.  However given that the alk phos had started to decrease, plan is currently to wait until Monday 9/16 for Dr. Partida to put in a bare-metal stent potentially.  Left patient and family alone to discuss this.      Today patient states that she is feeling okay.  Is eating [employer] oatmeal and states that he is better than her hospital food.  She was added on Megace for appetite that she previously took at home, this has not kicked in yet said it typically takes her a few days to see effect.  She has not had bowel movement despite addition of lactulose yesterday afternoon.  Pain is well-controlled however, patient recently took 6 mg of Dilaudid and states that this is effective for her.  They are planning to visit with GI today and discuss plan for possible ERCP on Monday 9/16.    OBJECTIVE:  Vitals:    09/14/24 1912 09/15/24 0450 09/15/24 0725 09/15/24 1100   BP: 118/64 99/52 108/51    Pulse: 70 74 60    Resp: 16 18 16    Temp: 37.1 °C (98.8 °F) 37 °C (98.6 °F) 37.4 °C (99.3 °F)    TempSrc: Temporal Temporal Temporal    SpO2: 100% 96% 99%    Weight:    52.2 kg (115 lb 1.3 oz)   Height:         No intake or output data in the 24 hours ending 09/13/24 0511    PHYSICAL EXAM:   General: Jaundiced, cachectic on exam, sitting comfortably in chair  HEENT: Scleral icterus present, extraocular movements intact  Cardiovascular: RRR without murmurs. Normal capillary refill   Respiratory: CTAB  Abdomen: soft, mildly tender, mildly distended, improved from prior, no masses  EXT:   BOB, trace ankle edema  Skin: No erythema/lesions   Neuro: Non-focal    LABS:  Recent Labs     09/13/24 0040 09/14/24  0015 09/15/24  0042   WBC 8.8 7.3 5.5   RBC 2.73* 2.72* 2.70*   HEMOGLOBIN 7.8* 7.8* 7.8*   HEMATOCRIT 23.6* 24.2* 23.7*   MCV 86.4 89.0 87.8   MCH 28.6 28.7 28.9   RDW 76.8* 76.9* 74.6*   PLATELETCT 223 237 229   MPV 9.2 10.7 10.3   NEUTSPOLYS 89.00* 92.20* 75.90*   LYMPHOCYTES 3.40* 2.60* 7.30*   MONOCYTES 6.80 3.50 14.40*   EOSINOPHILS 0.00 0.80 1.10   BASOPHILS 0.00 0.00 0.40   RBCMORPHOLO Present Present  --      Recent Labs     09/13/24  0040 09/14/24  0015 09/15/24  0042   SODIUM 136 134* 131*   POTASSIUM 3.5* 3.7 3.7   CHLORIDE 100 102 99   CO2 20 21 22   BUN 8 12 10   CREATININE <0.17* <0.17* 0.42*   CALCIUM 8.5 8.6 8.2*   ALBUMIN 2.9* 2.7* 2.7*     Estimated GFR/CRCL = Estimated Creatinine Clearance: 109.8 mL/min (A) (by C-G formula based on SCr of 0.42 mg/dL (L)).  Recent Labs     09/13/24  0040 09/14/24  0015 09/15/24  0042   GLUCOSE 116* 133* 127*     Recent Labs     09/13/24  0040 09/14/24 0015 09/15/24  0042   ASTSGOT 95* 56* 78*   ALTSGPT 92* 69* 74*   TBILIRUBIN 13.7* 14.4* 14.7*   ALKPHOSPHAT 2194* 1794* 2021*   GLOBULIN 2.8 2.9 2.7   INR  --  1.27*  --              Recent Labs     09/14/24 0015   INR 1.27*         IMAGING:  OO-ITAULGK-T/O   Final Result      1.  Pneumobilia. Slight improvement in biliary dilatation compared to prior study.   2.  Air within the CBD stent.   3.  Pancreatic parenchyma is not well evaluated due to CBD stent and lack of IV contrast.   4.  Redemonstration of bilobar hepatic lesions concerning for hepatic metastases.   5.  No gallstones. Small amount of air within the gallbladder.   6.  Small volume ascites.   7.  Small right pleural effusion and associated atelectasis.      GX-[id number]-1 VIEW   Final Result      1. Nonobstructive bowel gas pattern.   2. Interval decrease in amount of stool throughout the colon noted on the prior CT.   3. Biliary  stent.      [id number] STENT - TUBE   Final Result      Fluoroscopic guidance provided for ERCP. Total fluoroscopy time used was 1 minute and 5 seconds.      MR-ABDOMEN-WITH & W/O   Final Result      1.  Poor definition of pancreatic head mass.   2.  Artifact from biliary stent present.  Enhancing soft tissue at the proximal aspect concerning for tumor occlusion of the stent.   3.  Mild intrahepatic biliary dilation.   4.  Gas in the gallbladder, likely secondary to stent.   5.  Multiple liver lesions, primarily in the RIGHT lobe, most likely indicating metastasis.   6.  Small volume ascites.            OUTSIDE IMAGES-CT ABDOMEN /PELVIS   Final Result          MEDS:  Current Facility-Administered Medications   Medication Last Admin    lactulose 20 GM/30ML solution 30 mL      megestrol (Megace) 40 MG/ML suspension 400 mg 400 mg at 09/15/24 0550    lidocaine-prilocaine (Emla) 2.5-2.5 % cream 2 mL at 09/15/24 0450    pancrelipase (Lip-Prot-Amyl) (Creon 78535) 98675-28406 units capsule 24,000 Units 24,000 Units at 09/15/24 0829    pancrelipase (Lip-Prot-Amyl) (Creon 6000) 6000-92623 units capsule 12,000 Units      pantoprazole (Protonix) injection 40 mg 40 mg at 09/15/24 0549    HYDROmorphone (Dilaudid) tablet 4 mg 4 mg at 09/14/24 2123    Or    HYDROmorphone (Dilaudid) tablet 6 mg 6 mg at 09/15/24 1052    Or    HYDROmorphone (Dilaudid) injection 1 mg 1 mg at 09/15/24 0213    senna-docusate (Pericolace Or Senokot S) 8.6-50 MG per tablet 2 Tablet 2 Tablet at 09/14/24 1817    And    polyethylene glycol/lytes (Miralax) Packet 1 Packet 1 Packet at 09/15/24 0549    magnesium hydroxide (Milk Of Magnesia) suspension 30 mL 30 mL at 09/15/24 0549    Pharmacy Consult Request ...Pain Management Review 1 Each      ondansetron (Zofran) syringe/vial injection 4 mg 4 mg at 09/13/24 0033    Pharmacy Consult Request ...Pain Management Review 1 Each         ASSESSMENT/PLAN:    * Abdominal pain  Assessment & Plan  MRCP indicated  Artifact from biliary stent present. Enhancing soft tissue at the proximal aspect concerning for tumor occlusion of the stent.  GI recommended patient proceed with ERCP to help resolve this occlusion.  Patient with increasing pain control with 6 mg Dilaudid with some breakthrough pain postoperatively 9/12.  Patient with increasing pain from constipation, concern of ileus. KUB ordered WNL.  Patient treated with lactulose a.m. 9/12 with 2 successful bowel movements.  Likely source of pain is biliary obstruction versus constipation still.  Difficult to interpret as Zofran and hydromorphone which helps some symptoms can also contribute to constipation.  Will attempt to optimize to find middle ground.  Discussion with patient and family about risk of ileus with lactulose.  They would like a prescription to be used only in emergencies if patient is unable to have bowel movements and is having significant pain.  Patient having continued constipation while inpatient despite addition of lactulose for second dose.  Will add lactulose and titrate to desired frequency/consistency of BM's    Plan:  - Lactulose 30mL up to TID, titrate to 1 BM/day  - Hydromorphone 4 to 6 mg every 3 hours as needed  -Zofran 4 mg every 3 hours as needed for nausea  - cont bowel regimen with 17g Miralax BID, milk of magnesia, senna  -Continue to monitor bilirubin levels  -Consider lactulose prescription on discharge    Hyponatremia  Assessment & Plan  Newly developed since admission.  Unlikely to be hypovolemic as patient has been getting continuous maintenance fluids for a number of days.  Could be due to hypervolemia. Possibly SIADH from opioid use, though patient has been on them for some time.  However, recent increase in dosage so still in consideration.  Patient asymptomatic currently.  Continue to monitor.  - Daily CMP    Anemia  Assessment & Plan  Stable.  Patient with hemoglobin drop from 7.5-6.6 9/11, patient had symptoms of weakness.   Transfused with resolution, 7.8 on 9/13, 7.3 on 9/14.    Plan  -Continue to monitor hemoglobin with a.m. CBC transfuse hemoglobin less than 7    Moderate malnutrition (HCC)  Assessment & Plan  Per ASPEN guidelines.  Patient says that she is not hungry even though food does sound good does not taste good.  - Continue Megace for appetite stimulation    Pancreatic cancer metastasized to liver (HCC)- (present on admission)  Assessment & Plan  Metastatic pancreatic cancer with lesions to the liver, lungs and possible retroperitoneal involvement per CT abdomen on admission.  Patient is s/p biliary stent placement with ongoing chemotherapy treatment.  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  MRCP with findings of enhancing soft tissue at proximal aspect of biliary stent concerning for tumor occlusion.  Patient underwent ERCP on 9/11.  After speaking with IR and GI on 9/13, concerned that tumor is still obstructing stent as his bilirubin is continuing to increase.  Also possible this could be reactionary in the postop setting.  If obstructed, consideration would have to be made for drain placement however given the location of the tumor on imaging it is likely that multiple drains would have to be placed in order to alleviate obstruction.  Repeat MRCP ordered on 9/13 for up-to-date imaging prior to shared decision making regarding drain placement versus attempted repeat ERCP. bilirubin continues to trend upward.    Plan:  - Follow-up GI recs for ERCP if indicated  - Pain management, cont to titrate to pain control  - Stop maintenance IV fluids, patient to receive 1 L of fluids at 83 cc/h daily (12 hours total) and support further maintenance with p.o. hydration in order to prevent fluid overload  - Patient with improved eating after addition of home meds.,  Continue Protonix and Creon.         Core Measures  IV Fluids: Daily 1 L @83cc/hr   Antbx: None  DVT ppx:   SCDs  Code status: Full  Dispo: inpatient, pending GI and/or IR intervention    Watson Zamora MD  [facility]  PGY-1

## 2024-09-16 ENCOUNTER — PHARMACY VISIT (OUTPATIENT)
Dept: PHARMACY | Facility: MEDICAL CENTER | Age: 64
End: 2024-09-16
Payer: COMMERCIAL

## 2024-09-16 VITALS
RESPIRATION RATE: 16 BRPM | HEIGHT: 62 IN | SYSTOLIC BLOOD PRESSURE: 89 MMHG | OXYGEN SATURATION: 97 % | DIASTOLIC BLOOD PRESSURE: 57 MMHG | WEIGHT: 115.08 LBS | TEMPERATURE: 99.2 F | BODY MASS INDEX: 21.18 KG/M2 | HEART RATE: 63 BPM

## 2024-09-16 LAB
ALBUMIN SERPL BCP-MCNC: 2.7 G/DL (ref 3.2–4.9)
ALBUMIN/GLOB SERPL: 1.1 G/DL
ALP SERPL-CCNC: 1966 U/L (ref 30–99)
ALT SERPL-CCNC: 68 U/L (ref 2–50)
ANION GAP SERPL CALC-SCNC: 12 MMOL/L (ref 7–16)
AST SERPL-CCNC: 86 U/L (ref 12–45)
BASOPHILS # BLD AUTO: 0.6 % (ref 0–1.8)
BASOPHILS # BLD: 0.03 K/UL (ref 0–0.12)
BILIRUB SERPL-MCNC: 14.4 MG/DL (ref 0.1–1.5)
BUN SERPL-MCNC: 7 MG/DL (ref 8–22)
CALCIUM ALBUM COR SERPL-MCNC: 9.2 MG/DL (ref 8.5–10.5)
CALCIUM SERPL-MCNC: 8.2 MG/DL (ref 8.5–10.5)
CHLORIDE SERPL-SCNC: 97 MMOL/L (ref 96–112)
CO2 SERPL-SCNC: 22 MMOL/L (ref 20–33)
CREAT SERPL-MCNC: <0.17 MG/DL (ref 0.5–1.4)
EOSINOPHIL # BLD AUTO: 0.04 K/UL (ref 0–0.51)
EOSINOPHIL NFR BLD: 0.8 % (ref 0–6.9)
ERYTHROCYTE [DISTWIDTH] IN BLOOD BY AUTOMATED COUNT: 72.2 FL (ref 35.9–50)
GFR SERPLBLD CREATININE-BSD FMLA CKD-EPI: 137 ML/MIN/1.73 M 2
GLOBULIN SER CALC-MCNC: 2.5 G/DL (ref 1.9–3.5)
GLUCOSE SERPL-MCNC: 110 MG/DL (ref 65–99)
HCT VFR BLD AUTO: 24.9 % (ref 37–47)
HGB BLD-MCNC: 8.2 G/DL (ref 12–16)
IMM GRANULOCYTES # BLD AUTO: 0.09 K/UL (ref 0–0.11)
IMM GRANULOCYTES NFR BLD AUTO: 1.8 % (ref 0–0.9)
INR PPP: 1.13 (ref 0.87–1.13)
LYMPHOCYTES # BLD AUTO: 0.53 K/UL (ref 1–4.8)
LYMPHOCYTES NFR BLD: 10.6 % (ref 22–41)
MCH RBC QN AUTO: 28.5 PG (ref 27–33)
MCHC RBC AUTO-ENTMCNC: 32.9 G/DL (ref 32.2–35.5)
MCV RBC AUTO: 86.5 FL (ref 81.4–97.8)
MONOCYTES # BLD AUTO: 0.77 K/UL (ref 0–0.85)
MONOCYTES NFR BLD AUTO: 15.5 % (ref 0–13.4)
NEUTROPHILS # BLD AUTO: 3.52 K/UL (ref 1.82–7.42)
NEUTROPHILS NFR BLD: 70.7 % (ref 44–72)
NRBC # BLD AUTO: 0 K/UL
NRBC BLD-RTO: 0 /100 WBC (ref 0–0.2)
PLATELET # BLD AUTO: 267 K/UL (ref 164–446)
PMV BLD AUTO: 10.5 FL (ref 9–12.9)
POTASSIUM SERPL-SCNC: 3.6 MMOL/L (ref 3.6–5.5)
PROT SERPL-MCNC: 5.2 G/DL (ref 6–8.2)
PROTHROMBIN TIME: 14.6 SEC (ref 12–14.6)
RBC # BLD AUTO: 2.88 M/UL (ref 4.2–5.4)
SODIUM SERPL-SCNC: 131 MMOL/L (ref 135–145)
WBC # BLD AUTO: 5 K/UL (ref 4.8–10.8)

## 2024-09-16 PROCEDURE — 99232 SBSQ HOSP IP/OBS MODERATE 35: CPT | Performed by: NURSE PRACTITIONER

## 2024-09-16 PROCEDURE — A9270 NON-COVERED ITEM OR SERVICE: HCPCS

## 2024-09-16 PROCEDURE — 85610 PROTHROMBIN TIME: CPT

## 2024-09-16 PROCEDURE — 700101 HCHG RX REV CODE 250

## 2024-09-16 PROCEDURE — RXOTC WILLOW AMBULATORY OTC CHARGE

## 2024-09-16 PROCEDURE — 700111 HCHG RX REV CODE 636 W/ 250 OVERRIDE (IP)

## 2024-09-16 PROCEDURE — 306313 ANTI-EMBOLISM STOCKINGS XXXLG REG: Performed by: FAMILY MEDICINE

## 2024-09-16 PROCEDURE — 700102 HCHG RX REV CODE 250 W/ 637 OVERRIDE(OP)

## 2024-09-16 PROCEDURE — 85025 COMPLETE CBC W/AUTO DIFF WBC: CPT

## 2024-09-16 PROCEDURE — 99238 HOSP IP/OBS DSCHRG MGMT 30/<: CPT | Mod: GC | Performed by: FAMILY MEDICINE

## 2024-09-16 PROCEDURE — RXMED WILLOW AMBULATORY MEDICATION CHARGE

## 2024-09-16 PROCEDURE — 700105 HCHG RX REV CODE 258

## 2024-09-16 PROCEDURE — 80053 COMPREHEN METABOLIC PANEL: CPT

## 2024-09-16 RX ORDER — HYDROMORPHONE HYDROCHLORIDE 4 MG/1
4 TABLET ORAL EVERY 4 HOURS PRN
Qty: 35 TABLET | Refills: 0 | Status: SHIPPED | OUTPATIENT
Start: 2024-09-16 | End: 2024-09-23

## 2024-09-16 RX ORDER — MEGESTROL ACETATE 40 MG/ML
400 SUSPENSION ORAL DAILY
Qty: 480 ML | Refills: 0 | Status: SHIPPED | OUTPATIENT
Start: 2024-09-17

## 2024-09-16 RX ORDER — MORPHINE SULFATE 15 MG/1
15 TABLET, FILM COATED, EXTENDED RELEASE ORAL EVERY 12 HOURS
Qty: 14 TABLET | Refills: 0 | Status: SHIPPED | OUTPATIENT
Start: 2024-09-16 | End: 2024-09-23

## 2024-09-16 RX ORDER — SODIUM CHLORIDE 9 MG/ML
INJECTION, SOLUTION INTRAVENOUS CONTINUOUS
Status: DISCONTINUED | OUTPATIENT
Start: 2024-09-16 | End: 2024-09-16

## 2024-09-16 RX ADMIN — PANTOPRAZOLE SODIUM 40 MG: 40 INJECTION, POWDER, FOR SOLUTION INTRAVENOUS at 04:19

## 2024-09-16 RX ADMIN — POLYETHYLENE GLYCOL 3350 1 PACKET: 17 POWDER, FOR SOLUTION ORAL at 17:27

## 2024-09-16 RX ADMIN — HEPARIN 500 UNITS: 100 SYRINGE at 21:49

## 2024-09-16 RX ADMIN — HYDROMORPHONE HYDROCHLORIDE 4 MG: 4 TABLET ORAL at 22:00

## 2024-09-16 RX ADMIN — HYDROMORPHONE HYDROCHLORIDE 4 MG: 4 TABLET ORAL at 14:08

## 2024-09-16 RX ADMIN — SENNOSIDES AND DOCUSATE SODIUM 2 TABLET: 50; 8.6 TABLET ORAL at 17:27

## 2024-09-16 RX ADMIN — HYDROMORPHONE HYDROCHLORIDE 4 MG: 4 TABLET ORAL at 04:18

## 2024-09-16 RX ADMIN — HYDROMORPHONE HYDROCHLORIDE 4 MG: 4 TABLET ORAL at 17:28

## 2024-09-16 RX ADMIN — SODIUM CHLORIDE: 9 INJECTION, SOLUTION INTRAVENOUS at 06:14

## 2024-09-16 RX ADMIN — MEGESTROL ACETATE 400 MG: 40 SUSPENSION ORAL at 06:12

## 2024-09-16 RX ADMIN — MAGNESIUM HYDROXIDE 30 ML: 1200 LIQUID ORAL at 04:19

## 2024-09-16 RX ADMIN — HYDROMORPHONE HYDROCHLORIDE 6 MG: 4 TABLET ORAL at 00:58

## 2024-09-16 RX ADMIN — PANCRELIPASE 24000 UNITS: 120000; 24000; 76000 CAPSULE, DELAYED RELEASE PELLETS ORAL at 18:42

## 2024-09-16 RX ADMIN — LACTULOSE 30 ML: 10 SOLUTION ORAL at 17:27

## 2024-09-16 RX ADMIN — PANCRELIPASE 24000 UNITS: 120000; 24000; 76000 CAPSULE, DELAYED RELEASE PELLETS ORAL at 12:27

## 2024-09-16 ASSESSMENT — PAIN DESCRIPTION - PAIN TYPE
TYPE: ACUTE PAIN

## 2024-09-16 ASSESSMENT — ENCOUNTER SYMPTOMS
BLURRED VISION: 0
CHILLS: 0
NAUSEA: 0
WEAKNESS: 1
ABDOMINAL PAIN: 1
CONSTIPATION: 1
DIZZINESS: 0
FEVER: 0
BLOOD IN STOOL: 0
HEARTBURN: 0
DEPRESSION: 0
BACK PAIN: 0
DIARRHEA: 0
COUGH: 0
SHORTNESS OF BREATH: 0
VOMITING: 0

## 2024-09-16 NOTE — CARE PLAN
The patient is Watcher - Medium risk of patient condition declining or worsening    Shift Goals  Clinical Goals: Patient will have bilirubin trend toward normal limits  Patient Goals: Patient will have pain controlled throughout the shift  Family Goals: updates on plan of care and patient comfort    Progress made toward(s) clinical / shift goals:      Problem: Pain - Standard  Goal: Alleviation of pain or a reduction in pain to the patient’s comfort goal  Outcome: Progressing   Patient medicated per MAR for pain. Patient states improvement in pain after pain medication. Heated K pad ordered for additional comfort but patient did not feel it helps.     Problem: Fall Risk  Goal: Patient will remain free from falls  Outcome: Progressing   Patient up to chair throughout the shift. Chair locked. Call light and belongings within reach.  at bedside assisting patient. Patient calling appropriately for assistance.     Patient is not progressing towards the following goals: N/a

## 2024-09-16 NOTE — PROGRESS NOTES
..Gastroenterology Progress Note               Author:  Janina Mosquera, AUGUSTINE,  APRN Date & Time Created: 9/16/2024 8:13 AM       Patient ID:  Name:             Bruce Wilson  YOB: 1960  Age:                 64 y.o.  female  MRN:               [id number]      Medical Decision Making, by Problem:  Active Hospital Problems    Diagnosis     Hyponatremia [E87.1]     Anemia [D64.9]     Abdominal pain [R10.9]     Moderate malnutrition (HCC) [E44.0]     Pancreatic cancer metastasized to liver (HCC) [C25.9, C78.7]      Presenting Chief Complaint:  Jaundice     History of Present Illness:   64-year-old female transferred from [facility] for jaundice.  Patient has a history of pancreatic cancer.  She was in the area visiting with an oncologist who was going to pursue a nontraditional treatment for her pancreatic cancer as best I understand.  She was noticed to be jaundiced.  She went to [facility].  She was transferred here.  Patient has had a metal stent placed in her bile duct.  It is unclear whether this was covered or uncovered.  This was done in [place].  Her  may have records related to that.  Patient denies fevers.  ROS per admitting HPI otherwise.  No changes.     The patient had an EGD done on 8/12/2024.  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    ERCP 10/24/2023 at [facility]:  Impression: - Upper endoscopy revealed a normal esophagus and stomach. A medium to large ulcerated mass was seen in the duodenal bulb with luminal stenosis at the D1/D2   junction. The duodenal stricture was traversed without difficulty (this lesion was previously biopsied at [facility] on 10/20/2023).  - Cholangiogram images revealed a severe stricture of the distal common bile duct in the intrapancreatic portion. The stricture measured approximately 1.5 to 2 cm in length. The common duct and intrahepatic ducts are dilated upstream. The cystic duct is patent.  - A biliary sphincterotomy was  performed.  - Successful placement of a 10 mm x 4 cm fully-covered WallFlex expandable metal biliary stent.     Final path moderately differentiated adenocarcinoma with final cytology from FNA of celiac axis lymph nodes revealed metastatic adenocarcinoma    Nov 13, 2023: Started neoadjuvant FOLFIRINOX.  After 8 doses, based on tumor markers and suspicion of a liver lesion, deemed to have progression.  Switched to gemcitabine and nab-paclitaxel.  Then had chemoradiation.  Evaluation then revealed unresectable tumor. This was with Dr. Ramey in consultation with [facility].     9/11/2024: ERCP  IMPRESSION:  -Antral telangiectasias, diminutive.  Favor radiation effect.  Nonbleeding.  -Ulcer at the duodenal apex with stenosis, dilated with TTS balloon to 13.5 mm.  -Biliary stent removal with replacement (10 mm x 4 cm fully covered self-expanding metallic stent)    Interval History:  9/12/2024: Patient seen in collaboration with the primary team,  also at bedside.  Continues to complain of constipation and fatigue and does not feel ready to go home.  Pain controlled on current regimen.  Discussed ERCP findings and current lab results.  AST/ALT minimally decreased, alk phos and T. Bili uptrending now 10>7    9/13/2024: Patient seen with  and daughter at bedside.  Worked in collaboration with Dr. Chavez, Dr. Solo and Dr. Zamora from primary team to present current images and plan of care.  Dr. Chavez went through detailed description of current pathophysiology and anatomical challenges.     Labs reviewed, hemoglobin 7.8, alk phos and T. bili increased, now 13.7> 10. Last BM yesterday morning    9/14/2024: Patient seen with  bedside in collaboration with Dr. Zamora from primary team.  MRI completed overnight, read pending  T. bili continues to uptrend now 14.4 however alk phos decreasing  No documented BM since 9/12    9/15/2024: Patient seen with her . Had a BM today, still with poor appetite. T.  Bili now 14.7>14.45 with alk phos increase.    9/16/2024: Patient seen this morning in collaboration with primary team.  T. bili 14.4, INR 1.13.  Had a bowel movement, still has minimal appetite.    Hospital Medications:  Current Facility-Administered Medications   Medication Dose Frequency Provider Last Rate Last Admin    NS infusion   Continuous Watson Zamora M.D. 83 mL/hr at 09/16/24 0614 New Bag at 09/16/24 0614    lactulose 20 GM/30ML solution 30 mL  30 mL TID Watson Zamora M.D.        megestrol (Megace) 40 MG/ML suspension 400 mg  400 mg DAILY Watson Zamora M.D.   400 mg at 09/16/24 0612    lidocaine-prilocaine (Emla) 2.5-2.5 % cream   PRN Danial Galeana M.D.   2 mL at 09/15/24 0450    pancrelipase (Lip-Prot-Amyl) (Creon 16557) 82[address]-48287 units capsule 24,000 Units  1 Capsule TID WITH MEALS Bradnie Joseph, PharmD   24,000 Units at 09/15/24 1748    pancrelipase (Lip-Prot-Amyl) (Creon 6000) 6000-90438 units capsule 12,000 Units  2 Capsule With Snacks PRN rBandie Joseph, PharmD        pantoprazole (Protonix) injection 40 mg  40 mg DAILY Ila Pacheco, D.O.   40 mg at 09/16/24 0419    HYDROmorphone (Dilaudid) tablet 4 mg  4 mg Q3HRS PRN Ila Pacheco, D.O.   4 mg at 09/16/24 0418    Or    HYDROmorphone (Dilaudid) tablet 6 mg  6 mg Q3HRS PRN Ila Pacheco, D.O.   6 mg at 09/16/24 0058    Or    HYDROmorphone (Dilaudid) injection 1 mg  1 mg Q3HRS PRN Ila Pacheco, D.O.   1 mg at 09/15/24 0213    senna-docusate (Pericolace Or Senokot S) 8.6-50 MG per tablet 2 Tablet  2 Tablet Q EVENING Ila Pacheco, D.O.   2 Tablet at 09/15/24 1747    And    polyethylene glycol/lytes (Miralax) Packet 1 Packet  1 Packet BID HANS VillatoroOShaista   1 Packet at 09/15/24 1747    magnesium hydroxide (Milk Of Magnesia) suspension 30 mL  30 mL DAILY HANS VillatoroOShaista   30 mL at 09/16/24 0419    Pharmacy Consult Request ...Pain Management Review 1 Each  1 Each PHARMACY TO DOSE Ila  "EMILEE Pacheco D.O.        ondansetron (Zofran) syringe/vial injection 4 mg  4 mg Q3HRS PRN Ila EMILEE Pacheco D.O.   4 mg at 09/13/24 0033    Pharmacy Consult Request ...Pain Management Review 1 Each  1 Each PHARMACY TO DOSE Yovani Carrillo M.D.       Last reviewed on 9/11/2024 11:10 AM by Zachariah Gilmore D.O.       Review of Systems:  Review of Systems   Constitutional:  Positive for malaise/fatigue. Negative for chills and fever.   HENT:  Negative for hearing loss.    Eyes:  Negative for blurred vision.   Respiratory:  Negative for cough and shortness of breath.    Cardiovascular:  Negative for chest pain and leg swelling.   Gastrointestinal:  Positive for abdominal pain and constipation. Negative for blood in stool, diarrhea, heartburn, melena, nausea and vomiting.   Genitourinary:  Negative for dysuria.   Musculoskeletal:  Negative for back pain.   Skin:  Negative for rash.   Neurological:  Positive for weakness. Negative for dizziness.   Psychiatric/Behavioral:  Negative for depression.    All other systems reviewed and are negative.    Vital signs:  Weight/BMI: Body mass index is 21.05 kg/m².  /76   Pulse 64   Temp 36.7 °C (98.1 °F) (Temporal)   Resp 18   Ht 1.575 m (5' 2\")   Wt 52.2 kg (115 lb 1.3 oz)   SpO2 99%   Vitals:    09/15/24 1100 09/15/24 1622 09/15/24 1920 09/16/24 0415   BP:  102/56 115/67 109/76   Pulse:  61 68 64   Resp:  16 17 18   Temp:  36.7 °C (98.1 °F) 36.7 °C (98.1 °F) 36.7 °C (98.1 °F)   TempSrc:  Temporal Temporal Temporal   SpO2:  96% 98% 99%   Weight: 52.2 kg (115 lb 1.3 oz)      Height:         Oxygen Therapy:  Pulse Oximetry: 99 %, O2 (LPM): 0, O2 Delivery Device: None - Room Air    Intake/Output Summary (Last 24 hours) at 9/16/2024 0813  Last data filed at 9/15/2024 1853  Gross per 24 hour   Intake 600 ml   Output 350 ml   Net 250 ml         Physical Exam  Vitals and nursing note reviewed.   Constitutional:       General: She is not in acute distress.     Appearance: " She is ill-appearing.   HENT:      Head: Normocephalic and atraumatic.      Right Ear: External ear normal.      Left Ear: External ear normal.      Nose: Nose normal.      Mouth/Throat:      Mouth: Mucous membranes are moist.      Pharynx: Oropharynx is clear.   Eyes:      General: Scleral icterus present.   Cardiovascular:      Rate and Rhythm: Normal rate and regular rhythm.      Pulses: Normal pulses.      Heart sounds: Normal heart sounds.   Pulmonary:      Effort: Pulmonary effort is normal. No respiratory distress.      Breath sounds: Normal breath sounds.   Abdominal:      General: Abdomen is flat. Bowel sounds are normal. There is no distension.      Palpations: Abdomen is soft.      Tenderness: There is no abdominal tenderness.   Musculoskeletal:         General: Normal range of motion.      Cervical back: Normal range of motion.   Skin:     General: Skin is warm and dry.      Capillary Refill: Capillary refill takes less than 2 seconds.      Coloration: Skin is jaundiced.   Neurological:      Mental Status: She is alert and oriented to person, place, and time.      Motor: Weakness present.   Psychiatric:         Mood and Affect: Mood normal.         Behavior: Behavior normal.       Labs:  Recent Labs     09/14/24  0015 09/15/24  0042 09/16/24  0105   SODIUM 134* 131* 131*   POTASSIUM 3.7 3.7 3.6   CHLORIDE 102 99 97   CO2 21 22 22   BUN 12 10 7*   CREATININE <0.17* 0.42* <0.17*   CALCIUM 8.6 8.2* 8.2*     Recent Labs     09/14/24  0015 09/15/24  0042 09/16/24  0105   ALTSGPT 69* 74* 68*   ASTSGOT 56* 78* 86*   ALKPHOSPHAT 1794* 2021* 1966*   TBILIRUBIN 14.4* 14.7* 14.4*   GLUCOSE 133* 127* 110*     Recent Labs     09/14/24  0015 09/15/24  0042 09/16/24  0105   WBC 7.3 5.5 5.0   NEUTSPOLYS 92.20* 75.90* 70.70   LYMPHOCYTES 2.60* 7.30* 10.60*   MONOCYTES 3.50 14.40* 15.50*   EOSINOPHILS 0.80 1.10 0.80   BASOPHILS 0.00 0.40 0.60   ASTSGOT 56* 78* 86*   ALTSGPT 69* 74* 68*   ALKPHOSPHAT 1794* 2021* 1966*    TBILIRUBIN 14.4* 14.7* 14.4*     Recent Labs     09/14/24  0015 09/15/24  0042 09/16/24  0105   RBC 2.72* 2.70* 2.88*   HEMOGLOBIN 7.8* 7.8* 8.2*   HEMATOCRIT 24.2* 23.7* 24.9*   PLATELETCT 237 229 267   PROTHROMBTM 16.1*  --   --    INR 1.27*  --   --      Recent Results (from the past 24 hour(s))   Comp Metabolic Panel    Collection Time: 09/16/24  1:05 AM   Result Value Ref Range    Sodium 131 (L) 135 - 145 mmol/L    Potassium 3.6 3.6 - 5.5 mmol/L    Chloride 97 96 - 112 mmol/L    Co2 22 20 - 33 mmol/L    Anion Gap 12.0 7.0 - 16.0    Glucose 110 (H) 65 - 99 mg/dL    Bun 7 (L) 8 - 22 mg/dL    Creatinine <0.17 (L) 0.50 - 1.40 mg/dL    Calcium 8.2 (L) 8.5 - 10.5 mg/dL    Correct Calcium 9.2 8.5 - 10.5 mg/dL    AST(SGOT) 86 (H) 12 - 45 U/L    ALT(SGPT) 68 (H) 2 - 50 U/L    Alkaline Phosphatase 1966 (H) 30 - 99 U/L    Total Bilirubin 14.4 (H) 0.1 - 1.5 mg/dL    Albumin 2.7 (L) 3.2 - 4.9 g/dL    Total Protein 5.2 (L) 6.0 - 8.2 g/dL    Globulin 2.5 1.9 - 3.5 g/dL    A-G Ratio 1.1 g/dL   CBC WITH DIFFERENTIAL    Collection Time: 09/16/24  1:05 AM   Result Value Ref Range    WBC 5.0 4.8 - 10.8 K/uL    RBC 2.88 (L) 4.20 - 5.40 M/uL    Hemoglobin 8.2 (L) 12.0 - 16.0 g/dL    Hematocrit 24.9 (L) 37.0 - 47.0 %    MCV 86.5 81.4 - 97.8 fL    MCH 28.5 27.0 - 33.0 pg    MCHC 32.9 32.2 - 35.5 g/dL    RDW 72.2 (H) 35.9 - 50.0 fL    Platelet Count 267 164 - 446 K/uL    MPV 10.5 9.0 - 12.9 fL    Neutrophils-Polys 70.70 44.00 - 72.00 %    Lymphocytes 10.60 (L) 22.00 - 41.00 %    Monocytes 15.50 (H) 0.00 - 13.40 %    Eosinophils 0.80 0.00 - 6.90 %    Basophils 0.60 0.00 - 1.80 %    Immature Granulocytes 1.80 (H) 0.00 - 0.90 %    Nucleated RBC 0.00 0.00 - 0.20 /100 WBC    Neutrophils (Absolute) 3.52 1.82 - 7.42 K/uL    Lymphs (Absolute) 0.53 (L) 1.00 - 4.80 K/uL    Monos (Absolute) 0.77 0.00 - 0.85 K/uL    Eos (Absolute) 0.04 0.00 - 0.51 K/uL    Baso (Absolute) 0.03 0.00 - 0.12 K/uL    Immature Granulocytes (abs) 0.09 0.00 - 0.11 K/uL     NRBC (Absolute) 0.00 K/uL   ESTIMATED GFR    Collection Time: 09/16/24  1:05 AM   Result Value Ref Range    GFR (CKD-EPI) 137 >60 mL/min/1.73 m 2       Radiology Review:  [id number]   Final Result      1.  Pneumobilia. Slight improvement in biliary dilatation compared to prior study.   2.  Air within the CBD stent.   3.  Pancreatic parenchyma is not well evaluated due to CBD stent and lack of IV contrast.   4.  Redemonstration of bilobar hepatic lesions concerning for hepatic metastases.   5.  No gallstones. Small amount of air within the gallbladder.   6.  Small volume ascites.   7.  Small right pleural effusion and associated atelectasis.      [id number] VIEW   Final Result      1. Nonobstructive bowel gas pattern.   2. Interval decrease in amount of stool throughout the colon noted on the prior CT.   3. Biliary stent.      [id number] STENT - TUBE   Final Result      Fluoroscopic guidance provided for ERCP. Total fluoroscopy time used was 1 minute and 5 seconds.      MR-ABDOMEN-WITH & W/O   Final Result      1.  Poor definition of pancreatic head mass.   2.  Artifact from biliary stent present.  Enhancing soft tissue at the proximal aspect concerning for tumor occlusion of the stent.   3.  Mild intrahepatic biliary dilation.   4.  Gas in the gallbladder, likely secondary to stent.   5.  Multiple liver lesions, primarily in the RIGHT lobe, most likely indicating metastasis.   6.  Small volume ascites.            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    After extensive chart review and further discussions with our advanced endoscopist, procedure has been canceled for today  He reviewed images with radiologist and is not sure that placing another stent is going to be of benefit  Understandably, Bruce and her  were disappointed.  Dr. Partida will come and speak to them today and discuss the anatomy and challenges of any endoscopic procedure  Continue twice daily MiraLAX, patient did not want any more Relistor, lactulose works for her so continue dose as needed for constipation   Encourage ambulation as tolerated    Plan discussed with patient and her , primary team, Dr. Chavez, Dr. Partida    ..Janina Mosquera, AUGUSTINE,  APRN    Core Quality Measures   Reviewed items::  Labs, Medications and Radiology reports reviewed

## 2024-09-16 NOTE — CARE PLAN
The patient is Stable - Low risk of patient condition declining or worsening    Shift Goals  Clinical Goals: Biliary stent placement  Patient Goals: pain controlled  Family Goals: updates on plan of care    Progress made toward(s) clinical / shift goals:    Problem: Pain - Standard  Goal: Alleviation of pain or a reduction in pain to the patient’s comfort goal  Outcome: Progressing     Problem: Fall Risk  Goal: Patient will remain free from falls  Outcome: Progressing  Note: Patient is low fall risk and is up self with  in room       Patient is not progressing towards the following goals:

## 2024-09-16 NOTE — PROGRESS NOTES
Education reinforced on importance of heparin locking port when disconnected from IV fluids. Patient refused despite education. Yovani Carrillo notified.

## 2024-09-16 NOTE — PROGRESS NOTES
[facility] MEDICINE PROGRESS NOTE        Attending:   Dr. Suh    Resident:   Watson Zamora M.D.    PATIENT:   Bruce Wilson; [id number]; 1960    ID:   64 y.o. female admitted for biliary stent obstruction in the setting of stage IV pancreatic cancer with mets    SUBJECTIVE:   Patient had well-controlled pain overnight.  Lactulose was effective in giving her bowel movements.  Patient endorsed lower extremity swelling since yesterday.   requested that IV fluids be taken off due to concern for lower extremity edema and fluid overload.      She was n.p.o. over midnight due to thinking that GI would undergo bare-metal stent procedure today.  However after speaking with GI and patient, it was reported that Dr. Partida had consulted a radiologist and discussed the case, there was concerned about the technical ability for the procedure and whether or not it would be beneficial for this patient.  They reported that Dr. Partida will visit with the patient around 4 PM today to go over the techniques of the procedure and come to a shared decision.    The patient and her  expressed frustration at continually being delayed in this matter, they expressed that they were only staying this weekend so that they can get the procedure that they thought was happening today and are upset that now they are hearing that it may not happen at all and if so he may be Wednesday before it happens.  They have been considering leaving so that they can pursue chemotherapy treatment with Dr. Dickerson, they expressed that they may leave today if there is no plan for procedure in the very near future.    We expressed our sympathy for their frustration and agreed that this is less than ideal but also emphasized that the anatomy and pathology in Bruce's abdomen makes any intervention highly complicated and with lower chance of success and therefore a great amount of care and consideration must be taken before undergoing any  procedure.        OBJECTIVE:  Vitals:    09/15/24 1622 09/15/24 1920 09/16/24 0415 09/16/24 0835   BP: 102/56 115/67 109/76 (!) 84/53   Pulse: 61 68 64 64   Resp: 16 17 18 18   Temp: 36.7 °C (98.1 °F) 36.7 °C (98.1 °F) 36.7 °C (98.1 °F) 37.2 °C (98.9 °F)   TempSrc: Temporal Temporal Temporal Temporal   SpO2: 96% 98% 99% 93%   Weight:       Height:         No intake or output data in the 24 hours ending 09/13/24 0511    PHYSICAL EXAM:   General: Jaundiced, cachectic on exam, sitting comfortably in chair  HEENT: Scleral icterus present, extraocular movements intact  Cardiovascular: RRR without murmurs. Normal capillary refill   Respiratory: CTAB  Abdomen: soft, mildly tender, mildly distended, improved from prior, no masses  EXT: No lower extremity edema   Skin: No erythema/lesions   Neuro: Non-focal    LABS:  Recent Labs     09/14/24  0015 09/15/24  0042 09/16/24  0105   WBC 7.3 5.5 5.0   RBC 2.72* 2.70* 2.88*   HEMOGLOBIN 7.8* 7.8* 8.2*   HEMATOCRIT 24.2* 23.7* 24.9*   MCV 89.0 87.8 86.5   MCH 28.7 28.9 28.5   RDW 76.9* 74.6* 72.2*   PLATELETCT 237 229 267   MPV 10.7 10.3 10.5   NEUTSPOLYS 92.20* 75.90* 70.70   LYMPHOCYTES 2.60* 7.30* 10.60*   MONOCYTES 3.50 14.40* 15.50*   EOSINOPHILS 0.80 1.10 0.80   BASOPHILS 0.00 0.40 0.60   RBCMORPHOLO Present  --   --      Recent Labs     09/14/24  0015 09/15/24  0042 09/16/24  0105   SODIUM 134* 131* 131*   POTASSIUM 3.7 3.7 3.6   CHLORIDE 102 99 97   CO2 21 22 22   BUN 12 10 7*   CREATININE <0.17* 0.42* <0.17*   CALCIUM 8.6 8.2* 8.2*   ALBUMIN 2.7* 2.7* 2.7*     Estimated GFR/CRCL = CrCl cannot be calculated (This lab value cannot be used to calculate CrCl because it is not a number: <0.17).  Recent Labs     09/14/24  0015 09/15/24  0042 09/16/24  0105   GLUCOSE 133* 127* 110*     Recent Labs     09/14/24  0015 09/15/24  0042 09/16/24  0105 09/16/24  0906   ASTSGOT 56* 78* 86*  --    ALTSGPT 69* 74* 68*  --    TBILIRUBIN 14.4* 14.7* 14.4*  --    ALKPHOSPHAT 1794* 2021* 1966*   --    GLOBULIN 2.9 2.7 2.5  --    INR 1.27*  --   --  1.13             Recent Labs     09/14/24  0015 09/16/24  0906   INR 1.27* 1.13         IMAGING:  [id number]-M/O   Final Result   Addendum (preliminary) 1 of 1   Case was discussed with Dr. Tyler Partida.      CBD stent displaced just below the expected confluence of the right and    left hepatic ducts.      There is paucity of ducts in the urban hepatis, upstream from the stent,    highly suggestive of a high-grade stricture or an obstructive    mass/metastatic lesion in this region.      Final      1.  Pneumobilia. Slight improvement in biliary dilatation compared to prior study.   2.  Air within the CBD stent.   3.  Pancreatic parenchyma is not well evaluated due to CBD stent and lack of IV contrast.   4.  Redemonstration of bilobar hepatic lesions concerning for hepatic metastases.   5.  No gallstones. Small amount of air within the gallbladder.   6.  Small volume ascites.   7.  Small right pleural effusion and associated atelectasis.      RI-LCAFTDS-4 VIEW   Final Result      1. Nonobstructive bowel gas pattern.   2. Interval decrease in amount of stool throughout the colon noted on the prior CT.   3. Biliary stent.      [id number] STENT - TUBE   Final Result      Fluoroscopic guidance provided for ERCP. Total fluoroscopy time used was 1 minute and 5 seconds.      MR-ABDOMEN-WITH & W/O   Final Result      1.  Poor definition of pancreatic head mass.   2.  Artifact from biliary stent present.  Enhancing soft tissue at the proximal aspect concerning for tumor occlusion of the stent.   3.  Mild intrahepatic biliary dilation.   4.  Gas in the gallbladder, likely secondary to stent.   5.  Multiple liver lesions, primarily in the RIGHT lobe, most likely indicating metastasis.   6.  Small volume ascites.            OUTSIDE IMAGES-CT ABDOMEN /PELVIS   Final Result          MEDS:  Current Facility-Administered Medications   Medication Last Admin    lactulose 20  GM/30ML solution 30 mL      megestrol (Megace) 40 MG/ML suspension 400 mg 400 mg at 09/16/24 0612    lidocaine-prilocaine (Emla) 2.5-2.5 % cream 2 mL at 09/15/24 0450    pancrelipase (Lip-Prot-Amyl) (Creon 51926) 22833-44877 units capsule 24,000 Units 24,000 Units at 09/16/24 1227    pancrelipase (Lip-Prot-Amyl) (Creon 6000) 6000-59123 units capsule 12,000 Units      pantoprazole (Protonix) injection 40 mg 40 mg at 09/16/24 0419    HYDROmorphone (Dilaudid) tablet 4 mg 4 mg at 09/16/24 0418    Or    HYDROmorphone (Dilaudid) tablet 6 mg 6 mg at 09/16/24 0058    Or    HYDROmorphone (Dilaudid) injection 1 mg 1 mg at 09/15/24 0213    senna-docusate (Pericolace Or Senokot S) 8.6-50 MG per tablet 2 Tablet 2 Tablet at 09/15/24 1747    And    polyethylene glycol/lytes (Miralax) Packet 1 Packet 1 Packet at 09/15/24 1747    magnesium hydroxide (Milk Of Magnesia) suspension 30 mL 30 mL at 09/16/24 0419    Pharmacy Consult Request ...Pain Management Review 1 Each      ondansetron (Zofran) syringe/vial injection 4 mg 4 mg at 09/13/24 0033    Pharmacy Consult Request ...Pain Management Review 1 Each         ASSESSMENT/PLAN:    * Abdominal pain  Assessment & Plan  MRCP indicated Artifact from biliary stent present. Enhancing soft tissue at the proximal aspect concerning for tumor occlusion of the stent.  GI recommended patient proceed with ERCP to help resolve this occlusion.  Patient with increasing pain control with 6 mg Dilaudid with some breakthrough pain postoperatively 9/12.  Patient with increasing pain from constipation, concern of ileus. KUB ordered WNL.  Patient treated with lactulose a.m. 9/12 with 2 successful bowel movements.  Likely source of pain is biliary obstruction versus constipation still.  Difficult to interpret as Zofran and hydromorphone which helps some symptoms can also contribute to constipation.  Will attempt to optimize to find middle ground.  Discussion with patient and family about risk of ileus with  lactulose.  They would like a prescription to be used only in emergencies if patient is unable to have bowel movements and is having significant pain.  Patient having continued constipation while inpatient despite addition of lactulose for second dose.  Will add lactulose and titrate to desired frequency/consistency of BM's    Plan:  - Lactulose 30mL up to TID, titrate to 1 BM/day  - Hydromorphone 4 to 6 mg every 3 hours as needed  -Zofran 4 mg every 3 hours as needed for nausea  - cont bowel regimen with 17g Miralax BID, milk of magnesia, senna  -Continue to monitor bilirubin levels  -Consider lactulose prescription on discharge    Hyponatremia  Assessment & Plan  Newly developed since admission.  Unlikely to be hypovolemic as patient has been getting continuous maintenance fluids for a number of days.  Could be due to hypervolemia. Possibly SIADH from opioid use, though patient has been on them for some time.  However, recent increase in dosage so still in consideration.  Patient asymptomatic currently.  Continue to monitor.  - Daily CMP    Anemia  Assessment & Plan  Stable.  Patient with hemoglobin drop from 7.5-6.6 9/11, patient had symptoms of weakness.  Transfused with resolution, 7.8 on 9/13, 7.3 on 9/14.    Plan  -Continue to monitor hemoglobin with a.m. CBC transfuse hemoglobin less than 7    Moderate malnutrition (HCC)  Assessment & Plan  Per ASPEN guidelines.  Patient says that she is not hungry even though food does sound good does not taste good.  - Continue Megace for appetite stimulation    Pancreatic cancer metastasized to liver (HCC)- (present on admission)  Assessment & Plan  Metastatic pancreatic cancer with lesions to the liver, lungs and possible retroperitoneal involvement per CT abdomen on admission.  Patient is s/p biliary stent placement with ongoing chemotherapy treatment.  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  MRCP with  findings of enhancing soft tissue at proximal aspect of biliary stent concerning for tumor occlusion.  Patient underwent ERCP on 9/11.  After speaking with IR and GI on 9/13, concerned that tumor is still obstructing stent as his bilirubin is continuing to increase.  Also possible this could be reactionary in the postop setting.  If obstructed, consideration would have to be made for drain placement however given the location of the tumor on imaging it is likely that multiple drains would have to be placed in order to alleviate obstruction.  Repeat MRCP ordered on 9/13 for up-to-date imaging prior to shared decision making regarding drain placement versus attempted repeat ERCP. bilirubin continues to trend upward.    Plan:  - GI planning to visit with patient at 4 PM and discussed technical aspects of procedure.  Will follow-up with GI and family after this discussion for final planning.  -Add general diet to patient as no procedure planned for today 9/16  - Pain management, cont to titrate to pain control  - Stop IV fluids  - Patient with improved eating after addition of home meds.,  Continue Protonix and Creon.         Core Measures  IV Fluids: None  Antbx: None  DVT ppx:  SCDs  Code status: Full  Dispo: inpatient, pending GI intervention planning    Watson Zamora MD  [facility]  PGY-1

## 2024-09-17 NOTE — PROGRESS NOTES
Introduced myself to patient and patient's .   Reviewed clinical course briefly and their goals  Extensive discussion about the possible technical challenges of repeat ERCP. MRCP and cholangiogram images reviewed in detail. Paucity of ducts in hilum, unclear etiology.. Possible cause of elevated LFT with disease progression, obstruction, small ductal obstruction from malignancy involvement.   Discussed my concern about progression of disease and limitation of stenting not able to reach all the smaller ducts and puts her for risks without gain.  Discussed pros/cons of fully covered metal stent (appropriate for distal CBD/HOP vs uncovered metal stent vs plastic stent; consideration would be trial plastic stent but discussed probability of resolution of LFT likely limited and risks associated with multiple stent, migration, cholangitis, etc. Also would not be able to drain branch ducts. And need to remove stent 2-3 months pending size.   Consider opinion at tertiary hospital such as [facility]  They will discuss with Hospitalist and General GI team.  Deferred management of bilateral lower extremities non-pitting edema to Hospital team.

## 2024-09-17 NOTE — DISCHARGE INSTRUCTIONS
Discharge Instructions    Discharged to home by car with relative. Discharged via wheelchair, hospital escort: Refused.  Special equipment needed: Not Applicable    Be sure to schedule a follow-up appointment with your primary care doctor or any specialists as instructed.     Discharge Plan:   Diet Plan: Discussed  Activity Level: Discussed  Confirmed Follow up Appointment: Patient to Call and Schedule Appointment  Confirmed Symptoms Management: Discussed  Medication Reconciliation Updated: Yes    I understand that a diet low in cholesterol, fat, and sodium is recommended for good health. Unless I have been given specific instructions below for another diet, I accept this instruction as my diet prescription.   Other diet:     Special Instructions: None    -Is this patient being discharged with medication to prevent blood clots?  No    Is patient discharged on Warfarin / Coumadin?   No

## (undated) DEVICE — BALLOON RETRIEVAL EXTRACTOR PRO RX 12-15MM

## (undated) DEVICE — BUTTON ENDOSCOPY DISPOSABLE

## (undated) DEVICE — TUBING CLEARLINK DUO-VENT - C-FLO (48EA/CA)

## (undated) DEVICE — CO2 CANNULA,SSOFT,ADLT,7O2,7CO2,M,BLK: Brand: MEDLINE

## (undated) DEVICE — DEVICE BIOPSY RX BILIARY SYSTEM CAP (10EA/BX)

## (undated) DEVICE — SODIUM CHL IRRIGATION 0.9% 1000ML (12EA/CA)

## (undated) DEVICE — PORT AUXILLARY WATER (50EA/BX)

## (undated) DEVICE — SUCTION INSTRUMENT YANKAUER BULBOUS TIP W/O VENT (50EA/CA)

## (undated) DEVICE — NEEDLE ASPIR 22 GAX0-8 CM 1.65X2.4 MM SHTH EXPECT SLM LN

## (undated) DEVICE — FORCEP GRASPING RESCUE COMBO RAT/ALLIGATOR (5EA/BX)

## (undated) DEVICE — 3M™ STERI-STRIP™ COMPOUND BENZOIN TINCTURE 40 BAGS/CARTON 4 CARTONS/CASE C1544: Brand: 3M™ STERI-STRIP™

## (undated) DEVICE — MAJOR SET UP PK

## (undated) DEVICE — ENDO CARRY-ON PROCEDURE KIT INCLUDES SUCTION TUBING, LUBRICANT, GAUZE, BIOHAZARD STICKER, TRANSPORT PAD AND INTERCEPT BEDSIDE KIT.: Brand: ENDO CARRY-ON PROCEDURE KIT

## (undated) DEVICE — SYRINGE ALLIANCE INFLATION (5EA/BX)

## (undated) DEVICE — SOLUTION IV IRRIG 500ML 0.9% SODIUM CHL 2F7123

## (undated) DEVICE — BALLOON CRE WG 12-15MM 240CM 5.5 F G

## (undated) DEVICE — WATER IRRIGATION STERILE 1000ML (12EA/CA)

## (undated) DEVICE — SENSOR OXIMETER ADULT SPO2 RD SET (20EA/BX)

## (undated) DEVICE — 3M™ TEGADERM™ TRANSPARENT FILM DRESSING FRAME STYLE, 1626W, 4 IN X 4-3/4 IN (10 CM X 12 CM), 50/CT 4CT/CASE: Brand: 3M™ TEGADERM™

## (undated) DEVICE — KIT ANESTHESIA W/CIRCUIT & 3/LT BAG W/FILTER (20EA/CA)

## (undated) DEVICE — CANISTER SUCTION RIGID RED 1500CC (40EA/CA)

## (undated) DEVICE — KIT  I.V. START (100EA/CA)

## (undated) DEVICE — SYRINGE 50 ML LL (40EA/BX 4BX/CA)

## (undated) DEVICE — ELECTRODE PT RET AD L9FT HI MOIST COND ADH HYDRGEL CORDED

## (undated) DEVICE — BLOCK BITE MAXI DENTAL RETENTION RIM (100EA/BX)

## (undated) DEVICE — FILM CASSETTE ENDO

## (undated) DEVICE — MASK OXYGEN VNYL ADLT MED CONC WITH 7 FOOT TUBING - (50EA/CA)

## (undated) DEVICE — COVER ENDOSCOPE DISTAL SINGLE USE (20EA/BX)

## (undated) DEVICE — KIT CUSTOM PROCEDURE SINGLE FOR ENDO (15/CA)

## (undated) DEVICE — LACTATED RINGERS INJ 1000 ML - (14EA/CA 60CA/PF)

## (undated) DEVICE — ELECTRODE 850 FOAM ADHESIVE - HYDROGEL RADIOTRNSPRNT (50/PK)

## (undated) DEVICE — GLOVE ORANGE PI 7 1/2   MSG9075

## (undated) DEVICE — TOWEL STOP TIMEOUT SAFETY FLAG (40EA/CA)

## (undated) DEVICE — APPLICATOR MEDICATED 26 CC SOLUTION HI LT ORNG CHLORAPREP

## (undated) DEVICE — SUTURE VCRL SZ 3-0 L18IN ABSRB UD L26MM SH 1/2 CIR J864D

## (undated) DEVICE — GOWN SIRUS NONREIN LG W/TWL: Brand: MEDLINE INDUSTRIES, INC.

## (undated) DEVICE — NEPTUNE 4 PORT MANIFOLD - (20/PK)

## (undated) DEVICE — SET LEADWIRE 5 LEAD BEDSIDE DISPOSABLE ECG (1SET OF 5/EA)

## (undated) DEVICE — CANISTER SUCTION 3000ML MECHANICAL FILTER AUTO SHUTOFF MEDI-VAC NONSTERILE LF DISP (40EA/CA)

## (undated) DEVICE — TUBE CONNECTING SUCTION - CLEAR PLASTIC STERILE 72 IN (50EA/CA)

## (undated) DEVICE — DRAPE C ARM UNIV W41XL74IN CLR PLAS XR VELC CLSR POLY STRP

## (undated) DEVICE — SUTURE VCRL SZ 4-0 L18IN ABSRB UD L19MM PS-2 3/8 CIR PRIM J496H

## (undated) DEVICE — Z INACTIVE USE 2855096 SPONGE GZ W4XL4IN 8 PLY 100% COT

## (undated) DEVICE — ELECTRODE ECG MONITR FOAM TEAR DROP ADLT RED

## (undated) DEVICE — 3M™ STERI-STRIP™ REINFORCED ADHESIVE SKIN CLOSURES, R1540, 1/8 IN X 3 IN (3 MM X 75 MM), 5 STRIPS/ENVELOPE: Brand: 3M™ STERI-STRIP™

## (undated) DEVICE — CONMED SCOPE SAVER BITE BLOCK, 20X27 MM: Brand: SCOPE SAVER

## (undated) DEVICE — TUBE E-T HI-LO CUFF 7.0MM (10EA/PK)

## (undated) DEVICE — CANNULATING SPHINCTEROTOME: Brand: JAGTOME™ REVOLUTION RX

## (undated) DEVICE — SINGLE-USE BIOPSY FORCEPS: Brand: RADIAL JAW 4

## (undated) DEVICE — SYRINGE MED 10ML LUERLOCK TIP W/O SFTY DISP

## (undated) DEVICE — PACK,UNIVERSAL,SPLIT,II,AURORA: Brand: MEDLINE

## (undated) DEVICE — SYRINGE 10 ML CONTROL LL (25EA/BX 4BX/CA)

## (undated) DEVICE — GUIDEWIRE .025X260CM JAGWIRE REVOLUTION (2EA/BX)